# Patient Record
Sex: FEMALE | Race: WHITE | Employment: OTHER | URBAN - METROPOLITAN AREA
[De-identification: names, ages, dates, MRNs, and addresses within clinical notes are randomized per-mention and may not be internally consistent; named-entity substitution may affect disease eponyms.]

---

## 2018-02-20 ENCOUNTER — OP HISTORICAL/CONVERTED ENCOUNTER (OUTPATIENT)
Dept: OTHER | Age: 73
End: 2018-02-20

## 2018-09-19 ENCOUNTER — OP HISTORICAL/CONVERTED ENCOUNTER (OUTPATIENT)
Dept: OTHER | Age: 73
End: 2018-09-19

## 2020-09-22 LAB — AMB DEXA, EXTERNAL: NORMAL

## 2021-12-08 ENCOUNTER — HOSPITAL ENCOUNTER (OUTPATIENT)
Age: 76
Setting detail: OBSERVATION
Discharge: HOME | End: 2021-12-09
Attending: STUDENT IN AN ORGANIZED HEALTH CARE EDUCATION/TRAINING PROGRAM | Admitting: INTERNAL MEDICINE
Payer: MEDICARE

## 2021-12-08 ENCOUNTER — APPOINTMENT (OUTPATIENT)
Dept: CT IMAGING | Age: 76
End: 2021-12-08
Attending: STUDENT IN AN ORGANIZED HEALTH CARE EDUCATION/TRAINING PROGRAM
Payer: MEDICARE

## 2021-12-08 DIAGNOSIS — R47.81 SLURRED SPEECH: Primary | ICD-10-CM

## 2021-12-08 PROBLEM — G45.9 TIA (TRANSIENT ISCHEMIC ATTACK): Status: ACTIVE | Noted: 2021-12-08

## 2021-12-08 LAB
ABO + RH BLD: NORMAL
ALBUMIN SERPL-MCNC: 3.8 G/DL (ref 3.5–5)
ALBUMIN/GLOB SERPL: 1.2 {RATIO} (ref 1.1–2.2)
ALP SERPL-CCNC: 79 U/L (ref 45–117)
ALT SERPL-CCNC: 20 U/L (ref 12–78)
ANION GAP SERPL CALC-SCNC: 8 MMOL/L (ref 5–15)
APTT PPP: 23.6 SEC (ref 21.2–34.1)
AST SERPL W P-5'-P-CCNC: 12 U/L (ref 15–37)
BASOPHILS # BLD: 0 K/UL (ref 0–0.1)
BASOPHILS NFR BLD: 0 % (ref 0–1)
BILIRUB SERPL-MCNC: 0.2 MG/DL (ref 0.2–1)
BLOOD GROUP ANTIBODIES SERPL: NEGATIVE
BUN SERPL-MCNC: 28 MG/DL (ref 6–20)
BUN/CREAT SERPL: 26 (ref 12–20)
CA-I BLD-MCNC: 9.4 MG/DL (ref 8.5–10.1)
CHLORIDE SERPL-SCNC: 105 MMOL/L (ref 97–108)
CO2 SERPL-SCNC: 25 MMOL/L (ref 21–32)
CREAT SERPL-MCNC: 1.06 MG/DL (ref 0.55–1.02)
DIFFERENTIAL METHOD BLD: ABNORMAL
EOSINOPHIL # BLD: 0.2 K/UL (ref 0–0.4)
EOSINOPHIL NFR BLD: 2 % (ref 0–7)
ERYTHROCYTE [DISTWIDTH] IN BLOOD BY AUTOMATED COUNT: 13.1 % (ref 11.5–14.5)
GLOBULIN SER CALC-MCNC: 3.2 G/DL (ref 2–4)
GLUCOSE SERPL-MCNC: 317 MG/DL (ref 65–100)
HCT VFR BLD AUTO: 37.3 % (ref 35–47)
HGB BLD-MCNC: 12.4 G/DL (ref 11.5–16)
IMM GRANULOCYTES # BLD AUTO: 0 K/UL (ref 0–0.04)
IMM GRANULOCYTES NFR BLD AUTO: 1 % (ref 0–0.5)
INR PPP: 0.9 (ref 0.9–1.1)
LACTATE SERPL-SCNC: 1.6 MMOL/L (ref 0.4–2)
LYMPHOCYTES # BLD: 2.4 K/UL (ref 0.8–3.5)
LYMPHOCYTES NFR BLD: 30 % (ref 12–49)
MCH RBC QN AUTO: 33.2 PG (ref 26–34)
MCHC RBC AUTO-ENTMCNC: 33.2 G/DL (ref 30–36.5)
MCV RBC AUTO: 99.7 FL (ref 80–99)
MONOCYTES # BLD: 0.7 K/UL (ref 0–1)
MONOCYTES NFR BLD: 9 % (ref 5–13)
NEUTS SEG # BLD: 4.6 K/UL (ref 1.8–8)
NEUTS SEG NFR BLD: 58 % (ref 32–75)
NRBC # BLD: 0 K/UL (ref 0–0.01)
NRBC BLD-RTO: 0 PER 100 WBC
PLATELET # BLD AUTO: 370 K/UL (ref 150–400)
PMV BLD AUTO: 10.3 FL (ref 8.9–12.9)
POTASSIUM SERPL-SCNC: 4 MMOL/L (ref 3.5–5.1)
PROT SERPL-MCNC: 7 G/DL (ref 6.4–8.2)
PROTHROMBIN TIME: 12.3 SEC (ref 11.9–14.7)
RBC # BLD AUTO: 3.74 M/UL (ref 3.8–5.2)
SODIUM SERPL-SCNC: 138 MMOL/L (ref 136–145)
SPECIMEN EXP DATE BLD: NORMAL
THERAPEUTIC RANGE,PTTT: NORMAL SEC (ref 82–109)
TROPONIN-HIGH SENSITIVITY: 6 NG/L (ref 0–51)
WBC # BLD AUTO: 7.9 K/UL (ref 3.6–11)

## 2021-12-08 PROCEDURE — 96374 THER/PROPH/DIAG INJ IV PUSH: CPT

## 2021-12-08 PROCEDURE — 70450 CT HEAD/BRAIN W/O DYE: CPT

## 2021-12-08 PROCEDURE — 85610 PROTHROMBIN TIME: CPT

## 2021-12-08 PROCEDURE — 85025 COMPLETE CBC W/AUTO DIFF WBC: CPT

## 2021-12-08 PROCEDURE — 74011250636 HC RX REV CODE- 250/636

## 2021-12-08 PROCEDURE — 80053 COMPREHEN METABOLIC PANEL: CPT

## 2021-12-08 PROCEDURE — 74011000636 HC RX REV CODE- 636: Performed by: STUDENT IN AN ORGANIZED HEALTH CARE EDUCATION/TRAINING PROGRAM

## 2021-12-08 PROCEDURE — 99285 EMERGENCY DEPT VISIT HI MDM: CPT

## 2021-12-08 PROCEDURE — 84484 ASSAY OF TROPONIN QUANT: CPT

## 2021-12-08 PROCEDURE — 85730 THROMBOPLASTIN TIME PARTIAL: CPT

## 2021-12-08 PROCEDURE — 86901 BLOOD TYPING SEROLOGIC RH(D): CPT

## 2021-12-08 PROCEDURE — 70498 CT ANGIOGRAPHY NECK: CPT

## 2021-12-08 PROCEDURE — 36415 COLL VENOUS BLD VENIPUNCTURE: CPT

## 2021-12-08 PROCEDURE — G0378 HOSPITAL OBSERVATION PER HR: HCPCS

## 2021-12-08 PROCEDURE — 83605 ASSAY OF LACTIC ACID: CPT

## 2021-12-08 RX ORDER — ONDANSETRON 2 MG/ML
INJECTION INTRAMUSCULAR; INTRAVENOUS
Status: COMPLETED
Start: 2021-12-08 | End: 2021-12-08

## 2021-12-08 RX ORDER — ONDANSETRON 2 MG/ML
4 INJECTION INTRAMUSCULAR; INTRAVENOUS
Status: COMPLETED | OUTPATIENT
Start: 2021-12-08 | End: 2021-12-08

## 2021-12-08 RX ADMIN — IOPAMIDOL 100 ML: 755 INJECTION, SOLUTION INTRAVENOUS at 20:20

## 2021-12-08 RX ADMIN — ONDANSETRON 4 MG: 2 INJECTION INTRAMUSCULAR; INTRAVENOUS at 19:38

## 2021-12-09 ENCOUNTER — APPOINTMENT (OUTPATIENT)
Dept: NON INVASIVE DIAGNOSTICS | Age: 76
End: 2021-12-09
Attending: INTERNAL MEDICINE
Payer: MEDICARE

## 2021-12-09 ENCOUNTER — APPOINTMENT (OUTPATIENT)
Dept: MRI IMAGING | Age: 76
End: 2021-12-09
Attending: INTERNAL MEDICINE
Payer: MEDICARE

## 2021-12-09 VITALS
RESPIRATION RATE: 20 BRPM | SYSTOLIC BLOOD PRESSURE: 161 MMHG | TEMPERATURE: 97.8 F | OXYGEN SATURATION: 99 % | HEIGHT: 63 IN | HEART RATE: 91 BPM | BODY MASS INDEX: 30.12 KG/M2 | DIASTOLIC BLOOD PRESSURE: 67 MMHG | WEIGHT: 170 LBS

## 2021-12-09 LAB
ECHO AO ROOT DIAM: 3 CM
ECHO AV PEAK GRADIENT: 9 MMHG
ECHO AV PEAK VELOCITY: 152 CM/S
ECHO EST RA PRESSURE: 3 MMHG
ECHO LA AREA 4C: 11.01 CM2
ECHO LA MAJOR AXIS: 3.4 CM
ECHO LA MINOR AXIS: 1.89 CM
ECHO LV E' SEPTAL VELOCITY: 6.14 CM/S
ECHO LV EDV A2C: 96.1 CM3
ECHO LV EJECTION FRACTION BIPLANE: 78.8 % (ref 55–100)
ECHO LV ESV A2C: 14 CM3
ECHO LV INTERNAL DIMENSION DIASTOLIC: 4.58 CM (ref 3.9–5.3)
ECHO LV INTERNAL DIMENSION SYSTOLIC: 2.41 CM
ECHO LV IVSD: 0.98 CM (ref 0.6–0.9)
ECHO LV MASS 2D: 161 G (ref 67–162)
ECHO LV MASS INDEX 2D: 89.4 G/M2 (ref 43–95)
ECHO LV POSTERIOR WALL DIASTOLIC: 1.05 CM (ref 0.6–0.9)
ECHO LVOT PEAK GRADIENT: 4 MMHG
ECHO LVOT PEAK VELOCITY: 104 CM/S
ECHO MV A VELOCITY: 138 CM/S
ECHO MV AREA PHT: 4.23 CM2
ECHO MV E DECELERATION TIME (DT): 169 MS
ECHO MV E VELOCITY: 80.2 CM/S
ECHO MV E/A RATIO: 0.58
ECHO MV E/E' SEPTAL: 13.06
ECHO MV PRESSURE HALF TIME (PHT): 52 MS
ECHO PV MAX VELOCITY: 104 CM/S
ECHO PV PEAK INSTANTANEOUS GRADIENT SYSTOLIC: 4 MMHG
ECHO PVEIN A DURATION: 137 MS
ECHO PVEIN A VELOCITY: 33.6 CM/S
ECHO RA AREA 4C: 10.89 CM2
ECHO RIGHT VENTRICULAR SYSTOLIC PRESSURE (RVSP): 25 MMHG
ECHO RV INTERNAL DIMENSION: 2.8 CM
ECHO RV TAPSE: 1.9 CM (ref 1.5–2)
ECHO TV REGURGITANT PEAK GRADIENT: 22 MMHG
ECHO TV REGURGITANT VTI: 234 CM
GLUCOSE BLD STRIP.AUTO-MCNC: 247 MG/DL (ref 65–117)
GLUCOSE BLD STRIP.AUTO-MCNC: 333 MG/DL (ref 65–117)
GLUCOSE BLD STRIP.AUTO-MCNC: 344 MG/DL (ref 65–117)
GLUCOSE BLD STRIP.AUTO-MCNC: 353 MG/DL (ref 65–117)
MV DEC SLOPE: 5220 MM/S2
MV DEC SLOPE: 5220 MM/S2
PERFORMED BY, TECHID: ABNORMAL

## 2021-12-09 PROCEDURE — 74011250636 HC RX REV CODE- 250/636: Performed by: INTERNAL MEDICINE

## 2021-12-09 PROCEDURE — G0378 HOSPITAL OBSERVATION PER HR: HCPCS

## 2021-12-09 PROCEDURE — 97165 OT EVAL LOW COMPLEX 30 MIN: CPT

## 2021-12-09 PROCEDURE — 82962 GLUCOSE BLOOD TEST: CPT

## 2021-12-09 PROCEDURE — 74011250637 HC RX REV CODE- 250/637: Performed by: INTERNAL MEDICINE

## 2021-12-09 PROCEDURE — 92610 EVALUATE SWALLOWING FUNCTION: CPT

## 2021-12-09 PROCEDURE — 93306 TTE W/DOPPLER COMPLETE: CPT

## 2021-12-09 PROCEDURE — 96372 THER/PROPH/DIAG INJ SC/IM: CPT

## 2021-12-09 PROCEDURE — 74011636637 HC RX REV CODE- 636/637: Performed by: INTERNAL MEDICINE

## 2021-12-09 PROCEDURE — 70551 MRI BRAIN STEM W/O DYE: CPT

## 2021-12-09 RX ORDER — ENOXAPARIN SODIUM 100 MG/ML
40 INJECTION SUBCUTANEOUS EVERY 24 HOURS
Status: DISCONTINUED | OUTPATIENT
Start: 2021-12-09 | End: 2021-12-09 | Stop reason: HOSPADM

## 2021-12-09 RX ORDER — ATENOLOL AND CHLORTHALIDONE TABLET 50; 25 MG/1; MG/1
1 TABLET ORAL DAILY
COMMUNITY

## 2021-12-09 RX ORDER — CLOPIDOGREL BISULFATE 75 MG/1
75 TABLET ORAL DAILY
Status: DISCONTINUED | OUTPATIENT
Start: 2021-12-09 | End: 2021-12-09

## 2021-12-09 RX ORDER — INSULIN GLARGINE 100 [IU]/ML
20 INJECTION, SOLUTION SUBCUTANEOUS
COMMUNITY
End: 2022-04-28 | Stop reason: SDUPTHER

## 2021-12-09 RX ORDER — SODIUM CHLORIDE 9 MG/ML
75 INJECTION, SOLUTION INTRAVENOUS CONTINUOUS
Status: DISCONTINUED | OUTPATIENT
Start: 2021-12-09 | End: 2021-12-09 | Stop reason: HOSPADM

## 2021-12-09 RX ORDER — MAGNESIUM SULFATE 100 %
4 CRYSTALS MISCELLANEOUS AS NEEDED
Status: DISCONTINUED | OUTPATIENT
Start: 2021-12-09 | End: 2021-12-09 | Stop reason: HOSPADM

## 2021-12-09 RX ORDER — INSULIN LISPRO 100 [IU]/ML
7 INJECTION, SOLUTION INTRAVENOUS; SUBCUTANEOUS ONCE
Status: COMPLETED | OUTPATIENT
Start: 2021-12-09 | End: 2021-12-09

## 2021-12-09 RX ORDER — INSULIN GLARGINE 100 [IU]/ML
10 INJECTION, SOLUTION SUBCUTANEOUS
Status: DISCONTINUED | OUTPATIENT
Start: 2021-12-09 | End: 2021-12-09 | Stop reason: HOSPADM

## 2021-12-09 RX ORDER — SIMVASTATIN 20 MG/1
TABLET, FILM COATED ORAL
COMMUNITY
End: 2021-12-09

## 2021-12-09 RX ORDER — GUAIFENESIN 100 MG/5ML
81 LIQUID (ML) ORAL DAILY
Status: DISCONTINUED | OUTPATIENT
Start: 2021-12-10 | End: 2021-12-09 | Stop reason: HOSPADM

## 2021-12-09 RX ORDER — GUAIFENESIN 100 MG/5ML
81 LIQUID (ML) ORAL DAILY
Qty: 30 TABLET | Refills: 0 | Status: SHIPPED
Start: 2021-12-10

## 2021-12-09 RX ORDER — ACETAMINOPHEN 650 MG/1
650 SUPPOSITORY RECTAL
Status: DISCONTINUED | OUTPATIENT
Start: 2021-12-09 | End: 2021-12-09 | Stop reason: HOSPADM

## 2021-12-09 RX ORDER — DEXTROSE 50 % IN WATER (D50W) INTRAVENOUS SYRINGE
25-50 AS NEEDED
Status: DISCONTINUED | OUTPATIENT
Start: 2021-12-09 | End: 2021-12-09 | Stop reason: HOSPADM

## 2021-12-09 RX ORDER — SITAGLIPTIN AND METFORMIN HYDROCHLORIDE 50; 1000 MG/1; MG/1
1 TABLET, FILM COATED ORAL
COMMUNITY
End: 2022-04-28 | Stop reason: SDUPTHER

## 2021-12-09 RX ORDER — AMLODIPINE BESYLATE AND BENAZEPRIL HYDROCHLORIDE 10; 40 MG/1; MG/1
1 CAPSULE ORAL DAILY
COMMUNITY

## 2021-12-09 RX ORDER — ACETAMINOPHEN 325 MG/1
650 TABLET ORAL
Status: DISCONTINUED | OUTPATIENT
Start: 2021-12-09 | End: 2021-12-09 | Stop reason: HOSPADM

## 2021-12-09 RX ORDER — INSULIN LISPRO 100 [IU]/ML
INJECTION, SOLUTION INTRAVENOUS; SUBCUTANEOUS
Status: DISCONTINUED | OUTPATIENT
Start: 2021-12-09 | End: 2021-12-09 | Stop reason: HOSPADM

## 2021-12-09 RX ORDER — ATORVASTATIN CALCIUM 40 MG/1
40 TABLET, FILM COATED ORAL DAILY
Status: DISCONTINUED | OUTPATIENT
Start: 2021-12-10 | End: 2021-12-09 | Stop reason: HOSPADM

## 2021-12-09 RX ORDER — ATORVASTATIN CALCIUM 40 MG/1
40 TABLET, FILM COATED ORAL DAILY
Qty: 30 TABLET | Refills: 0 | Status: SHIPPED | OUTPATIENT
Start: 2021-12-10 | End: 2022-07-28 | Stop reason: SDUPTHER

## 2021-12-09 RX ORDER — ASPIRIN 325 MG
325 TABLET ORAL DAILY
Status: DISCONTINUED | OUTPATIENT
Start: 2021-12-09 | End: 2021-12-09

## 2021-12-09 RX ORDER — ERGOCALCIFEROL 1.25 MG/1
50000 CAPSULE ORAL
COMMUNITY

## 2021-12-09 RX ADMIN — SODIUM CHLORIDE 75 ML/HR: 9 INJECTION, SOLUTION INTRAVENOUS at 03:52

## 2021-12-09 RX ADMIN — ENOXAPARIN SODIUM 40 MG: 100 INJECTION SUBCUTANEOUS at 01:39

## 2021-12-09 RX ADMIN — INSULIN GLARGINE 10 UNITS: 100 INJECTION, SOLUTION SUBCUTANEOUS at 03:51

## 2021-12-09 RX ADMIN — CLOPIDOGREL BISULFATE 75 MG: 75 TABLET ORAL at 10:55

## 2021-12-09 RX ADMIN — INSULIN LISPRO 3 UNITS: 100 INJECTION, SOLUTION INTRAVENOUS; SUBCUTANEOUS at 10:55

## 2021-12-09 RX ADMIN — INSULIN LISPRO 7 UNITS: 100 INJECTION, SOLUTION INTRAVENOUS; SUBCUTANEOUS at 03:51

## 2021-12-09 RX ADMIN — ASPIRIN 325 MG ORAL TABLET 325 MG: 325 PILL ORAL at 10:55

## 2021-12-09 RX ADMIN — INSULIN LISPRO 7 UNITS: 100 INJECTION, SOLUTION INTRAVENOUS; SUBCUTANEOUS at 11:30

## 2021-12-09 NOTE — PROGRESS NOTES
Reason for Admission:  TIA                     RUR Score: N/A                    Plan for utilizing home health:  None        PCP: First and Last name:  Jaylan Herbert MD     Name of Practice:    Are you a current patient: Yes/No: Yes   Approximate date of last visit: two weeks   Can you participate in a virtual visit with your PCP: Yes                    Current Advanced Directive/Advance Care Plan: Full Code      Healthcare Decision Maker:   Click here to complete [address] including selection of the Healthcare Decision Maker Relationship (ie \"Primary\")           Self                  Transition of Care Plan:                    CM discussed discharge planning with patient. She will return home at discharge. Family will transport her. 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 Her sisters also assist as needed. Patient lives in a single story home with three steps to enter. Patient complete own ADL/IADL care. She is able to self medicate and drive to appt. Observation notice provided in writing to patient as well as verbal explanation of the policy. Copy placed on chart.

## 2021-12-09 NOTE — CONSULTS
NEUROLOGY CONSULT    Name Cassandra Calvillo Age 78 y.o. MRN [id number]  1945     Referring Physician: Valdemar Dinh MD      Chief Complaint: TIA versus stroke     This is a 78 y.o. right handed  female who was going to the Religious with her mother yesterday afternoon when suddenly her left hand went out of control and the only way she could control it was by holding with the other hand. She noticed slight poor control in the left foot also in change in the speech. She started throwing up. She denied any change in the vision or any focal weakness or numbness or difficulty walking. She waited for some time to see if it will go away but it continues to she decided to come back home. She called her sister and they decided to come to the ER. The symptoms lasted for about 2 to 3 hours and then resolved. The vomiting is stopped. She has been feeling fine since. A CTA of the head and the neck done in the ED was essentially unremarkable for any significant flow-limiting stenotic disease in the head and the neck. An MRI done this morning revealed a lacunar infarct in the left cerebellar hemisphere. Assessment and Plan:  1. Left cerebellar acute lacunar infarct: Patient symptoms have resolved and she feels back to her normal self except the speech is still slightly slow and scanning. The patient needs echocardiogram and if normal she can be discharged on aspirin 81 mg once a day and follow-up in the clinic if needed. Since the CTA of the neck was unremarkable, I am discontinuing the carotid Doppler. 2.  Hypertension. 3.  Diabetes mellitus. 4.  Exogenous obesity.     Thank you for the consult    No Known Allergies     Current Facility-Administered Medications   Medication Dose Route Frequency    acetaminophen (TYLENOL) tablet 650 mg  650 mg Oral Q4H PRN    Or    acetaminophen (TYLENOL) solution 650 mg  650 mg Per NG tube Q4H PRN    Or    acetaminophen (TYLENOL) suppository 650 mg 650 mg Rectal Q4H PRN    0.9% sodium chloride infusion  75 mL/hr IntraVENous CONTINUOUS    enoxaparin (LOVENOX) injection 40 mg  40 mg SubCUTAneous Q24H    insulin lispro (HUMALOG) injection   SubCUTAneous AC&HS    glucose chewable tablet 16 g  4 Tablet Oral PRN    dextrose (D50W) injection syrg 12.5-25 g  25-50 mL IntraVENous PRN    glucagon (GLUCAGEN) injection 1 mg  1 mg IntraMUSCular PRN    insulin glargine (LANTUS) injection 10 Units  10 Units SubCUTAneous QHS    [START ON 12/10/2021] atorvastatin (LIPITOR) tablet 40 mg  40 mg Oral DAILY    [START ON 12/10/2021] aspirin chewable tablet 81 mg  81 mg Oral DAILY     History reviewed. No pertinent past medical history. Social History     Tobacco Use    Smoking status: Never Smoker    Smokeless tobacco: Never Used   Substance Use Topics    Alcohol use: Not on file    Drug use: Never     Exam  Visit Vitals  /64 (BP 1 Location: Left upper arm, BP Patient Position: At rest)   Pulse 81   Temp 97.6 °F (36.4 °C)   Resp 18   Ht 5' 3\" (1.6 m)   Wt 77.1 kg (170 lb)   SpO2 96%   BMI 30.11 kg/m²     General: Well developed, well nourished. Patient in no distress   Head: Normocephalic, atraumatic, anicteric sclera   Neck Normal ROM, No thyromegally   Lungs:  Clear to auscultation    Cardiac: Regular rate and rhythm with no murmurs. Abd: Bowel sounds were audible   Ext: No pedal edema   Skin: Supple no rash     NeurologicExam:  Mental Status: Alert and oriented to person place and time   Speech: Mild scanning, no aphasia slight dysarthria. Cranial Nerves:   Pupils are equal round and reactive to light and accommodation, extraocular movements are intact and full, visual fields are intact by confrontation, no nystagmus noted, face is symmetric, sensation in face is intact and symmetric, hearing is intact and symmetric, tongue and uvula are in midline with normal movements, palate is elevating equally, shoulder shrug is intact and symmetric.    Motor: Full and symmetric strength of upper and lower ext . Normal bulk and tone. Reflexes:   Deep tendon reflexes 2+/4 in the upper extremities and 0/4 in the lower and symmetric. Sensory:   Symmetric and intact    Gait:  Gait is deferred   Tremor:   No tremor noted. Cerebellar:  Coordination intact for finger-nose-finger. Neurovascular: No carotid bruits.  No JVD      Lab Review  Lab Results   Component Value Date/Time    WBC 7.9 12/08/2021 07:45 PM    HCT 37.3 12/08/2021 07:45 PM    HGB 12.4 12/08/2021 07:45 PM    PLATELET 370 44/87/9015 07:45 PM     Lab Results   Component Value Date/Time    Sodium 138 12/08/2021 07:45 PM    Potassium 4.0 12/08/2021 07:45 PM    Chloride 105 12/08/2021 07:45 PM    CO2 25 12/08/2021 07:45 PM    Glucose 317 (H) 12/08/2021 07:45 PM    BUN 28 (H) 12/08/2021 07:45 PM    Creatinine 1.06 (H) 12/08/2021 07:45 PM    Calcium 9.4 12/08/2021 07:45 PM     No results found for: B12LT, FOL, RBCF  No results found for: LDL, LDLC, DLDLP  No results found for: HBA1C, FLP2HOCW, [id number]  No components found for: TROPQUANT  No results found for: DONNA

## 2021-12-09 NOTE — PROGRESS NOTES
Discussed discharge instructions with the patient including medications and follow up appointments. Patient verbalized understanding. IV removed. Primary nurse made aware of patient's blood pressure. Patient not taken down for discharge at this time.

## 2021-12-09 NOTE — PROGRESS NOTES
SPEECH LANGUAGE PATHOLOGY BEDSIDE SWALLOW EVALUATIONS  Patient: Teri Moses  (78 y.o. )  Date: 12/9/2021  Primary Diagnosis: CVA   Precautions:      ASSESSMENT :  Patient is alert, oriented x4, and follows basic commands. Slight R facial droop w/ minimal dysarthria. Speech is 100% intelligible w/ reduced rate and imprecise articulation. Patient reports tongue feels \"heavy\". No language deficits noted informally. Patient present w/ wfl oropharyngeal swallow fxn. Oral phase c/b adequate mastication and A-P transit. Pharyngeal phase c/b timely swallow initiation and HLE is wfl upon palpation. No overt s/s of pen/asp observed. PLAN :  Recommendations and Planned Interventions:  Rec regular/thin diet w/ general asp/GERD precautions. No ST needs for dysphagia. Follow-up for dysarthria eval.      SUBJECTIVE:   Patient denies dysphagia and GERD. Reports sx's of facial droop, dysarthria and involuntary L sided movement. She states involuntary movement resolved but speech remains slurred. Patient reports difficulty managing blood sugars. OBJECTIVE:     Patient admitted w/ facial droop, dysarthria, L sided weakness and involuntary movement. PMH for CVA approx 15 years ago, HTN and DMII. CT CODE NEURO HEAD WO CONTRAST   Final Result   1. No acute intracranial abnormality. 2.  Chronic bilateral basal ganglia infarcts. Report called to Dr. Medardo Diamond by Dr. Dennie Members at 8:33 PM on 12/8/2021.       CTA CODE NEURO HEAD AND NECK W CONT    (Results Pending)   MRI BRAIN WO CONT    (Results Pending)         Diet prior to admission: Regular  Current Diet:  DIET ADULT     Cognitive and Communication Status:  Neurologic State: Alert  Orientation Level: Oriented X4  Cognition: Appropriate decision making, Follows commands  Perception: Appears intact  Perseveration: No perseveration noted  Safety/Judgement: Awareness of environment  Swallowing Evaluation:   Oral Assessment:  Oral Assessment  Labial: Right droop  Dentition: Upper dentures; Other (comment) (lower implants)  Oral Hygiene: wfl  Lingual: No impairment  Velum: No impairment  Mandible: No impairment  P.O. Trials:  Patient Position: upright in bed  Vocal quality prior to P.O.: No impairment  Consistency Presented: Puree; Solid; Thin liquid  How Presented: Self-fed/presented; SLP-fed/presented; Cup/sip; Straw; Successive swallows     Bolus Acceptance: No impairment  Bolus Formation/Control: No impairment     Propulsion: No impairment  Oral Residue: None  Initiation of Swallow: No impairment  Laryngeal Elevation: Functional  Aspiration Signs/Symptoms: None  Pharyngeal Phase Characteristics: No impairment, issues, or problems              Oral Phase Severity: No impairment  Pharyngeal Phase Severity : No impairment  Voice:  Vocal Quality: No impairment    Pain:  Pain Scale 1: Numeric (0 - 10)  Pain Intensity 1: 0         After treatment:   Patient left in no apparent distress in bed, Call bell within reach, Nursing notified and Caregiver / family present    COMMUNICATION/EDUCATION:   Patient was educated regarding diet recs, swallow strategies, speech strategies, BEFAST, risks of unmanaged blood sugar for stroke, and aspiration precautions. She demonstrated Good understanding as evidenced by engagement and recall. The patient's plan of care including recommendations, planned interventions, and recommended diet changes were discussed with: Occupational therapist and Registered nurse.        Thank you for this referral.  Shara Bear M.S., M.Ed., CCC-SLP  Time Calculation: 21 mins

## 2021-12-09 NOTE — ED TRIAGE NOTES
Sister called ems stating pt having slurred speech while talking to her, and acting strange. Left arm, leg and head involentarily movement. Equal  strong. Accu 319mg/dl. Vomitted once. Just started taking insulin. Hx of veritgo.  Last known well around 6:15-6:30pm.

## 2021-12-09 NOTE — DISCHARGE SUMMARY
Physician Discharge Summary     Patient ID:    Norma Roa  [id number]  86 y.o.  1945    Admit date: 12/8/2021    Discharge date : 12/9/2021    Chronic Diagnoses:    Problem List as of 12/9/2021 Never Reviewed          Codes Class Noted - Resolved    TIA (transient ischemic attack) ICD-10-CM: G45.9  ICD-9-CM: 435.9  12/8/2021 - Present        Slurred speech ICD-10-CM: R47.81  ICD-9-CM: 784.59  12/8/2021 - Present          22    Final Diagnoses:   Acute left cerebellar infarct     History of bilateral basal ganglia infarcts     Hypertension     Diabetes mellitus type 2    Reason for Hospitalization:  She is a 70-year-old female with history of CVA without deficit, hypertension and diabetes who presented the ED last night with slurred speech and transient left upper extremity involuntary movement and weakness which started around 6:30 PM.  No symptoms resolved in the ED. Hospital Course:   Patient was admitted overnight as observation. She was started on aspirin and high intensity statin therapy. Her symptoms all largely resolved with exception of some slow speech    MRI of the brain was obtained which showed an acute ischemic stroke left cerebellar region    CTA of the head and neck was unremarkable for large vessel occlusion    Patient was evaluated by neurology    We are currently waiting for her final echo report              Discharge Medications:   Current Discharge Medication List      START taking these medications    Details   atorvastatin (LIPITOR) 40 mg tablet Take 1 Tablet by mouth daily. Qty: 30 Tablet, Refills: 0  Start date: 12/10/2021      aspirin 81 mg chewable tablet Take 1 Tablet by mouth daily. Qty: 30 Tablet, Refills: 0  Start date: 12/10/2021         CONTINUE these medications which have NOT CHANGED    Details   amLODIPine-benazepril (LOTREL) 10-40 mg per capsule Take 1 Capsule by mouth daily.       atenoloL-chlorthalidone (TENORETIC) 50-25 mg per tablet Take 1 Tablet by mouth daily. ergocalciferol (Vitamin D2) 1,250 mcg (50,000 unit) capsule Take 50,000 Units by mouth every seven (7) days. SITagliptin-metFORMIN (Janumet) 50-1,000 mg per tablet Take 1 Tablet by mouth daily (with breakfast). insulin glargine (Lantus Solostar U-100 Insulin) 100 unit/mL (3 mL) inpn 20 Units by SubCUTAneous route nightly. STOP taking these medications       simvastatin (ZOCOR) 20 mg tablet Comments:   Reason for Stopping: Follow up Care:    1. Butch Cobos MD in 1-2 weeks. Please call to set up an appointment shortly after discharge. Diet:  Diabetic Diet    Disposition:  Home. Advanced Directive:   FULL    DNR      Discharge Exam:  General:  Alert, cooperative, no distress, appears stated age. Lungs:   Clear to auscultation bilaterally. Chest wall:  No tenderness or deformity. Heart:  Regular rate and rhythm, S1, S2 normal, no murmur, click, rub or gallop. Abdomen:   Soft, non-tender. Bowel sounds normal. No masses,  No organomegaly. Extremities: Extremities normal, atraumatic, no cyanosis or edema. Pulses: 2+ and symmetric all extremities. Skin: Skin color, texture, turgor normal. No rashes or lesions   Neurologic: CNII-XII intact. No gross sensory or motor deficits        CONSULTATIONS: Neurology    Significant Diagnostic Studies:   12/8/2021: BUN 28 mg/dL (H; Ref range: 6 - 20 mg/dL); Calcium 9.4 mg/dL (Ref range: 8.5 - 10.1 mg/dL); CO2 25 mmol/L (Ref range: 21 - 32 mmol/L); Creatinine 1.06 mg/dL (H; Ref range: 0.55 - 1.02 mg/dL); Glucose 317 mg/dL (H; Ref range: 65 - 100 mg/dL); HCT 37.3 % (Ref range: 35.0 - 47.0 %); HGB 12.4 g/dL (Ref range: 11.5 - 16.0 g/dL); Potassium 4.0 mmol/L (Ref range: 3.5 - 5.1 mmol/L);  Sodium 138 mmol/L (Ref range: 136 - 145 mmol/L)  Recent Labs     12/08/21 1945   WBC 7.9   HGB 12.4   HCT 37.3        Recent Labs     12/08/21 1945      K 4.0      CO2 25   BUN 28* CREA 1.06*   *   CA 9.4     Recent Labs     12/08/21 1945   ALT 20   AP 79   TBILI 0.2   TP 7.0   ALB 3.8   GLOB 3.2     Recent Labs     12/08/21 1945   INR 0.9   PTP 12.3   APTT 23.6      No results for input(s): FE, TIBC, PSAT, FERR in the last 72 hours. No results for input(s): PH, PCO2, PO2 in the last 72 hours. No results for input(s): CPK, CKMB in the last 72 hours.     No lab exists for component: TROPONINI  Lab Results   Component Value Date/Time    Glucose (POC) 333 (H) 12/09/2021 11:27 AM    Glucose (POC) 247 (H) 12/09/2021 08:12 AM    Glucose (POC) 344 (H) 12/09/2021 03:46 AM    Glucose (POC) 353 (H) 12/09/2021 02:40 AM       Discharge time spent 35 minutes    Signed:  Watson Jamil MD  12/9/2021  3:24 PM

## 2021-12-09 NOTE — ROUTINE PROCESS
[facility] TRANSFER - OUT REPORT:    Verbal report given to ilya(name) on [place]  being transferred to (unit) for routine progression of care       Report consisted of patients Situation, Background, Assessment and   Recommendations(SBAR). Information from the following report(s) SBAR, ED Summary and MAR was reviewed with the receiving nurse. Lines:   Peripheral IV 12/08/21 Anterior; Left; Proximal Forearm (Active)        Opportunity for questions and clarification was provided.       Patient transported with:   [place]

## 2021-12-09 NOTE — ROUTINE PROCESS
Admission skin assessment done with Tg Patel the patient has redness sacral area ,redness left breast fold.

## 2021-12-09 NOTE — PROGRESS NOTES
PT eval order received and acknowledged. Pt screened and is currently presenting at baseline for functional mobility/transfers. Pt observed amb in room without AD, slightly decreased stance time on left which pt states is her baseline since suffering ankle fracture ~10 years ago. Denies change in sensation, balance, coordination, and strength. PT evaluation order will be discontinued at this time as pt has no acute PT needs. Please reorder PT if pt functional status changes. Thank you.

## 2021-12-09 NOTE — ED PROVIDER NOTES
EMERGENCY DEPARTMENT HISTORY AND PHYSICAL EXAM      Date: 12/8/2021  Patient Name: Jorgito Simmons      History of Presenting Illness     Chief Complaint   Patient presents with    Extremity Weakness    Dizziness       History Provided By: Patient    HPI: Jorgito Simmons, 78 y.o. female with MH of CVA presents to the ED with slurred speech that was noted to be approximately started at [phone]. This was noted by her sister. Also patient has some complaints that her left arm is moving involuntarily. Symptoms of moderate severity, no aggravating relieving factors no associate additional symptoms. EMS did a fingerstick which was 337. There are no other complaints, changes, or physical findings at this time. PCP: Jess Carbajal MD    Current Facility-Administered Medications   Medication Dose Route Frequency Provider Last Rate Last Admin    ondansetron (ZOFRAN) 4 mg/2 mL injection                Past History     Past Medical History:  Diabetes mellitus, hypertension, hyperlipidemia, CVA    Past Surgical History:  History reviewed. No pertinent surgical history. Family History:  History reviewed. No pertinent family history. Social History:  Social History     Tobacco Use    Smoking status: Never Smoker    Smokeless tobacco: Never Used   Substance Use Topics    Alcohol use: Not on file    Drug use: Never       Allergies:  No Known Allergies      Review of Systems     Review of Systems   Constitutional: Negative for activity change and fever. HENT: Negative for dental problem, ear pain, sinus pressure and sinus pain. Eyes: Negative for photophobia, pain and visual disturbance. Respiratory: Negative for cough and shortness of breath. Cardiovascular: Negative for chest pain and leg swelling. Gastrointestinal: Negative for nausea and vomiting. Endocrine: Negative for polydipsia and polyuria.    Musculoskeletal: Negative for arthralgias, gait problem, neck pain and neck stiffness. Neurological: Positive for speech difficulty. Negative for dizziness, seizures and weakness. Psychiatric/Behavioral: Negative for confusion and hallucinations. Physical Exam     Physical Exam  Vitals and nursing note reviewed. Constitutional:       General: She is not in acute distress. Appearance: Normal appearance. She is not ill-appearing, toxic-appearing or diaphoretic. HENT:      Head: Normocephalic and atraumatic. Mouth/Throat:      Mouth: Mucous membranes are moist.      Pharynx: Oropharynx is clear. Eyes:      Extraocular Movements: Extraocular movements intact. Conjunctiva/sclera: Conjunctivae normal.   Cardiovascular:      Rate and Rhythm: Normal rate and regular rhythm. Pulmonary:      Effort: Pulmonary effort is normal.      Breath sounds: Normal breath sounds. Abdominal:      Palpations: Abdomen is soft. Tenderness: There is no abdominal tenderness. Skin:     General: Skin is warm and dry. Neurological:      Mental Status: She is alert and oriented to person, place, and time. Cranial Nerves: No cranial nerve deficit. Sensory: No sensory deficit. Motor: No weakness. Lab and Diagnostic Study Results     Labs -     Recent Results (from the past 12 hour(s))   CBC WITH AUTOMATED DIFF    Collection Time: 12/08/21  7:45 PM   Result Value Ref Range    WBC 7.9 3.6 - 11.0 K/uL    RBC 3.74 (L) 3.80 - 5.20 M/uL    HGB 12.4 11.5 - 16.0 g/dL    HCT 37.3 35.0 - 47.0 %    MCV 99.7 (H) 80.0 - 99.0 FL    MCH 33.2 26.0 - 34.0 PG    MCHC 33.2 30.0 - 36.5 g/dL    RDW 13.1 11.5 - 14.5 %    PLATELET 675 683 - 308 K/uL    MPV 10.3 8.9 - 12.9 FL    NRBC 0.0 0.0  WBC    ABSOLUTE NRBC 0.00 0.00 - 0.01 K/uL    NEUTROPHILS 58 32 - 75 %    LYMPHOCYTES 30 12 - 49 %    MONOCYTES 9 5 - 13 %    EOSINOPHILS 2 0 - 7 %    BASOPHILS 0 0 - 1 %    IMMATURE GRANULOCYTES 1 (H) 0 - 0.5 %    ABS. NEUTROPHILS 4.6 1.8 - 8.0 K/UL    ABS.  LYMPHOCYTES 2.4 0.8 - 3.5 K/UL    ABS. MONOCYTES 0.7 0.0 - 1.0 K/UL    ABS. EOSINOPHILS 0.2 0.0 - 0.4 K/UL    ABS. BASOPHILS 0.0 0.0 - 0.1 K/UL    ABS. IMM. GRANS. 0.0 0.00 - 0.04 K/UL    DF AUTOMATED     METABOLIC PANEL, COMPREHENSIVE    Collection Time: 12/08/21  7:45 PM   Result Value Ref Range    Sodium 138 136 - 145 mmol/L    Potassium 4.0 3.5 - 5.1 mmol/L    Chloride 105 97 - 108 mmol/L    CO2 25 21 - 32 mmol/L    Anion gap 8 5 - 15 mmol/L    Glucose 317 (H) 65 - 100 mg/dL    BUN 28 (H) 6 - 20 mg/dL    Creatinine 1.06 (H) 0.55 - 1.02 mg/dL    BUN/Creatinine ratio 26 (H) 12 - 20      GFR est AA >60 >60 ml/min/1.73m2    GFR est non-AA 51 (L) >60 ml/min/1.73m2    Calcium 9.4 8.5 - 10.1 mg/dL    Bilirubin, total 0.2 0.2 - 1.0 mg/dL    AST (SGOT) 12 (L) 15 - 37 U/L    ALT (SGPT) 20 12 - 78 U/L    Alk. phosphatase 79 45 - 117 U/L    Protein, total 7.0 6.4 - 8.2 g/dL    Albumin 3.8 3.5 - 5.0 g/dL    Globulin 3.2 2.0 - 4.0 g/dL    A-G Ratio 1.2 1.1 - 2.2     PROTHROMBIN TIME + INR    Collection Time: 12/08/21  7:45 PM   Result Value Ref Range    Prothrombin time 12.3 11.9 - 14.7 sec    INR 0.9 0.9 - 1.1     PTT    Collection Time: 12/08/21  7:45 PM   Result Value Ref Range    aPTT 23.6 21.2 - 34.1 sec    aPTT, therapeutic range   82 - 109 sec   TROPONIN-HIGH SENSITIVITY    Collection Time: 12/08/21  7:45 PM   Result Value Ref Range    Troponin-High Sensitivity 6 0 - 51 ng/L   LACTIC ACID    Collection Time: 12/08/21  7:47 PM   Result Value Ref Range    Lactic acid 1.6 0.4 - 2.0 mmol/L       Radiologic Studies -   [unfilled]  CT Results  (Last 48 hours)               12/08/21 1950  CT CODE NEURO HEAD WO CONTRAST Final result    Impression:  1. No acute intracranial abnormality. 2.  Chronic bilateral basal ganglia infarcts. Report called to Dr. Stephanie Busch by Dr. Rema Hinojosa at 8:33 PM on 12/8/2021. Narrative:  Study: Head CT without contrast.       Clinical Indication: Left-sided weakness. Comparison: None available. Technique: Routine volume acquisition of the head was performed without contrast   using soft tissue and bone kernels. Coronal and sagittal reconstructions were   generated and reviewed. Dose reduction: All CT scans at this facility are   performed using dose reduction optimization techniques as appropriate to a   performed exam including the following-automated exposure control, adjustments   of mA and/or Kv according to patient size, or use of iterative reconstructive   technique. Findings:       Chronic infarcts of the bilateral basal ganglia with commensurate prominence of   the left lateral ventricle. No midline shift. The basal cisterns are patent. No acute hemorrhage, abnormal   extra-axial fluid, or evidence of acute large territorial ischemia. Intracranial   vascular calcifications. Unremarkable orbits. The included paranasal sinuses and mastoid air cells are   clear. Fluid in the right petrous apex. No evidence of an aggressive calvarial   or extracalvarial lesion. CXR Results  (Last 48 hours)    None          Medical Decision Making and ED Course   - I am the first and primary provider for this patient AND AM THE PRIMARY PROVIDER OF RECORD. - I reviewed the vital signs, available nursing notes, past medical history, past surgical history, family history and social history. - Initial assessment performed. The patients presenting problems have been discussed, and the staff are in agreement with the care plan formulated and outlined with them. I have encouraged them to ask questions as they arise throughout their visit. Vital Signs-Reviewed the patient's vital signs. Patient Vitals for the past 24 hrs:   Temp Pulse Resp BP SpO2   12/08/21 2021 98 °F (36.7 °C) -- -- -- --   12/08/21 2019 -- 93 16 (!) 149/58 98 %       Records Reviewed: Nursing Notes    Provider Notes (Medical Decision Making):     Patient presented to the ED with concerns for stroke.   Noted by family that the patient has slurred speech and concern for some involuntary movement of the left arm. Stroke was activated. Imaging were reviewed without acute finding. CTA without LVO. Neurology was consulted and they recommended admission to telemetry and getting MRI in the morning. Disposition     Disposition: Condition stable  Admitted to Observation Unit the case was discussed with the admitting physician Dr. Kashmir Gan    Admitted      Diagnosis     Clinical Impression:   1. Slurred speech        Attestations: Antionette aLi MD    Please note that this dictation was completed with [employer], the computer voice recognition software. Quite often unanticipated grammatical, syntax, homophones, and other interpretive errors are inadvertently transcribed by the computer software. Please disregard these errors. Please excuse any errors that have escaped final proofreading. Thank you.

## 2021-12-09 NOTE — H&P
GENERAL GENERIC H&P/CONSULT    Subjective:  79-year-old female with remote history of CVA without residual deficit, hypertension, DM type II. Patient presents to the emergency department complaining of slurred speech and right upper extremity involuntary movement, numbness and weakness since around 6:30 PM.  Sisters at the bedside, she reports left facial droop. Denies headache or visual changes. No loss of consciousness or seizure. Denies shortness of breath, cough or chest pain. Noncontrast CT head shows remote stroke without acute intracranial process. CTA is ordered and pending. Patient is referred for admission for further stroke work-up. History reviewed. No pertinent past medical history. History reviewed. No pertinent surgical history. Prior to Admission medications    Not on File   Home medication includes amlodipine, insulin and oral hypoglycemic agent for diabetes. Patient does not have an exact list with her. No Known Allergies   Social History     Tobacco Use    Smoking status: Never Smoker    Smokeless tobacco: Never Used   Substance Use Topics    Alcohol use: Not on file      History reviewed. No pertinent family history. Review of Systems   Constitutional: Negative for appetite change, chills, diaphoresis and fever. HENT: Negative. Eyes: Negative for pain and visual disturbance. Respiratory: Negative. Cardiovascular: Negative. Gastrointestinal: Negative. Endocrine: Negative. Genitourinary: Negative. Musculoskeletal: Negative. Skin: Negative. Neurological: Positive for facial asymmetry, speech difficulty and weakness. Negative for syncope. Psychiatric/Behavioral: Negative. Objective:    No intake/output data recorded. No intake/output data recorded.   Patient Vitals for the past 8 hrs:   BP Temp Pulse Resp SpO2 Height Weight   12/08/21 2311 (!) 142/72 -- 89 15 97 % -- --   12/08/21 2021 -- 98 °F (36.7 °C) -- -- -- -- --   12/08/21 2019 (!) 149/58 -- 93 16 98 % -- --   12/08/21 1940 -- -- -- -- -- 5' 3\" (1.6 m) 77.1 kg (170 lb)     Physical Exam  Constitutional:       General: She is not in acute distress. Appearance: Normal appearance. HENT:      Head: Normocephalic and atraumatic. Mouth/Throat:      Mouth: Mucous membranes are moist.      Pharynx: Oropharynx is clear. Eyes:      Extraocular Movements: Extraocular movements intact. Conjunctiva/sclera: Conjunctivae normal.      Pupils: Pupils are equal, round, and reactive to light. Cardiovascular:      Rate and Rhythm: Normal rate. Pulses: Normal pulses. Heart sounds: Normal heart sounds. No murmur heard. No friction rub. No gallop. Pulmonary:      Effort: Pulmonary effort is normal.      Breath sounds: Normal breath sounds. Abdominal:      General: Bowel sounds are normal.      Palpations: Abdomen is soft. Musculoskeletal:         General: Normal range of motion. Cervical back: Normal range of motion and neck supple. Skin:     General: Skin is warm and dry. Neurological:      Mental Status: She is alert and oriented to person, place, and time. Mental status is at baseline. Motor: No weakness. Comments: Mild facial deviation to the left  Slurred speech  Power is 5 out of 5 in all extremities   No sensory deficit   Psychiatric:         Mood and Affect: Mood normal.         Thought Content:  Thought content normal.          Labs:    Recent Results (from the past 24 hour(s))   CBC WITH AUTOMATED DIFF    Collection Time: 12/08/21  7:45 PM   Result Value Ref Range    WBC 7.9 3.6 - 11.0 K/uL    RBC 3.74 (L) 3.80 - 5.20 M/uL    HGB 12.4 11.5 - 16.0 g/dL    HCT 37.3 35.0 - 47.0 %    MCV 99.7 (H) 80.0 - 99.0 FL    MCH 33.2 26.0 - 34.0 PG    MCHC 33.2 30.0 - 36.5 g/dL    RDW 13.1 11.5 - 14.5 %    PLATELET 928 934 - 012 K/uL    MPV 10.3 8.9 - 12.9 FL    NRBC 0.0 0.0  WBC    ABSOLUTE NRBC 0.00 0.00 - 0.01 K/uL    NEUTROPHILS 58 32 - 75 % LYMPHOCYTES 30 12 - 49 %    MONOCYTES 9 5 - 13 %    EOSINOPHILS 2 0 - 7 %    BASOPHILS 0 0 - 1 %    IMMATURE GRANULOCYTES 1 (H) 0 - 0.5 %    ABS. NEUTROPHILS 4.6 1.8 - 8.0 K/UL    ABS. LYMPHOCYTES 2.4 0.8 - 3.5 K/UL    ABS. MONOCYTES 0.7 0.0 - 1.0 K/UL    ABS. EOSINOPHILS 0.2 0.0 - 0.4 K/UL    ABS. BASOPHILS 0.0 0.0 - 0.1 K/UL    ABS. IMM. GRANS. 0.0 0.00 - 0.04 K/UL    DF AUTOMATED     METABOLIC PANEL, COMPREHENSIVE    Collection Time: 12/08/21  7:45 PM   Result Value Ref Range    Sodium 138 136 - 145 mmol/L    Potassium 4.0 3.5 - 5.1 mmol/L    Chloride 105 97 - 108 mmol/L    CO2 25 21 - 32 mmol/L    Anion gap 8 5 - 15 mmol/L    Glucose 317 (H) 65 - 100 mg/dL    BUN 28 (H) 6 - 20 mg/dL    Creatinine 1.06 (H) 0.55 - 1.02 mg/dL    BUN/Creatinine ratio 26 (H) 12 - 20      GFR est AA >60 >60 ml/min/1.73m2    GFR est non-AA 51 (L) >60 ml/min/1.73m2    Calcium 9.4 8.5 - 10.1 mg/dL    Bilirubin, total 0.2 0.2 - 1.0 mg/dL    AST (SGOT) 12 (L) 15 - 37 U/L    ALT (SGPT) 20 12 - 78 U/L    Alk.  phosphatase 79 45 - 117 U/L    Protein, total 7.0 6.4 - 8.2 g/dL    Albumin 3.8 3.5 - 5.0 g/dL    Globulin 3.2 2.0 - 4.0 g/dL    A-G Ratio 1.2 1.1 - 2.2     PROTHROMBIN TIME + INR    Collection Time: 12/08/21  7:45 PM   Result Value Ref Range    Prothrombin time 12.3 11.9 - 14.7 sec    INR 0.9 0.9 - 1.1     PTT    Collection Time: 12/08/21  7:45 PM   Result Value Ref Range    aPTT 23.6 21.2 - 34.1 sec    aPTT, therapeutic range   82 - 109 sec   TROPONIN-HIGH SENSITIVITY    Collection Time: 12/08/21  7:45 PM   Result Value Ref Range    Troponin-High Sensitivity 6 0 - 51 ng/L   LACTIC ACID    Collection Time: 12/08/21  7:47 PM   Result Value Ref Range    Lactic acid 1.6 0.4 - 2.0 mmol/L   TYPE & SCREEN    Collection Time: 12/08/21  7:47 PM   Result Value Ref Range    Crossmatch Expiration 12/11/2021,2359     ABO/Rh(D) A Positive     Antibody screen Negative        Assessment:  Active Problems:    TIA (transient ischemic attack) (12/8/2021)      Slurred speech (12/8/2021)    Slurred speech/facial droop/left upper extremity weakness  -TIA versus CVA  -Noncontrast CT head without acute intracranial process, chronic bilateral basal ganglia infarct seen.   CTA head is pending  -Obtain MRI brain  -Neurochecks  -Initiate aspirin, Plavix and statin    DM type II  -Fingerstick monitoring, corrective insulin  -She is on oral hypoglycemic agent and insulin at home, details unknown    Hypertension  -Allow permissive hypertension  -Initiate home antihypertensives once patient is able to provide list    DVT prophylaxis: Lovenox    Full code      Signed:  Chalino Carmichael MD 12/8/2021

## 2021-12-09 NOTE — PROGRESS NOTES
OCCUPATIONAL THERAPY EVALUATION/DISCHARGE  Patient: Janet Hancock (73 y.o. female)  Date: 12/9/2021  Primary Diagnosis: Slurred speech [R47.81]  TIA (transient ischemic attack) [G45.9]        Precautions:        ASSESSMENT  Patient is 79 y/o female came to [facility] with with left facial droop, RUE involuntary movement with numbness and weakness since around 6:30pm and placed under observation 12/8/2021 for r/o CVA (head CT showing no acute intracranial abnormalities, chronic ton basal ganglia infarcts). Pt has hx of CVA without residual deficits, HTN, DM type II. Pt received semi supine in bed A&Ox4 and agreeable for OT eval/tx. Per pt report, pt lives with mother (is primary caretaker of mother who is 81 y/o, sisters assist as needed and visit often) in one story home with ramp to enter and pt is independent for self care and functional transfers/mobility. Pt currently presents with intact ton UE strength, intact ton UE coordiantion, intact ton UE sensation and close to/at baseline independent functional status for self care (independent LB dressing, independent grooming standing sink, independent toileting/cloth mgmt) and functional transfers/mobility (independent sit<->stand and toilet transfer with gait belt). Pt with no acute OT needs at this time thus will D/C pt from skilled OT services after eval. Recommend discharge to home independently when medically appropriate.        Current Level of Function (ADLs/self-care): independent for self care and transfers    Other factors to consider for discharge: at baseline functional status, good family support     PLAN :  Recommend with staff: allow toilet transfers, assist when having multiple lines    Recommendation for discharge: (in order for the patient to meet his/her long term goals)  Home independently     This discharge recommendation:  Has been made in collaboration with the attending provider and/or case management    IF patient discharges home will need the following DME: none       SUBJECTIVE:   Patient stated my sisters help as needed.     OBJECTIVE DATA SUMMARY:   HISTORY:   History reviewed. No pertinent past medical history. History reviewed. No pertinent surgical history. Prior Level of Function/Environment/Context: Pt independent for ADLS/IADLS, independent with mobility prior to admission. Expanded or extensive additional review of patient history:   Home Situation  Home Environment: Private residence  # Steps to Enter: 0  Wheelchair Ramp: Yes  One/Two Story Residence: One story  Living Alone: No  Support Systems: Parent(s) (pt is primary caretaker of mother)  Patient Expects to be Discharged to[de-identified] Unknown  Current DME Used/Available at Home: Glucometer    EXAMINATION OF PERFORMANCE DEFICITS:  Cognitive/Behavioral Status:  Neurologic State: Alert  Orientation Level: Oriented X4  Cognition: Follows commands     Hearing: Auditory  Auditory Impairment: Hard of hearing, right side    Range of Motion:  AROM: Within functional limits  PROM: Within functional limits     Strength:  Strength: Within functional limits     Coordination:  Coordination: Within functional limits  Fine Motor Skills-Upper: Left Intact; Right Intact    Gross Motor Skills-Upper: Left Intact; Right Intact    Tone & Sensation:  Tone: Normal  Sensation: Intact     Balance:  Sitting: Intact; Without support  Standing: Intact;  Without support    Functional Mobility and Transfers for ADLs:  Bed Mobility:  Scooting: Independent    Transfers:  Sit to Stand: Independent  Stand to Sit: Independent  Bed to Chair: Independent  Bathroom Mobility: Independent  Toilet Transfer : Independent  Assistive Device : Gait Belt    ADL Assessment:     Oral Facial Hygiene/Grooming: Independent    Lower Body Dressing: Independent    Toileting: Independent (simulated)     ADL Intervention and task modifications:     Grooming  Grooming Assistance: Independent  Position Performed: Standing  Washing Face: Independent  Washing Hands: Independent  Brushing Teeth: Independent  Brushing/Combing Hair: Independent    Lower Body Dressing Assistance  Socks: Independent  Leg Crossed Method Used: Yes  Position Performed: Seated edge of bed    Toileting  Toileting Assistance: Independent  Clothing Management: [address] AM-PACTM \"6 Clicks\"                                                       Daily Activity Inpatient Short Form  How much help from another person does the patient currently need. .. Total; A Lot A Little None   1. Putting on and taking off regular lower body clothing? []  1 []  2 []  3 [x]  4   2. Bathing (including washing, rinsing, drying)? []  1 []  2 []  3 [x]  4   3. Toileting, which includes using toilet, bedpan or urinal? [] 1 []  2 []  3 [x]  4   4. Putting on and taking off regular upper body clothing? []  1 []  2 []  3 [x]  4   5. Taking care of personal grooming such as brushing teeth? []  1 []  2 []  3 [x]  4   6. Eating meals? []  1 []  2 []  3 [x]  4   © 2007, [address] [address], under license to [employer]. All rights reserved     Score: 24/24     Interpretation of Tool:  Represents clinically-significant functional categories (i.e. Activities of daily living).   Percentage of Impairment CH    0%   CI    1-19% CJ    20-39% CK    40-59% CL    60-79% CM    80-99% CN     100%   [facility]  Score 6-24 24 23 20-22 15-19 10-14 7-9 6         Occupational Therapy Evaluation Charge Determination   History Examination Decision-Making   LOW Complexity : Brief history review  LOW Complexity : 1-3 performance deficits relating to physical, cognitive , or psychosocial skils that result in activity limitations and / or participation restrictions  LOW Complexity : No comorbidities that affect functional and no verbal or physical assistance needed to complete eval tasks       Based on the above components, the patient evaluation is determined to be of the following complexity level: LOW   Pain Rating:  No pain reported    Activity Tolerance:   Good  Please refer to the flowsheet for vital signs taken during this treatment. After treatment patient left in no apparent distress:    Sitting in chair, Call bell within reach, and Caregiver / family present    COMMUNICATION/EDUCATION:   The patients plan of care was discussed with: Physical therapist and Registered nurse.        Thank you for this referral.  Mykel Leiv  Time Calculation: 17 mins

## 2021-12-09 NOTE — PROGRESS NOTES
Hospitalist Progress Note               Daily Progress Note: 12/9/2021      Subjective: The patient is seen for follow up. She is a 82-year-old female with history of CVA without deficit, hypertension and diabetes who presented the ED last night with slurred speech and transient left upper extremity involuntary movement and weakness which started around 6:30 PM.  No symptoms resolved in the ED. This morning she is doing well although complains of slow speech    She was started on aspirin and Plavix by the admitting physician. MRI done this morning shows an acute left cerebellar infarct    Problem List:  Problem List as of 12/9/2021 Never Reviewed          Codes Class Noted - Resolved    TIA (transient ischemic attack) ICD-10-CM: G45.9  ICD-9-CM: 435.9  12/8/2021 - Present        Slurred speech ICD-10-CM: R47.81  ICD-9-CM: 784.59  12/8/2021 - Present              Medications reviewed  Current Facility-Administered Medications   Medication Dose Route Frequency    acetaminophen (TYLENOL) tablet 650 mg  650 mg Oral Q4H PRN    Or    acetaminophen (TYLENOL) solution 650 mg  650 mg Per NG tube Q4H PRN    Or    acetaminophen (TYLENOL) suppository 650 mg  650 mg Rectal Q4H PRN    0.9% sodium chloride infusion  75 mL/hr IntraVENous CONTINUOUS    clopidogreL (PLAVIX) tablet 75 mg  75 mg Oral DAILY    aspirin tablet 325 mg  325 mg Oral DAILY    enoxaparin (LOVENOX) injection 40 mg  40 mg SubCUTAneous Q24H    insulin lispro (HUMALOG) injection   SubCUTAneous AC&HS    glucose chewable tablet 16 g  4 Tablet Oral PRN    dextrose (D50W) injection syrg 12.5-25 g  25-50 mL IntraVENous PRN    glucagon (GLUCAGEN) injection 1 mg  1 mg IntraMUSCular PRN    insulin glargine (LANTUS) injection 10 Units  10 Units SubCUTAneous QHS       Review of Systems:   A comprehensive review of systems was negative except for that written in the HPI.     Objective:   Physical Exam:     Visit Vitals  /64 (BP 1 Location: Left upper arm, BP Patient Position: At rest)   Pulse 81   Temp 97.6 °F (36.4 °C)   Resp 18   Ht 5' 3\" (1.6 m)   Wt 77.1 kg (170 lb)   SpO2 96%   BMI 30.11 kg/m²      O2 Device: None (Room air)    Temp (24hrs), Av °F (36.7 °C), Min:97.6 °F (36.4 °C), Max:98.3 °F (36.8 °C)    No intake/output data recorded. No intake/output data recorded. General:   Awake and alert   Lungs:   Clear to auscultation bilaterally. Chest wall:  No tenderness or deformity. Heart:  Regular rate and rhythm, S1, S2 normal, no murmur, click, rub or gallop. Abdomen:   Soft, non-tender. Bowel sounds normal. No masses,  No organomegaly. Extremities: Extremities normal, atraumatic, no cyanosis or edema. Pulses: 2+ and symmetric all extremities. Skin: Skin color, texture, turgor normal. No rashes or lesions   Neurologic: CNII-XII intact. No gross focal deficits         Data Review:       Recent Days:  Recent Labs     21   WBC 7.9   HGB 12.4   HCT 37.3        Recent Labs     21      K 4.0      CO2 25   *   BUN 28*   CREA 1.06*   CA 9.4   ALB 3.8   TBILI 0.2   ALT 20   INR 0.9     No results for input(s): PH, PCO2, PO2, HCO3, FIO2 in the last 72 hours. 24 Hour Results:  Recent Results (from the past 24 hour(s))   CBC WITH AUTOMATED DIFF    Collection Time: 21  7:45 PM   Result Value Ref Range    WBC 7.9 3.6 - 11.0 K/uL    RBC 3.74 (L) 3.80 - 5.20 M/uL    HGB 12.4 11.5 - 16.0 g/dL    HCT 37.3 35.0 - 47.0 %    MCV 99.7 (H) 80.0 - 99.0 FL    MCH 33.2 26.0 - 34.0 PG    MCHC 33.2 30.0 - 36.5 g/dL    RDW 13.1 11.5 - 14.5 %    PLATELET 521 749 - 357 K/uL    MPV 10.3 8.9 - 12.9 FL    NRBC 0.0 0.0  WBC    ABSOLUTE NRBC 0.00 0.00 - 0.01 K/uL    NEUTROPHILS 58 32 - 75 %    LYMPHOCYTES 30 12 - 49 %    MONOCYTES 9 5 - 13 %    EOSINOPHILS 2 0 - 7 %    BASOPHILS 0 0 - 1 %    IMMATURE GRANULOCYTES 1 (H) 0 - 0.5 %    ABS. NEUTROPHILS 4.6 1.8 - 8.0 K/UL    ABS.  LYMPHOCYTES 2.4 0.8 - 3.5 K/UL    ABS. MONOCYTES 0.7 0.0 - 1.0 K/UL    ABS. EOSINOPHILS 0.2 0.0 - 0.4 K/UL    ABS. BASOPHILS 0.0 0.0 - 0.1 K/UL    ABS. IMM. GRANS. 0.0 0.00 - 0.04 K/UL    DF AUTOMATED     METABOLIC PANEL, COMPREHENSIVE    Collection Time: 12/08/21  7:45 PM   Result Value Ref Range    Sodium 138 136 - 145 mmol/L    Potassium 4.0 3.5 - 5.1 mmol/L    Chloride 105 97 - 108 mmol/L    CO2 25 21 - 32 mmol/L    Anion gap 8 5 - 15 mmol/L    Glucose 317 (H) 65 - 100 mg/dL    BUN 28 (H) 6 - 20 mg/dL    Creatinine 1.06 (H) 0.55 - 1.02 mg/dL    BUN/Creatinine ratio 26 (H) 12 - 20      GFR est AA >60 >60 ml/min/1.73m2    GFR est non-AA 51 (L) >60 ml/min/1.73m2    Calcium 9.4 8.5 - 10.1 mg/dL    Bilirubin, total 0.2 0.2 - 1.0 mg/dL    AST (SGOT) 12 (L) 15 - 37 U/L    ALT (SGPT) 20 12 - 78 U/L    Alk.  phosphatase 79 45 - 117 U/L    Protein, total 7.0 6.4 - 8.2 g/dL    Albumin 3.8 3.5 - 5.0 g/dL    Globulin 3.2 2.0 - 4.0 g/dL    A-G Ratio 1.2 1.1 - 2.2     PROTHROMBIN TIME + INR    Collection Time: 12/08/21  7:45 PM   Result Value Ref Range    Prothrombin time 12.3 11.9 - 14.7 sec    INR 0.9 0.9 - 1.1     PTT    Collection Time: 12/08/21  7:45 PM   Result Value Ref Range    aPTT 23.6 21.2 - 34.1 sec    aPTT, therapeutic range   82 - 109 sec   TROPONIN-HIGH SENSITIVITY    Collection Time: 12/08/21  7:45 PM   Result Value Ref Range    Troponin-High Sensitivity 6 0 - 51 ng/L   LACTIC ACID    Collection Time: 12/08/21  7:47 PM   Result Value Ref Range    Lactic acid 1.6 0.4 - 2.0 mmol/L   TYPE & SCREEN    Collection Time: 12/08/21  7:47 PM   Result Value Ref Range    Crossmatch Expiration 12/11/2021,2359     ABO/Rh(D) A Positive     Antibody screen Negative    GLUCOSE, POC    Collection Time: 12/09/21  2:40 AM   Result Value Ref Range    Glucose (POC) 353 (H) 65 - 117 mg/dL    Performed by Nicole Kirby, POC    Collection Time: 12/09/21  3:46 AM   Result Value Ref Range    Glucose (POC) 344 (H) 65 - 117 mg/dL    Performed by Ye Miranda    GLUCOSE, POC    Collection Time: 12/09/21  8:12 AM   Result Value Ref Range    Glucose (POC) 247 (H) 65 - 117 mg/dL    Performed by Marylouise Severs (SON)        CTA CODE NEURO HEAD AND NECK W CONT   Final Result      CTA of the Neck demonstrates bilateral carotid atherosclerotic disease but no   evidence of flow significant stenosis. All measurements are based on NASCET-like   criteria. Intracranial vessels appear widely patent with no evidence of intraluminal   thrombus or obvious vascular occlusion. The results were discussed with Dr. Ahmet Tenorio on 12/8/2021 at 97 319641 PM at the   completion of the performance of the examination. CT CODE NEURO HEAD WO CONTRAST   Final Result   1. No acute intracranial abnormality. 2.  Chronic bilateral basal ganglia infarcts. Report called to Dr. Ivana Cat by Dr. Delmi Trujillo at 8:33 PM on 12/8/2021. MRI BRAIN WO CONT    (Results Pending)   DUPLEX CAROTID BILATERAL    (Results Pending)        Assessment:  Acute left cerebellar infarct    History of bilateral basal ganglia infarcts    Hypertension    Diabetes mellitus type 2      Plan:  I have ordered a carotid duplex and echocardiogram to complete the work-up  Add high intensity statin  Antihypertensives on hold    Change antithrombotic therapy to aspirin only, discussed with neurologist    PT and OT not recommending rehab    I have requested neurology consult, discussed with Dr. Eduarda Bradshaw    Possible discharge later today if echo and carotid duplex are completed. If not, change to inpatient status    Care Plan discussed with: Patient/Family    Disposition: Pending above work-up    Total time spent with patient: 30 minutes.     Kimani Ladd MD

## 2021-12-10 NOTE — PROGRESS NOTES
Called attending on speaker phone for him to give pt her results of mri and echo. Discharge nurse did the rest of the dc process, I did wheel her down to the front entrance where she departed in private vehicle with her sister.

## 2022-03-11 ENCOUNTER — OFFICE VISIT (OUTPATIENT)
Dept: ENDOCRINOLOGY | Age: 77
End: 2022-03-11
Payer: MEDICARE

## 2022-03-11 VITALS
TEMPERATURE: 97.7 F | WEIGHT: 162.1 LBS | BODY MASS INDEX: 28.72 KG/M2 | HEIGHT: 63 IN | DIASTOLIC BLOOD PRESSURE: 70 MMHG | OXYGEN SATURATION: 99 % | RESPIRATION RATE: 18 BRPM | SYSTOLIC BLOOD PRESSURE: 143 MMHG | HEART RATE: 72 BPM

## 2022-03-11 DIAGNOSIS — E11.69 TYPE 2 DIABETES MELLITUS WITH OTHER SPECIFIED COMPLICATION, WITH LONG-TERM CURRENT USE OF INSULIN (HCC): Primary | ICD-10-CM

## 2022-03-11 DIAGNOSIS — Z79.4 TYPE 2 DIABETES MELLITUS WITH OTHER SPECIFIED COMPLICATION, WITH LONG-TERM CURRENT USE OF INSULIN (HCC): Primary | ICD-10-CM

## 2022-03-11 DIAGNOSIS — Z86.73 HISTORY OF CVA (CEREBROVASCULAR ACCIDENT): ICD-10-CM

## 2022-03-11 DIAGNOSIS — I10 BENIGN ESSENTIAL HTN: ICD-10-CM

## 2022-03-11 DIAGNOSIS — E78.2 MIXED HYPERLIPIDEMIA: ICD-10-CM

## 2022-03-11 PROBLEM — E11.9 DIABETES MELLITUS (HCC): Status: ACTIVE | Noted: 2022-03-11

## 2022-03-11 LAB
GLUCOSE POC: 135 MG/DL
HBA1C MFR BLD HPLC: 7.4 %

## 2022-03-11 PROCEDURE — 3017F COLORECTAL CA SCREEN DOC REV: CPT | Performed by: INTERNAL MEDICINE

## 2022-03-11 PROCEDURE — G8419 CALC BMI OUT NRM PARAM NOF/U: HCPCS | Performed by: INTERNAL MEDICINE

## 2022-03-11 PROCEDURE — 82962 GLUCOSE BLOOD TEST: CPT | Performed by: INTERNAL MEDICINE

## 2022-03-11 PROCEDURE — G8400 PT W/DXA NO RESULTS DOC: HCPCS | Performed by: INTERNAL MEDICINE

## 2022-03-11 PROCEDURE — 1090F PRES/ABSN URINE INCON ASSESS: CPT | Performed by: INTERNAL MEDICINE

## 2022-03-11 PROCEDURE — G8510 SCR DEP NEG, NO PLAN REQD: HCPCS | Performed by: INTERNAL MEDICINE

## 2022-03-11 PROCEDURE — 1101F PT FALLS ASSESS-DOCD LE1/YR: CPT | Performed by: INTERNAL MEDICINE

## 2022-03-11 PROCEDURE — 99204 OFFICE O/P NEW MOD 45 MIN: CPT | Performed by: INTERNAL MEDICINE

## 2022-03-11 PROCEDURE — G8536 NO DOC ELDER MAL SCRN: HCPCS | Performed by: INTERNAL MEDICINE

## 2022-03-11 PROCEDURE — G8427 DOCREV CUR MEDS BY ELIG CLIN: HCPCS | Performed by: INTERNAL MEDICINE

## 2022-03-11 PROCEDURE — G8753 SYS BP > OR = 140: HCPCS | Performed by: INTERNAL MEDICINE

## 2022-03-11 PROCEDURE — 83036 HEMOGLOBIN GLYCOSYLATED A1C: CPT | Performed by: INTERNAL MEDICINE

## 2022-03-11 PROCEDURE — G8754 DIAS BP LESS 90: HCPCS | Performed by: INTERNAL MEDICINE

## 2022-03-11 PROCEDURE — 2022F DILAT RTA XM EVC RTNOPTHY: CPT | Performed by: INTERNAL MEDICINE

## 2022-03-11 PROCEDURE — 3051F HG A1C>EQUAL 7.0%<8.0%: CPT | Performed by: INTERNAL MEDICINE

## 2022-03-11 NOTE — PATIENT INSTRUCTIONS
Stop Novolog. Increase Lantus to 10 units at bedtime    Continue Janumet  mg twice a day    Check glucose before breakfast and dinner and bring meter to next visit.

## 2022-03-11 NOTE — PROGRESS NOTES
1. \"Have you been to the ER, urgent care clinic since your last visit? Hospitalized since your last visit? \" No    2. \"Have you seen or consulted any other health care providers outside of the [address] [place] [address] since your last visit? \" No     3. For patients aged 39-70: Has the patient had a colonoscopy / FIT/ Cologuard? Yes - no Care Gap present      If the patient is female:    4. For patients aged 41-77: Has the patient had a mammogram within the past 2 years? Yes - no Care Gap present      5. For patients aged 21-65: Has the patient had a pap smear?  No

## 2022-03-11 NOTE — PROGRESS NOTES
History and Physical    Patient: May Reyes MRN: [id number]  SSN: [id number]    YOB: 1945  Age: 78 y.o. Sex: female      Subjective:      May Reyes is a 78 y.o. female with past medical history of hypertension, hyperlipidemia, CVA is sent to me by primary care physician Dr. Senthil Rice for type 2 diabetes mellitus. Patient was diagnosed with diabetes around 15 years back. Currently taking Janumet  mg twice a day. She was started on insulin around 6 months back. She tells me that she had stroke sometime last year after which she started to take better care of herself, her diabetes control was very bad at that time because she was noncompliant with diabetic diet. Since then she has made several changes in her eating habits and she was started on insulin. She is taking Lantus 4.5 units at bedtime, NovoLog sliding scale, anywhere from 2 to 6 units 3 times a day before meals. Did not bring glucometer today. She tells me that her fasting readings are generally in 200s, later in the day her glucose readings are better. She generally does not eat dinner but snacks on chips, yogurt, etc. at that time. Denies any hypoglycemia. Needs to make follow-up for diabetic eye exam.  She lives at home with her 80-year-old mother and takes care of her. Glucometer reading: Did not bring glucometer today    · Diagnosis: 15 years  · Current treatment: Janumet  mg bid, Lantus ?  4.5 units, Novolog 2-6 units 3 times a day  · Past treatment: not sure  · Glucose checks: 3 times a day fastins, lunch: 130s, dinner: same  · Hyperglycemia:   · Hypoglycemia:   · Meals per day: 3, breakfast: eggs, cheese or omlette, or toast with peanut butter, lunch: eggs or eats out salad, dinner: skips, snacks: bedtime yogurt or chips, diet drink,   · Exercise: no  · DM related hospitalizations: no    Smoking: no  Family history of coronary artery disease/stroke: NA    Complications of DM: · CAD: no  · CVA: yes 2021  · PVD: no  · Amputations: no   · Retinopathy: no; last exam was 1 year back   · Gastropathy: no  · Nephropathy: no  · Neuropathy: no   Sees podiatrist:    Medications:  · Statin: atorvastatin 40  · ACE-I: benazapril  · ASA: yes    · Diabetes education: ? no    History reviewed. No pertinent past medical history. Past Surgical History:   Procedure Laterality Date    HX BACK SURGERY        Family History   Problem Relation Age of Onset    Immune Disorder Sister      Social History     Tobacco Use    Smoking status: Never Smoker    Smokeless tobacco: Never Used   Substance Use Topics    Alcohol use: Not Currently      Prior to Admission medications    Medication Sig Start Date End Date Taking? Authorizing Provider   amLODIPine-benazepril (LOTREL) 10-40 mg per capsule Take 1 Capsule by mouth daily. Yes Provider, Historical   atenoloL-chlorthalidone (TENORETIC) 50-25 mg per tablet Take 1 Tablet by mouth daily. Yes Provider, Historical   ergocalciferol (Vitamin D2) 1,250 mcg (50,000 unit) capsule Take 50,000 Units by mouth every seven (7) days. Yes Provider, Historical   SITagliptin-metFORMIN (Janumet) 50-1,000 mg per tablet Take 1 Tablet by mouth daily (with breakfast). Yes Provider, Historical   insulin glargine (Lantus Solostar U-100 Insulin) 100 unit/mL (3 mL) inpn 20 Units by SubCUTAneous route nightly. Yes Provider, Historical   atorvastatin (LIPITOR) 40 mg tablet Take 1 Tablet by mouth daily. 12/10/21  Yes Leah Mai MD   aspirin 81 mg chewable tablet Take 1 Tablet by mouth daily.  12/10/21  Yes Leah Mai MD        No Known Allergies    Review of Systems:  ROS    A comprehensive review of systems was preformed and it is negative except mentioned in HPI    Objective:     Vitals:    03/11/22 1032   BP: (!) 143/70   Pulse: 72   Resp: 18   Temp: 97.7 °F (36.5 °C)   TempSrc: Oral   SpO2: 99%   Weight: 162 lb 1.6 oz (73.5 kg)   Height: 5' 3\" (1.6 m) Physical Exam:    Physical Exam  Vitals and nursing note reviewed. Constitutional:       Appearance: Normal appearance. HENT:      Head: Normocephalic and atraumatic. Eyes:      Extraocular Movements: Extraocular movements intact. Pupils: Pupils are equal, round, and reactive to light. Cardiovascular:      Rate and Rhythm: Normal rate and regular rhythm. Pulmonary:      Effort: Pulmonary effort is normal.      Breath sounds: Normal breath sounds. Musculoskeletal:         General: Normal range of motion. Cervical back: Neck supple. Skin:     General: Skin is warm. Neurological:      General: No focal deficit present. Mental Status: She is alert and oriented to person, place, and time. Psychiatric:         Mood and Affect: Mood normal.         Behavior: Behavior normal.       diabetic foot exam:  Bilateral diabetic foot exam was performed today. Dorsalis pedis pulses 1+ bilaterally. Monofilament sensation normal bilaterally. No ulcers or skin breakdown. Labs and Imaging:    Last 3 Recorded Weights in this Encounter    03/11/22 1032   Weight: 162 lb 1.6 oz (73.5 kg)        No results found for: HBA1C, RQB2QHUN, FZO8LHGI, [id number]     Assessment:     Patient Active Problem List   Diagnosis Code    TIA (transient ischemic attack) G45.9    Slurred speech R47.81    Diabetes mellitus ([facility] [address] E11.9    Benign essential HTN I10    Mixed hyperlipidemia E78.2    History of CVA (cerebrovascular accident) Z86.73           Plan:     Type 2 diabetes mellitus, uncontrolled:  I reviewed progress note and labs from the referring provider's office. Hemoglobin A1c was 13.3% on 9-, 9% on 1-, 7.4% today. Fingerstick blood glucose is 135 mg/dL in my office today. Up to date with diabetes related annual labs: Yes, September 2021  Up to date with diabetic eye exam: Due  Plan:  Discussed with patient importance of controlling diabetes to prevent endorgan damage.   Encourage patient to continue to work on diet. Advised patient to eat a small meal instead of snacking at bedtime while she is skipping dinner. She is taking negligible dose of Lantus at this time, likely by mistake. It appears that she does not need basal/bolus regimen at this time, looking at her response. Stop NovoLog. Increase Lantus to 10 units and take it every night. Continue Janumet  mg twice a day. Check blood glucose twice a day every day and I will see her back in 6 weeks with glucometer. Essential hypertension:  Blood pressure slightly elevated today. No microalbuminuria, last checked in September 2021. I will reevaluate at next visit. Mixed hyperlipidemia:  1-: Total cholesterol 145, triglycerides 198, LDL 66. Continue atorvastatin 40 mg daily at bedtime. History of CVA:  On aspirin and statin.     Orders Placed This Encounter    AMB POC GLUCOSE BLOOD, BY GLUCOSE MONITORING DEVICE    AMB POC HEMOGLOBIN A1C        Signed By: Russel Nelson MD     March 11, 2022      Return to clinic 6 weeks

## 2022-03-11 NOTE — LETTER
3/11/2022    Patient: Nilay Jackson   YOB: 1945   Date of Visit: 3/11/2022     Min Carvajal MD  [facility] [address]  Via Fax: [phone]     Morris Cheney MD  [facility] [address]  Via Fax: [phone]    Dear MD Morris Bowers MD,      Thank you for referring Ms. Geena Salomon to [address] [place] [address] for evaluation. My notes for this consultation are attached. If you have questions, please do not hesitate to call me. I look forward to following your patient along with you.       Sincerely,    Letha Loera MD

## 2022-03-18 PROBLEM — E78.2 MIXED HYPERLIPIDEMIA: Status: ACTIVE | Noted: 2022-03-11

## 2022-03-18 PROBLEM — Z86.73 HISTORY OF CVA (CEREBROVASCULAR ACCIDENT): Status: ACTIVE | Noted: 2022-03-11

## 2022-03-18 PROBLEM — E11.9 DIABETES MELLITUS (HCC): Status: ACTIVE | Noted: 2022-03-11

## 2022-03-19 PROBLEM — I10 BENIGN ESSENTIAL HTN: Status: ACTIVE | Noted: 2022-03-11

## 2022-03-19 PROBLEM — R47.81 SLURRED SPEECH: Status: ACTIVE | Noted: 2021-12-08

## 2022-03-19 PROBLEM — G45.9 TIA (TRANSIENT ISCHEMIC ATTACK): Status: ACTIVE | Noted: 2021-12-08

## 2022-04-28 ENCOUNTER — OFFICE VISIT (OUTPATIENT)
Dept: ENDOCRINOLOGY | Age: 77
End: 2022-04-28
Payer: MEDICARE

## 2022-04-28 VITALS
DIASTOLIC BLOOD PRESSURE: 71 MMHG | OXYGEN SATURATION: 98 % | HEART RATE: 74 BPM | SYSTOLIC BLOOD PRESSURE: 140 MMHG | BODY MASS INDEX: 27.46 KG/M2 | RESPIRATION RATE: 16 BRPM | HEIGHT: 63 IN | WEIGHT: 155 LBS

## 2022-04-28 DIAGNOSIS — I10 BENIGN ESSENTIAL HTN: ICD-10-CM

## 2022-04-28 DIAGNOSIS — Z79.4 TYPE 2 DIABETES MELLITUS WITH OTHER SPECIFIED COMPLICATION, WITH LONG-TERM CURRENT USE OF INSULIN (HCC): Primary | ICD-10-CM

## 2022-04-28 DIAGNOSIS — E11.69 TYPE 2 DIABETES MELLITUS WITH OTHER SPECIFIED COMPLICATION, WITH LONG-TERM CURRENT USE OF INSULIN (HCC): Primary | ICD-10-CM

## 2022-04-28 DIAGNOSIS — E78.2 MIXED HYPERLIPIDEMIA: ICD-10-CM

## 2022-04-28 DIAGNOSIS — Z86.73 HISTORY OF CVA (CEREBROVASCULAR ACCIDENT): ICD-10-CM

## 2022-04-28 LAB — GLUCOSE POC: 155 MG/DL

## 2022-04-28 PROCEDURE — G8427 DOCREV CUR MEDS BY ELIG CLIN: HCPCS | Performed by: INTERNAL MEDICINE

## 2022-04-28 PROCEDURE — G8510 SCR DEP NEG, NO PLAN REQD: HCPCS | Performed by: INTERNAL MEDICINE

## 2022-04-28 PROCEDURE — 1090F PRES/ABSN URINE INCON ASSESS: CPT | Performed by: INTERNAL MEDICINE

## 2022-04-28 PROCEDURE — G8400 PT W/DXA NO RESULTS DOC: HCPCS | Performed by: INTERNAL MEDICINE

## 2022-04-28 PROCEDURE — 82962 GLUCOSE BLOOD TEST: CPT | Performed by: INTERNAL MEDICINE

## 2022-04-28 PROCEDURE — G8536 NO DOC ELDER MAL SCRN: HCPCS | Performed by: INTERNAL MEDICINE

## 2022-04-28 PROCEDURE — 3051F HG A1C>EQUAL 7.0%<8.0%: CPT | Performed by: INTERNAL MEDICINE

## 2022-04-28 PROCEDURE — G8754 DIAS BP LESS 90: HCPCS | Performed by: INTERNAL MEDICINE

## 2022-04-28 PROCEDURE — 1101F PT FALLS ASSESS-DOCD LE1/YR: CPT | Performed by: INTERNAL MEDICINE

## 2022-04-28 PROCEDURE — 99214 OFFICE O/P EST MOD 30 MIN: CPT | Performed by: INTERNAL MEDICINE

## 2022-04-28 PROCEDURE — 3017F COLORECTAL CA SCREEN DOC REV: CPT | Performed by: INTERNAL MEDICINE

## 2022-04-28 PROCEDURE — G8753 SYS BP > OR = 140: HCPCS | Performed by: INTERNAL MEDICINE

## 2022-04-28 PROCEDURE — G8419 CALC BMI OUT NRM PARAM NOF/U: HCPCS | Performed by: INTERNAL MEDICINE

## 2022-04-28 PROCEDURE — 2022F DILAT RTA XM EVC RTNOPTHY: CPT | Performed by: INTERNAL MEDICINE

## 2022-04-28 RX ORDER — PEN NEEDLE, DIABETIC 30 GX3/16"
NEEDLE, DISPOSABLE MISCELLANEOUS
Qty: 100 EACH | Refills: 3 | Status: SHIPPED | OUTPATIENT
Start: 2022-04-28 | End: 2022-07-28 | Stop reason: SDUPTHER

## 2022-04-28 RX ORDER — INSULIN GLARGINE 100 [IU]/ML
INJECTION, SOLUTION SUBCUTANEOUS
Qty: 18 ML | Refills: 2 | Status: SHIPPED | OUTPATIENT
Start: 2022-04-28 | End: 2022-07-28 | Stop reason: SDUPTHER

## 2022-04-28 RX ORDER — INSULIN ASPART 100 [IU]/ML
INJECTION, SOLUTION INTRAVENOUS; SUBCUTANEOUS
COMMUNITY
Start: 2022-03-11 | End: 2022-07-28 | Stop reason: ALTCHOICE

## 2022-04-28 RX ORDER — SITAGLIPTIN AND METFORMIN HYDROCHLORIDE 50; 1000 MG/1; MG/1
1 TABLET, FILM COATED ORAL 2 TIMES DAILY WITH MEALS
Qty: 180 TABLET | Refills: 2 | Status: SHIPPED | OUTPATIENT
Start: 2022-04-28 | End: 2022-07-28 | Stop reason: SDUPTHER

## 2022-04-28 NOTE — LETTER
4/28/2022    Patient: Levorn Odor   YOB: 1945   Date of Visit: 4/28/2022     Dillon Evans MD  [facility] [address]  Via Fax: [phone]    Dear Dillon Evans MD,      Thank you for referring Ms. Geena Salomon to [address] [place] [address] for evaluation. My notes for this consultation are attached. If you have questions, please do not hesitate to call me. I look forward to following your patient along with you.       Sincerely,    Saeid Almodovar MD

## 2022-04-28 NOTE — PROGRESS NOTES
Chief Complaint   Patient presents with    Diabetes         Visit Vitals  BP (!) 140/71 (BP 1 Location: Left upper arm, BP Patient Position: Sitting)   Pulse 74   Resp 16   Ht 5' 3\" (1.6 m)   Wt 155 lb (70.3 kg)   SpO2 98%   BMI 27.46 kg/m²           1. Have you been to the ER, urgent care clinic since your last visit? Hospitalized since your last visit? No    2. Have you seen or consulted any other health care providers outside of the [address] [place] [address] since your last visit? Include any pap smears or colon screening.  No

## 2022-04-28 NOTE — PROGRESS NOTES
History and Physical    Patient: Jeanna Houston MRN: [id number]  SSN: [id number]    YOB: 1945  Age: 78 y.o. Sex: female      Subjective:      Jeanna Houston is a 78 y.o. female with past medical history of hypertension, hyperlipidemia, CVA is here for follow-up of type 2 diabetes mellitus. She was sent to me by primary care physician Dr. Marjorie Perez. At the last visit it was noted that patient was taking negligible amount of Lantus by mistake because she did not understand how to take it. She was taking NovoLog 2 to 6 units 3 times a day randomly. At that time we stopped all NovoLog, Lantus was increased to 10 units at bedtime and she continues to take Janumet  mg twice a day. She is doing her best in terms of diet. Checking blood glucose 2-3 times a day. Denies any hypoglycemia. She had labs done by PCP recently. Needs to make follow-up for diabetic eye exam.  She lives at home with her 22-year-old mother and takes care of her. Glucometer reading: Checking blood glucose 2-3 times a day.   Readings are ranging from 147 to 264 mg/dL    Updated diabetes history:  · Diagnosis: 15 years  · Current treatment: Janumet  mg bid, Lantus 10 units at bedtime  · Past treatment:  NovoLog (not needed)  · Glucose checks: 3 times a day fastins, lunch: 130s, dinner: same  · Hyperglycemia:   · Hypoglycemia:   · Meals per day: 3, breakfast: eggs, cheese or omlette, or toast with peanut butter, lunch: eggs or eats out salad, dinner: skips, snacks: bedtime yogurt or chips, diet drink,   · Exercise: no  · DM related hospitalizations: no    Smoking: no  Family history of coronary artery disease/stroke: NA    Complications of DM:  · CAD: no  · CVA: yes   · PVD: no  · Amputations: no   · Retinopathy: no; last exam was 1 year back   · Gastropathy: no  · Nephropathy: no  · Neuropathy: no   Sees podiatrist:    Medications:  · Statin: atorvastatin 40  · ACE-I: benazapril  · ASA: yes    · Diabetes education: ? no    No past medical history on file. Past Surgical History:   Procedure Laterality Date    HX BACK SURGERY        Family History   Problem Relation Age of Onset    Immune Disorder Sister      Social History     Tobacco Use    Smoking status: Never Smoker    Smokeless tobacco: Never Used   Substance Use Topics    Alcohol use: Not Currently      Prior to Admission medications    Medication Sig Start Date End Date Taking? Authorizing Provider   insulin glargine (Lantus Solostar U-100 Insulin) 100 unit/mL (3 mL) inpn Inject 14 units at bedtime, 90 days 4/28/22  Yes Neris Guillen MD   SITagliptin-metFORMIN (Janumet) 50-1,000 mg per tablet Take 1 Tablet by mouth two (2) times daily (with meals) for 90 days. 4/28/22 7/27/22 Yes Neris Guillen MD   Insulin Needles, Disposable, 31 gauge x 5/16\" ndle Once a day 4/28/22  Yes Neris Guillen MD   amLODIPine-benazepril (LOTREL) 10-40 mg per capsule Take 1 Capsule by mouth daily. Yes Provider, Historical   atenoloL-chlorthalidone (TENORETIC) 50-25 mg per tablet Take 1 Tablet by mouth daily. Yes Provider, Historical   ergocalciferol (Vitamin D2) 1,250 mcg (50,000 unit) capsule Take 50,000 Units by mouth every seven (7) days. Yes Provider, Historical   atorvastatin (LIPITOR) 40 mg tablet Take 1 Tablet by mouth daily. 12/10/21  Yes Farooq Hernandez MD   aspirin 81 mg chewable tablet Take 1 Tablet by mouth daily. 12/10/21  Yes Farooq Hernandez MD   NovoLOG Flexpen U-100 Insulin 100 unit/mL (3 mL) inpn  3/11/22   Provider, Historical        No Known Allergies    Review of Systems:  ROS    A comprehensive review of systems was preformed and it is negative except mentioned in HPI    Objective:     Vitals:    04/28/22 1141 04/28/22 1151   BP: (!) 140/71 (!) 140/71   Pulse: 74    Resp: 16    SpO2: 98%    Weight: 155 lb (70.3 kg)    Height: 5' 3\" (1.6 m)         Physical Exam:    Physical Exam  Vitals and nursing note reviewed. Constitutional:       Appearance: Normal appearance. HENT:      Head: Normocephalic and atraumatic. Cardiovascular:      Rate and Rhythm: Normal rate and regular rhythm. Pulmonary:      Effort: Pulmonary effort is normal.      Breath sounds: Normal breath sounds. Neurological:      Mental Status: She is alert. 3-:  diabetic foot exam:  Bilateral diabetic foot exam was performed today. Dorsalis pedis pulses 1+ bilaterally. Monofilament sensation normal bilaterally. No ulcers or skin breakdown. Labs and Imaging:    Last 3 Recorded Weights in this Encounter    04/28/22 1141   Weight: 155 lb (70.3 kg)        No results found for: HBA1C, [id number], WMN5PDXA, [id number]     Assessment:     Patient Active Problem List   Diagnosis Code    TIA (transient ischemic attack) G45.9    Slurred speech R47.81    Diabetes mellitus ([facility] [address] E11.9    Benign essential HTN I10    Mixed hyperlipidemia E78.2    History of CVA (cerebrovascular accident) Z86.73           Plan:     Type 2 diabetes mellitus, uncontrolled:  Hemoglobin A1c was 13.3% on 9-, 9% on 1-, 7.4% on 3-, 7.1% on 4- checked by PCP. Fingerstick blood glucose is 155 mg/dL in my office today. Up to date with diabetes related annual labs: Yes, 4- except urine microalbumin/creatinine ratio  Up to date with diabetic eye exam: Due  Plan:  Discussed with patient importance of controlling diabetes to prevent endorgan damage. Encourage patient to continue to work on diet. Increase Lantus to 14 units at bedtime, continue to stay off of NovoLog. Continue Janumet 50- 1000 mg twice a day. Check blood glucose twice a day every day and I will see her back in 3 months. Essential hypertension:  Blood pressure slightly elevated today, which may be acceptable given patient's age. No microalbuminuria, last checked in September 2021. Mixed hyperlipidemia:  1-:  Total cholesterol 145, triglycerides 198, LDL 66, 4-: Total cholesterol 149, triglycerides 150, LDL 73. Continue atorvastatin 40 mg daily at bedtime. History of CVA:  On aspirin and statin. Orders Placed This Encounter    AMB POC GLUCOSE BLOOD, BY GLUCOSE MONITORING DEVICE    insulin glargine (Lantus Solostar U-100 Insulin) 100 unit/mL (3 mL) inpn     Sig: Inject 14 units at bedtime, 90 days     Dispense:  18 mL     Refill:  2     DC Novolog    SITagliptin-metFORMIN (Janumet) 50-1,000 mg per tablet     Sig: Take 1 Tablet by mouth two (2) times daily (with meals) for 90 days.      Dispense:  180 Tablet     Refill:  2    Insulin Needles, Disposable, 31 gauge x 5/16\" ndle     Sig: Once a day     Dispense:  100 Each     Refill:  3        Signed By: Allen Ye MD     April 28, 2022      Return to clinic 3 months

## 2022-06-20 LAB — MAMMOGRAPHY, EXTERNAL: NORMAL

## 2022-07-28 ENCOUNTER — OFFICE VISIT (OUTPATIENT)
Dept: ENDOCRINOLOGY | Age: 77
End: 2022-07-28
Payer: MEDICARE

## 2022-07-28 VITALS
BODY MASS INDEX: 26.75 KG/M2 | SYSTOLIC BLOOD PRESSURE: 132 MMHG | WEIGHT: 151 LBS | TEMPERATURE: 98.6 F | OXYGEN SATURATION: 99 % | DIASTOLIC BLOOD PRESSURE: 60 MMHG | HEIGHT: 63 IN | HEART RATE: 75 BPM

## 2022-07-28 DIAGNOSIS — E78.2 MIXED HYPERLIPIDEMIA: ICD-10-CM

## 2022-07-28 DIAGNOSIS — E11.69 TYPE 2 DIABETES MELLITUS WITH OTHER SPECIFIED COMPLICATION, WITH LONG-TERM CURRENT USE OF INSULIN (HCC): Primary | ICD-10-CM

## 2022-07-28 DIAGNOSIS — Z79.4 TYPE 2 DIABETES MELLITUS WITH OTHER SPECIFIED COMPLICATION, WITH LONG-TERM CURRENT USE OF INSULIN (HCC): Primary | ICD-10-CM

## 2022-07-28 LAB
GLUCOSE POC: 147 MG/DL
HBA1C MFR BLD HPLC: 6.4 %

## 2022-07-28 PROCEDURE — G8400 PT W/DXA NO RESULTS DOC: HCPCS | Performed by: INTERNAL MEDICINE

## 2022-07-28 PROCEDURE — 82962 GLUCOSE BLOOD TEST: CPT | Performed by: INTERNAL MEDICINE

## 2022-07-28 PROCEDURE — 99214 OFFICE O/P EST MOD 30 MIN: CPT | Performed by: INTERNAL MEDICINE

## 2022-07-28 PROCEDURE — G8536 NO DOC ELDER MAL SCRN: HCPCS | Performed by: INTERNAL MEDICINE

## 2022-07-28 PROCEDURE — 83036 HEMOGLOBIN GLYCOSYLATED A1C: CPT | Performed by: INTERNAL MEDICINE

## 2022-07-28 PROCEDURE — G8427 DOCREV CUR MEDS BY ELIG CLIN: HCPCS | Performed by: INTERNAL MEDICINE

## 2022-07-28 PROCEDURE — G8754 DIAS BP LESS 90: HCPCS | Performed by: INTERNAL MEDICINE

## 2022-07-28 PROCEDURE — G8417 CALC BMI ABV UP PARAM F/U: HCPCS | Performed by: INTERNAL MEDICINE

## 2022-07-28 PROCEDURE — 3044F HG A1C LEVEL LT 7.0%: CPT | Performed by: INTERNAL MEDICINE

## 2022-07-28 PROCEDURE — 1101F PT FALLS ASSESS-DOCD LE1/YR: CPT | Performed by: INTERNAL MEDICINE

## 2022-07-28 PROCEDURE — G8510 SCR DEP NEG, NO PLAN REQD: HCPCS | Performed by: INTERNAL MEDICINE

## 2022-07-28 PROCEDURE — 1123F ACP DISCUSS/DSCN MKR DOCD: CPT | Performed by: INTERNAL MEDICINE

## 2022-07-28 PROCEDURE — 3017F COLORECTAL CA SCREEN DOC REV: CPT | Performed by: INTERNAL MEDICINE

## 2022-07-28 PROCEDURE — 2022F DILAT RTA XM EVC RTNOPTHY: CPT | Performed by: INTERNAL MEDICINE

## 2022-07-28 PROCEDURE — 1090F PRES/ABSN URINE INCON ASSESS: CPT | Performed by: INTERNAL MEDICINE

## 2022-07-28 PROCEDURE — G8752 SYS BP LESS 140: HCPCS | Performed by: INTERNAL MEDICINE

## 2022-07-28 RX ORDER — ATORVASTATIN CALCIUM 40 MG/1
40 TABLET, FILM COATED ORAL DAILY
Qty: 90 TABLET | Refills: 3 | Status: SHIPPED | OUTPATIENT
Start: 2022-07-28 | End: 2022-07-28 | Stop reason: SDUPTHER

## 2022-07-28 RX ORDER — ATORVASTATIN CALCIUM 40 MG/1
40 TABLET, FILM COATED ORAL DAILY
Qty: 90 TABLET | Refills: 3 | Status: SHIPPED | OUTPATIENT
Start: 2022-07-28 | End: 2022-10-26

## 2022-07-28 RX ORDER — SIMVASTATIN 20 MG/1
TABLET, FILM COATED ORAL
COMMUNITY
Start: 2022-06-09 | End: 2022-07-28 | Stop reason: ALTCHOICE

## 2022-07-28 RX ORDER — PEN NEEDLE, DIABETIC 31 GX3/16"
NEEDLE, DISPOSABLE MISCELLANEOUS
COMMUNITY
Start: 2022-06-09

## 2022-07-28 RX ORDER — SITAGLIPTIN AND METFORMIN HYDROCHLORIDE 50; 1000 MG/1; MG/1
1 TABLET, FILM COATED ORAL 2 TIMES DAILY WITH MEALS
Qty: 180 TABLET | Refills: 3 | Status: SHIPPED | OUTPATIENT
Start: 2022-07-28 | End: 2022-10-26

## 2022-07-28 RX ORDER — INSULIN GLARGINE 100 [IU]/ML
INJECTION, SOLUTION SUBCUTANEOUS
Qty: 18 ML | Refills: 3 | Status: SHIPPED | OUTPATIENT
Start: 2022-07-28

## 2022-07-28 RX ORDER — IBANDRONATE SODIUM 150 MG/1
TABLET, FILM COATED ORAL
COMMUNITY
Start: 2022-07-06

## 2022-07-28 RX ORDER — PEN NEEDLE, DIABETIC 30 GX3/16"
NEEDLE, DISPOSABLE MISCELLANEOUS
Qty: 100 EACH | Refills: 3 | Status: SHIPPED | OUTPATIENT
Start: 2022-07-28

## 2022-07-28 RX ORDER — MAGNESIUM 200 MG
TABLET ORAL
COMMUNITY
Start: 2022-04-28

## 2022-07-28 RX ORDER — CLOPIDOGREL BISULFATE 75 MG/1
TABLET ORAL
COMMUNITY
Start: 2022-06-10

## 2022-07-28 NOTE — LETTER
7/28/2022    Patient: Jorgito Simmons   YOB: 1945   Date of Visit: 7/28/2022     Pasha Capellan MD  [facility] [id number]  Via Fax: [phone]    Dear Pasha Capellan MD,      Thank you for referring Ms. Geena Salomon to [address] [place] [address] for evaluation. My notes for this consultation are attached. If you have questions, please do not hesitate to call me. I look forward to following your patient along with you.       Sincerely,    Suzie Turner MD

## 2022-07-28 NOTE — PROGRESS NOTES
History and Physical    Patient: Marv Bledsoe MRN: [id number]  SSN: [id number]    YOB: 1945  Age: 78 y.o. Sex: female      Subjective:      Marv Bledsoe is a 78 y.o. female with past medical history of hypertension, hyperlipidemia, CVA is here for follow-up of type 2 diabetes mellitus. She was sent to me by primary care physician Dr. Joesph Deleon. At the last visit it was noted that patient was taking negligible amount of Lantus by mistake because she did not understand how to take it. She was taking NovoLog 2 to 6 units 3 times a day randomly. At that time we stopped all NovoLog, Lantus was increased to 10 units at bedtime and then it was increased to 14 units at bedtime and she continues to take Janumet  mg twice a day. She is doing her best in terms of diet. Checking blood glucose 2-3 times a day. Denies any hypoglycemia. Did not bring glucometer today. She lives at home with her 79-year-old mother and takes care of her. Glucometer reading: Did not bring glucometer today    Updated diabetes history:  · Diagnosis: 15 years  · Current treatment: Janumet  mg bid, Lantus 14 units at bedtime  · Past treatment:  NovoLog (not needed)  · Glucose checks: 3 times a day fastins, lunch: 130s, dinner: same  · Hyperglycemia:   · Hypoglycemia:   · Meals per day: 3, breakfast: eggs, cheese or omlette, or toast with peanut butter, lunch: eggs or eats out salad, dinner: skips, snacks: bedtime yogurt or chips, diet drink,   · Exercise: no  · DM related hospitalizations: no    Smoking: no  Family history of coronary artery disease/stroke: NA    Complications of DM:  · CAD: no  · CVA: yes   · PVD: no  · Amputations: no   · Retinopathy: no; last exam was   · Gastropathy: no  · Nephropathy: no  · Neuropathy: no   Sees podiatrist:    Medications:  · Statin: atorvastatin 40  · ACE-I: benazapril  · ASA: yes    · Diabetes education: ? no    History reviewed.  No pertinent past medical history. Past Surgical History:   Procedure Laterality Date    HX BACK SURGERY      HX CATARACT REMOVAL Bilateral       Family History   Problem Relation Age of Onset    Immune Disorder Sister      Social History     Tobacco Use    Smoking status: Never    Smokeless tobacco: Never   Substance Use Topics    Alcohol use: Not Currently      Prior to Admission medications    Medication Sig Start Date End Date Taking? Authorizing Provider   Droplet Pen Needle 31 gauge x 3/16\" ndle  6/9/22  Yes Provider, Historical   clopidogreL (PLAVIX) 75 mg tab  6/10/22  Yes Provider, Historical   cyanocobalamin (VITAMIN B-12) 1,000 mcg sublingual tablet  4/28/22  Yes Provider, Historical   ibandronate (BONIVA) 150 mg tablet  7/6/22  Yes Provider, Historical   SITagliptin-metFORMIN (Janumet) 50-1,000 mg per tablet Take 1 Tablet by mouth two (2) times daily (with meals) for 90 days. 7/28/22 10/26/22 Yes Miguel Britt MD   Insulin Needles, Disposable, 31 gauge x 5/16\" ndle Once a day 7/28/22  Yes Miguel Britt MD   insulin glargine (Lantus Solostar U-100 Insulin) 100 unit/mL (3 mL) inpn Inject 14 units at bedtime, 90 days 7/28/22  Yes Miguel Britt MD   atorvastatin (LIPITOR) 40 mg tablet Take 1 Tablet by mouth in the morning for 90 days. 7/28/22 10/26/22 Yes Miguel Britt MD   amLODIPine-benazepril (LOTREL) 10-40 mg per capsule Take 1 Capsule by mouth daily. Yes Provider, Historical   atenoloL-chlorthalidone (TENORETIC) 50-25 mg per tablet Take 1 Tablet by mouth daily. Yes Provider, Historical   ergocalciferol (ERGOCALCIFEROL) 1,250 mcg (50,000 unit) capsule Take 50,000 Units by mouth every seven (7) days. Yes Provider, Historical   aspirin 81 mg chewable tablet Take 1 Tablet by mouth daily.  12/10/21  Yes Mary Flower MD        No Known Allergies    Review of Systems:  ROS    A comprehensive review of systems was preformed and it is negative except mentioned in HPI    Objective:     Vitals: 07/28/22 0956   BP: 132/60   Pulse: 75   Temp: 98.6 °F (37 °C)   TempSrc: Temporal   SpO2: 99%   Weight: 151 lb (68.5 kg)   Height: 5' 3\" (1.6 m)        Physical Exam:    Physical Exam  Vitals and nursing note reviewed. Constitutional:       Appearance: Normal appearance. HENT:      Head: Normocephalic and atraumatic. Cardiovascular:      Rate and Rhythm: Normal rate and regular rhythm. Pulmonary:      Effort: Pulmonary effort is normal.      Breath sounds: Normal breath sounds. Neurological:      Mental Status: She is alert. 3-:  diabetic foot exam:  Bilateral diabetic foot exam was performed today. Dorsalis pedis pulses 1+ bilaterally. Monofilament sensation normal bilaterally. No ulcers or skin breakdown. Labs and Imaging:    Last 3 Recorded Weights in this Encounter    07/28/22 0956   Weight: 151 lb (68.5 kg)        No results found for: HBA1C, YYL7ECVV, PML0JPKS, DNI9YPEI     Assessment:     Patient Active Problem List   Diagnosis Code    TIA (transient ischemic attack) G45.9    Slurred speech R47.81    Diabetes mellitus ([facility] [address] E11.9    Benign essential HTN I10    Mixed hyperlipidemia E78.2    History of CVA (cerebrovascular accident) Z86.73           Plan:     Type 2 diabetes mellitus, uncontrolled:  Hemoglobin A1c was 13.3% on 9-, 9% on 1-, 7.4% on 3-, 7.1% on 4- checked by PCP, 6.4% today. Fingerstick blood glucose is 147 mg/dL in my office today. Up to date with diabetes related annual labs: Yes, 4- except urine microalbumin/creatinine ratio  Up to date with diabetic eye exam: May 2022  Plan:  Discussed with patient importance of controlling diabetes to prevent endorgan damage. Encourage patient to continue to work on diet. Glucose control is looking adequate. Continue Lantus 14 units at bedtime. Continue Janumet 50- 1000 mg twice a day. Check blood glucose twice a day every day and follow-up with PCP in 3 months.     Essential hypertension:  Blood pressure slightly elevated today, which may be acceptable given patient's age. No microalbuminuria, last checked in September 2021. Mixed hyperlipidemia:  1-: Total cholesterol 145, triglycerides 198, LDL 66, 4-: Total cholesterol 149, triglycerides 150, LDL 73. Continue atorvastatin 40 mg daily at bedtime. Sending refill. History of CVA:  On aspirin and statin. Orders Placed This Encounter    AMB POC GLUCOSE BLOOD, BY GLUCOSE MONITORING DEVICE    AMB POC HEMOGLOBIN A1C    SITagliptin-metFORMIN (Janumet) 50-1,000 mg per tablet     Sig: Take 1 Tablet by mouth two (2) times daily (with meals) for 90 days. Dispense:  180 Tablet     Refill:  3    DISCONTD: atorvastatin (LIPITOR) 40 mg tablet     Sig: Take 1 Tablet by mouth in the morning for 90 days. Dispense:  90 Tablet     Refill:  3     DC pravastatin    Insulin Needles, Disposable, 31 gauge x 5/16\" ndle     Sig: Once a day     Dispense:  100 Each     Refill:  3    insulin glargine (Lantus Solostar U-100 Insulin) 100 unit/mL (3 mL) inpn     Sig: Inject 14 units at bedtime, 90 days     Dispense:  18 mL     Refill:  3     DC Novolog    atorvastatin (LIPITOR) 40 mg tablet     Sig: Take 1 Tablet by mouth in the morning for 90 days.      Dispense:  90 Tablet     Refill:  3     DC simvastatin        Signed By: Joseph Pardo MD     July 28, 2022      Return to clinic

## 2022-08-22 LAB
CREATININE, EXTERNAL: 0.89
HBA1C MFR BLD HPLC: 6.6 %
LDL-C, EXTERNAL: 68
TOTAL CHOLESTEROL, NCHOLT: 146

## 2022-11-28 RX ORDER — ATENOLOL AND CHLORTHALIDONE TABLET 50; 25 MG/1; MG/1
TABLET ORAL
Qty: 90 TABLET | Refills: 1 | Status: SHIPPED | OUTPATIENT
Start: 2022-11-28

## 2022-12-17 PROBLEM — M81.0 OSTEOPOROSIS: Status: ACTIVE | Noted: 2022-12-17

## 2022-12-17 PROBLEM — Z86.010 HISTORY OF COLON POLYPS: Status: ACTIVE | Noted: 2022-12-17

## 2022-12-17 PROBLEM — D75.89 MACROCYTOSIS WITHOUT ANEMIA: Status: ACTIVE | Noted: 2022-12-17

## 2022-12-17 PROBLEM — E55.9 VITAMIN D DEFICIENCY: Status: ACTIVE | Noted: 2022-12-17

## 2022-12-17 RX ORDER — SITAGLIPTIN AND METFORMIN HYDROCHLORIDE 50; 1000 MG/1; MG/1
1 TABLET, FILM COATED ORAL 2 TIMES DAILY
COMMUNITY
Start: 2022-11-29

## 2023-03-03 ENCOUNTER — APPOINTMENT (OUTPATIENT)
Dept: GENERAL RADIOLOGY | Age: 78
DRG: 065 | End: 2023-03-03
Attending: EMERGENCY MEDICINE
Payer: MEDICARE

## 2023-03-03 ENCOUNTER — APPOINTMENT (OUTPATIENT)
Dept: CT IMAGING | Age: 78
DRG: 065 | End: 2023-03-03
Attending: EMERGENCY MEDICINE
Payer: MEDICARE

## 2023-03-03 ENCOUNTER — HOSPITAL ENCOUNTER (INPATIENT)
Age: 78
LOS: 6 days | Discharge: NURSING, REHAB OR LONG-TERM CARE | DRG: 065 | End: 2023-03-09
Attending: EMERGENCY MEDICINE | Admitting: INTERNAL MEDICINE
Payer: MEDICARE

## 2023-03-03 ENCOUNTER — APPOINTMENT (OUTPATIENT)
Dept: MRI IMAGING | Age: 78
DRG: 065 | End: 2023-03-03
Attending: NURSE PRACTITIONER
Payer: MEDICARE

## 2023-03-03 DIAGNOSIS — I63.9 CEREBROVASCULAR ACCIDENT (CVA), UNSPECIFIED MECHANISM (HCC): Primary | ICD-10-CM

## 2023-03-03 PROBLEM — R53.1 ACUTE RIGHT-SIDED WEAKNESS: Status: ACTIVE | Noted: 2023-03-03

## 2023-03-03 LAB
ALBUMIN SERPL-MCNC: 3.7 G/DL (ref 3.5–5)
ALBUMIN/GLOB SERPL: 1.2 (ref 1.1–2.2)
ALP SERPL-CCNC: 77 U/L (ref 45–117)
ALT SERPL-CCNC: 13 U/L (ref 12–78)
ANION GAP SERPL CALC-SCNC: 5 MMOL/L (ref 5–15)
AST SERPL W P-5'-P-CCNC: 6 U/L (ref 15–37)
ATRIAL RATE: 89 BPM
BASOPHILS # BLD: 0 K/UL (ref 0–0.1)
BASOPHILS NFR BLD: 0 % (ref 0–1)
BILIRUB SERPL-MCNC: 0.4 MG/DL (ref 0.2–1)
BUN SERPL-MCNC: 26 MG/DL (ref 6–20)
BUN/CREAT SERPL: 25 (ref 12–20)
CA-I BLD-MCNC: 9.2 MG/DL (ref 8.5–10.1)
CALCULATED P AXIS, ECG09: 6 DEGREES
CALCULATED R AXIS, ECG10: -20 DEGREES
CALCULATED T AXIS, ECG11: 12 DEGREES
CHLORIDE SERPL-SCNC: 102 MMOL/L (ref 97–108)
CO2 SERPL-SCNC: 25 MMOL/L (ref 21–32)
CREAT SERPL-MCNC: 1.06 MG/DL (ref 0.55–1.02)
DIAGNOSIS, 93000: NORMAL
DIFFERENTIAL METHOD BLD: ABNORMAL
EOSINOPHIL # BLD: 0.3 K/UL (ref 0–0.4)
EOSINOPHIL NFR BLD: 4 % (ref 0–7)
ERYTHROCYTE [DISTWIDTH] IN BLOOD BY AUTOMATED COUNT: 13 % (ref 11.5–14.5)
GLOBULIN SER CALC-MCNC: 3.2 G/DL (ref 2–4)
GLUCOSE BLD STRIP.AUTO-MCNC: 198 MG/DL (ref 65–100)
GLUCOSE BLD STRIP.AUTO-MCNC: 263 MG/DL (ref 65–100)
GLUCOSE SERPL-MCNC: 276 MG/DL (ref 65–100)
HCT VFR BLD AUTO: 36.4 % (ref 35–47)
HGB BLD-MCNC: 12.1 G/DL (ref 11.5–16)
IMM GRANULOCYTES # BLD AUTO: 0 K/UL (ref 0–0.04)
IMM GRANULOCYTES NFR BLD AUTO: 0 % (ref 0–0.5)
INR PPP: 1 (ref 0.9–1.1)
LYMPHOCYTES # BLD: 1 K/UL (ref 0.8–3.5)
LYMPHOCYTES NFR BLD: 13 % (ref 12–49)
MCH RBC QN AUTO: 32.5 PG (ref 26–34)
MCHC RBC AUTO-ENTMCNC: 33.2 G/DL (ref 30–36.5)
MCV RBC AUTO: 97.8 FL (ref 80–99)
MONOCYTES # BLD: 0.5 K/UL (ref 0–1)
MONOCYTES NFR BLD: 7 % (ref 5–13)
NEUTS SEG # BLD: 5.7 K/UL (ref 1.8–8)
NEUTS SEG NFR BLD: 76 % (ref 32–75)
NRBC # BLD: 0 K/UL (ref 0–0.01)
NRBC BLD-RTO: 0 PER 100 WBC
P-R INTERVAL, ECG05: 132 MS
PERFORMED BY, TECHID: ABNORMAL
PERFORMED BY, TECHID: ABNORMAL
PLATELET # BLD AUTO: 356 K/UL (ref 150–400)
PMV BLD AUTO: 9.5 FL (ref 8.9–12.9)
POTASSIUM SERPL-SCNC: 4.6 MMOL/L (ref 3.5–5.1)
PROT SERPL-MCNC: 6.9 G/DL (ref 6.4–8.2)
PROTHROMBIN TIME: 13.1 SEC (ref 11.9–14.6)
Q-T INTERVAL, ECG07: 374 MS
QRS DURATION, ECG06: 86 MS
QTC CALCULATION (BEZET), ECG08: 455 MS
RBC # BLD AUTO: 3.72 M/UL (ref 3.8–5.2)
SODIUM SERPL-SCNC: 132 MMOL/L (ref 136–145)
TROPONIN I SERPL HS-MCNC: 4 NG/L (ref 0–51)
VENTRICULAR RATE, ECG03: 89 BPM
WBC # BLD AUTO: 7.5 K/UL (ref 3.6–11)

## 2023-03-03 PROCEDURE — 85610 PROTHROMBIN TIME: CPT

## 2023-03-03 PROCEDURE — 74011636637 HC RX REV CODE- 636/637: Performed by: NURSE PRACTITIONER

## 2023-03-03 PROCEDURE — 84484 ASSAY OF TROPONIN QUANT: CPT

## 2023-03-03 PROCEDURE — 74011000636 HC RX REV CODE- 636: Performed by: EMERGENCY MEDICINE

## 2023-03-03 PROCEDURE — 70496 CT ANGIOGRAPHY HEAD: CPT

## 2023-03-03 PROCEDURE — 82962 GLUCOSE BLOOD TEST: CPT

## 2023-03-03 PROCEDURE — 85025 COMPLETE CBC W/AUTO DIFF WBC: CPT

## 2023-03-03 PROCEDURE — 83036 HEMOGLOBIN GLYCOSYLATED A1C: CPT

## 2023-03-03 PROCEDURE — 36415 COLL VENOUS BLD VENIPUNCTURE: CPT

## 2023-03-03 PROCEDURE — 99285 EMERGENCY DEPT VISIT HI MDM: CPT

## 2023-03-03 PROCEDURE — 65270000029 HC RM PRIVATE

## 2023-03-03 PROCEDURE — 4A03X5D MEASUREMENT OF ARTERIAL FLOW, INTRACRANIAL, EXTERNAL APPROACH: ICD-10-PCS | Performed by: INTERNAL MEDICINE

## 2023-03-03 PROCEDURE — 80053 COMPREHEN METABOLIC PANEL: CPT

## 2023-03-03 PROCEDURE — 74011000250 HC RX REV CODE- 250: Performed by: NURSE PRACTITIONER

## 2023-03-03 PROCEDURE — 92610 EVALUATE SWALLOWING FUNCTION: CPT

## 2023-03-03 PROCEDURE — 93005 ELECTROCARDIOGRAM TRACING: CPT

## 2023-03-03 PROCEDURE — 74011250636 HC RX REV CODE- 250/636: Performed by: NURSE PRACTITIONER

## 2023-03-03 PROCEDURE — 74011250637 HC RX REV CODE- 250/637: Performed by: NURSE PRACTITIONER

## 2023-03-03 PROCEDURE — 70450 CT HEAD/BRAIN W/O DYE: CPT

## 2023-03-03 PROCEDURE — 70551 MRI BRAIN STEM W/O DYE: CPT

## 2023-03-03 PROCEDURE — 71045 X-RAY EXAM CHEST 1 VIEW: CPT

## 2023-03-03 PROCEDURE — 92526 ORAL FUNCTION THERAPY: CPT

## 2023-03-03 RX ORDER — ONDANSETRON 2 MG/ML
4 INJECTION INTRAMUSCULAR; INTRAVENOUS
Status: DISCONTINUED | OUTPATIENT
Start: 2023-03-03 | End: 2023-03-09 | Stop reason: HOSPADM

## 2023-03-03 RX ORDER — GUAIFENESIN 100 MG/5ML
81 LIQUID (ML) ORAL DAILY
Status: DISCONTINUED | OUTPATIENT
Start: 2023-03-04 | End: 2023-03-09 | Stop reason: HOSPADM

## 2023-03-03 RX ORDER — SODIUM CHLORIDE 9 MG/ML
75 INJECTION, SOLUTION INTRAVENOUS CONTINUOUS
Status: DISCONTINUED | OUTPATIENT
Start: 2023-03-03 | End: 2023-03-07

## 2023-03-03 RX ORDER — METFORMIN HYDROCHLORIDE 500 MG/1
1000 TABLET ORAL 2 TIMES DAILY WITH MEALS
Status: DISCONTINUED | OUTPATIENT
Start: 2023-03-03 | End: 2023-03-09 | Stop reason: HOSPADM

## 2023-03-03 RX ORDER — CLOPIDOGREL BISULFATE 75 MG/1
75 TABLET ORAL DAILY
Status: DISCONTINUED | OUTPATIENT
Start: 2023-03-04 | End: 2023-03-09 | Stop reason: HOSPADM

## 2023-03-03 RX ORDER — ATORVASTATIN CALCIUM 40 MG/1
80 TABLET, FILM COATED ORAL
Status: DISCONTINUED | OUTPATIENT
Start: 2023-03-03 | End: 2023-03-09 | Stop reason: HOSPADM

## 2023-03-03 RX ORDER — IBUPROFEN 200 MG
4 TABLET ORAL AS NEEDED
Status: DISCONTINUED | OUTPATIENT
Start: 2023-03-03 | End: 2023-03-09 | Stop reason: HOSPADM

## 2023-03-03 RX ORDER — DEXTROSE MONOHYDRATE 100 MG/ML
0-250 INJECTION, SOLUTION INTRAVENOUS AS NEEDED
Status: DISCONTINUED | OUTPATIENT
Start: 2023-03-03 | End: 2023-03-09 | Stop reason: HOSPADM

## 2023-03-03 RX ORDER — ACETAMINOPHEN 325 MG/1
650 TABLET ORAL
Status: DISCONTINUED | OUTPATIENT
Start: 2023-03-03 | End: 2023-03-09 | Stop reason: HOSPADM

## 2023-03-03 RX ORDER — AMLODIPINE BESYLATE 5 MG/1
10 TABLET ORAL DAILY
Status: DISCONTINUED | OUTPATIENT
Start: 2023-03-04 | End: 2023-03-09 | Stop reason: HOSPADM

## 2023-03-03 RX ORDER — POLYETHYLENE GLYCOL 3350 17 G/17G
17 POWDER, FOR SOLUTION ORAL DAILY PRN
Status: DISCONTINUED | OUTPATIENT
Start: 2023-03-03 | End: 2023-03-09 | Stop reason: HOSPADM

## 2023-03-03 RX ORDER — ENOXAPARIN SODIUM 100 MG/ML
40 INJECTION SUBCUTANEOUS EVERY 12 HOURS
Status: DISCONTINUED | OUTPATIENT
Start: 2023-03-04 | End: 2023-03-04

## 2023-03-03 RX ORDER — ENOXAPARIN SODIUM 100 MG/ML
40 INJECTION SUBCUTANEOUS DAILY
Status: DISCONTINUED | OUTPATIENT
Start: 2023-03-04 | End: 2023-03-03

## 2023-03-03 RX ORDER — INSULIN GLARGINE 100 [IU]/ML
14 INJECTION, SOLUTION SUBCUTANEOUS
Status: DISCONTINUED | OUTPATIENT
Start: 2023-03-03 | End: 2023-03-09 | Stop reason: HOSPADM

## 2023-03-03 RX ORDER — ACETAMINOPHEN 650 MG/1
650 SUPPOSITORY RECTAL
Status: DISCONTINUED | OUTPATIENT
Start: 2023-03-03 | End: 2023-03-09 | Stop reason: HOSPADM

## 2023-03-03 RX ORDER — ONDANSETRON 4 MG/1
4 TABLET, ORALLY DISINTEGRATING ORAL
Status: DISCONTINUED | OUTPATIENT
Start: 2023-03-03 | End: 2023-03-09 | Stop reason: HOSPADM

## 2023-03-03 RX ORDER — INSULIN LISPRO 100 [IU]/ML
INJECTION, SOLUTION INTRAVENOUS; SUBCUTANEOUS
Status: DISCONTINUED | OUTPATIENT
Start: 2023-03-03 | End: 2023-03-09 | Stop reason: HOSPADM

## 2023-03-03 RX ORDER — SODIUM CHLORIDE 0.9 % (FLUSH) 0.9 %
5-40 SYRINGE (ML) INJECTION EVERY 8 HOURS
Status: DISCONTINUED | OUTPATIENT
Start: 2023-03-03 | End: 2023-03-09 | Stop reason: HOSPADM

## 2023-03-03 RX ORDER — ALOGLIPTIN 12.5 MG/1
12.5 TABLET, FILM COATED ORAL DAILY
Status: DISCONTINUED | OUTPATIENT
Start: 2023-03-04 | End: 2023-03-09 | Stop reason: HOSPADM

## 2023-03-03 RX ORDER — LANOLIN ALCOHOL/MO/W.PET/CERES
1000 CREAM (GRAM) TOPICAL DAILY
Status: DISCONTINUED | OUTPATIENT
Start: 2023-03-04 | End: 2023-03-09 | Stop reason: HOSPADM

## 2023-03-03 RX ORDER — LISINOPRIL 40 MG/1
40 TABLET ORAL DAILY
Status: DISCONTINUED | OUTPATIENT
Start: 2023-03-04 | End: 2023-03-09 | Stop reason: HOSPADM

## 2023-03-03 RX ORDER — SODIUM CHLORIDE 0.9 % (FLUSH) 0.9 %
5-40 SYRINGE (ML) INJECTION AS NEEDED
Status: DISCONTINUED | OUTPATIENT
Start: 2023-03-03 | End: 2023-03-09 | Stop reason: HOSPADM

## 2023-03-03 RX ADMIN — ATORVASTATIN CALCIUM 80 MG: 40 TABLET, FILM COATED ORAL at 21:38

## 2023-03-03 RX ADMIN — INSULIN LISPRO 4 UNITS: 100 INJECTION, SOLUTION INTRAVENOUS; SUBCUTANEOUS at 16:54

## 2023-03-03 RX ADMIN — IOPAMIDOL 100 ML: 755 INJECTION, SOLUTION INTRAVENOUS at 11:07

## 2023-03-03 RX ADMIN — INSULIN LISPRO 3 UNITS: 100 INJECTION, SOLUTION INTRAVENOUS; SUBCUTANEOUS at 21:38

## 2023-03-03 RX ADMIN — SODIUM CHLORIDE, PRESERVATIVE FREE 10 ML: 5 INJECTION INTRAVENOUS at 21:41

## 2023-03-03 RX ADMIN — SODIUM CHLORIDE 75 ML/HR: 9 INJECTION, SOLUTION INTRAVENOUS at 17:46

## 2023-03-03 RX ADMIN — SODIUM CHLORIDE, PRESERVATIVE FREE 10 ML: 5 INJECTION INTRAVENOUS at 14:45

## 2023-03-03 NOTE — ED NOTES
Per pts family they are suspecting pt has not been complaint with her daily aspirin or plavix, they do not see an aspirn bottle on her nightstand or in the home and the boxes of plavix medication are unopened and they do not see any bottles currently in use

## 2023-03-03 NOTE — PROGRESS NOTES
Pt admitted at 12. Patient able to state her name and birth year. She was able to state she is in the hospital. VS stable. Pt has two working IVs. Pt has right sided facial droop and L face numbness. Pt has very limited use of right arm and has no  strength to right hand. Swallow evaluation completed at bedside. Meds to be crushed with honey thick liquids. Pt is very impulsive and attempting to get OOB. One to one supervision requested. Pt was able to stand pivot with heavy two person assist to commode and GAIT belt. Pt's sister Wiliam Smack at bedside helping with orienting patient.

## 2023-03-03 NOTE — Clinical Note
Status[de-identified] INPATIENT [101]   Type of Bed: Remote Telemetry [29]   Cardiac Monitoring Required?: Yes   Inpatient Hospitalization Certified Necessary for the Following Reasons: 3.  Patient receiving treatment that can only be provided in an inpatient setting (further clarification in H&P documentation)   Admitting Diagnosis: CVA (cerebral vascular accident) [facility]) [[id number]]   Admitting Physician: Lisa Soares [[id number]]   Attending Physician: MARY HARRIS [[id number]]   Estimated Length of Stay: 3-4 Midnights   Discharge Plan[de-identified] Home with Office Follow-up

## 2023-03-03 NOTE — PROGRESS NOTES
SPEECH LANGUAGE PATHOLOGY BEDSIDE SWALLOW EVALUATIONS  Patient: Bethel Trejo  (78 y.o. )  Date: 3/3/2023  Primary Diagnosis: CVA   Precautions:  Aspiration, fall    ASSESSMENT :  Patient is A&Ox2. Sister at bedside. R facial droop w/ mild dysarthria. Pooling of secretions noted R buccal pocket. R sided neglect difficulty getting patient to attend midline and R side. Informally, patient presents w/ mixed aphasia and cognitive deficits. Reduced attention, intermittent 1 step command following w/ mod cues. Patient able to answer basic personal yes/no questions w/ re-engagement to each question. Rec full cog ling eval.  Patient is impulsive with poor safety and deficit awareness she is a high fall risk. Patient presents w/ moderate oral dysphagia and mild pharyngeal dysphagia. Oral phase c/b reduced labial, buccal and lingual strength w/ decreased labial seal w/ anterior and lateral spillage most notable w/ thin and milldy thick. Decreased bolus manipulation and slow A-P transit. Pharyngeal phase c/b mild swallow delay and HLE is wfl upon palpation. Overt s/s of pen/asp observed w/ 100% thin trials and 50% mildly thick trials. Patient at high risk for aspiration. Educated on swallow and feeding strategies. Introduced  attention and speech strategies. Patient will benefit from skilled intervention to address the above impairments. Patients rehabilitation potential is considered to be Fair     PLAN :  Recommendations and Planned Interventions:  Rec full moderately thick diet w/ strict asp/GERD precautions and meds crushed in applesauce. Rec only feed when alert, small bites/sips, clear R buccal pocket, slow rate of intake and remain upright 30 mins after PO. MBS on 3/6/23. Rec cog ling eval.   Frequency/Duration: Patient will be followed by speech-language pathology 5 times a week to address goals.   Discharge Recommendations: Inpatient Rehab     SUBJECTIVE:   Sister reports new onset speech, language, and R sided weakness deficits. Denies hx of dysphagia. OBJECTIVE:   Patient admitted w/ R facial droop, R sided weakness, dysarthria, and language impairments. MRI positive for acute infacrt L frontoparietal.  Past Medical History:   Diagnosis Date    Diabetes ([facility] [address]     Hypercholesterolemia     Hypertension     Stroke ([facility] [address]        CXR Results  (Last 48 hours)                 03/03/23 1133  XR CHEST PORT Final result    Impression:  No acute process identified. Narrative:  Clinical history: CVA   INDICATION:   CVA   COMPARISON: None       FINDINGS:   AP portable upright view of the chest demonstrates a stable  cardiopericardial   silhouette. There is no pleural effusion. .There is no focal consolidation. .There   is no pneumothorax. .                    Diet prior to admission: Regular  Current Diet:  DIET NPO     Cognitive and Communication Status:  Neurologic State: Alert, Confused  Orientation Level: Oriented to place, Oriented to person  Cognition: Impulsive, Decreased attention/concentration, Decreased command following, Poor safety awareness  Perception: Cues to attend to right side of body, Cues to attend right visual field  Perseveration: No perseveration noted  Safety/Judgement: Decreased awareness of environment, Decreased awareness of need for assistance, Decreased awareness of need for safety, Decreased insight into deficits  Swallowing Evaluation:   Oral Assessment:  Oral Assessment  Labial: Right droop  Dentition: Upper & lower dentures  Oral Hygiene: wfl  Lingual: Decreased rate;Decreased strength  Velum: No impairment  Mandible: No impairment  P.O. Trials:  Patient Position: sitting EOB  Vocal quality prior to P.O.: Low volume  Consistency Presented: Thin liquid; Nectar thick liquid;Honey thick liquid; Ice chips;Puree  How Presented: SLP-fed/presented;Cup/sip;Spoon;Straw     Bolus Acceptance: No impairment  Bolus Formation/Control: Impaired  Type of Impairment: Delayed; Anterior; Lip closure;Spillage  Propulsion: Delayed (# of seconds)  Oral Residue: 10-50% of bolus  Initiation of Swallow: Delayed (# of seconds)  Laryngeal Elevation: Functional  Aspiration Signs/Symptoms: Weak cough  Pharyngeal Phase Characteristics: Audible swallow             Oral Phase Severity: Moderate  Pharyngeal Phase Severity : Mild  Voice:                 Vocal Quality: Low volume                               Pain:  Pain Scale 1: Numeric (0 - 10)  Pain Intensity 1: 0         After treatment:   Patient left in no apparent distress in bed, Call bell within reach, Nursing notified, Caregiver / family present, and Bed / chair alarm activated    COMMUNICATION/EDUCATION:   Patient and sister educated regarding diet recs, s/s aspiration, aspiration precautions, and swallow strategies. Sister demonstrated Good understanding as evidenced by engagement and appropriate questions. The patient's plan of care including recommendations, planned interventions, and recommended diet changes were discussed with: Physical therapist, Occupational therapist, Registered nurse, and Case management. Patient/family have participated as able in goal setting and plan of care. Patient/family agree to work toward stated goals and plan of care. Problem: Dysphagia (Adult)  Goal: *Acute Goals and Plan of Care (Insert Text)  Description: Speech Therapy Swallow Goals  Initiated 3/3/2023  -Patient will tolerate full diet with moderately thick liquids without clinical indicators of aspiration given moderate cues within 7 day(s). -Patient will tolerate PO trials without clinical indicators of aspiration given moderate cues within 7 day(s). -Patient will participate in modified barium swallow study within 7 day(s). -Patient will demonstrate understanding of swallow safety precautions and aspiration precautions, diet recs with moderate cues within 7 day(s). -Patient will participate in speech language evaluation. -Patient/caregiver goal: eat safely  Outcome: Initial     Thank you for this referral.  William Koehler M.S., M.Ed., CCC-SLP  Time Calculation: 32 mins

## 2023-03-03 NOTE — ED NOTES
TRANSFER - OUT REPORT:    Verbal report given to Clifford Angulo (name) on Venkat Mcfarland  being transferred to (unit) for routine progression of care       Report consisted of patients Situation, Background, Assessment and   Recommendations(SBAR). Information from the following report(s) SBAR, ED Summary, MAR, and Cardiac Rhythm NSR  was reviewed with the receiving nurse. Lines:   Peripheral IV 03/03/23 Left Antecubital (Active)   Site Assessment Clean, dry, & intact 03/03/23 1052   Phlebitis Assessment 0 03/03/23 1052   Infiltration Assessment 0 03/03/23 1052   Dressing Status Clean, dry, & intact 03/03/23 1052   Dressing Type Transparent;Tape 03/03/23 1052   Hub Color/Line Status Pink 03/03/23 1052       Peripheral IV 03/03/23 Anterior;Proximal;Right Antecubital (Active)        Opportunity for questions and clarification was provided.       Patient transported with:   Monitor  Tech

## 2023-03-03 NOTE — H&P
History and Physical        Patient: Ashly Gaspar MRN: [id number]  SSN: [id number]    YOB: 1945  Age: 78 y.o. Sex: female      Subjective:      Ashly Gaspar is a 78 y.o. female who has a past medical history of type 2 diabetes, hypertension and CVA 2021, with no residual per her daughter Daisy Melendez. Her daughter reports she went to see her this morning around 1015 and the patient's speech was slurred with right side weakness. Her daughter brought her to the ED. She is an ex-smoker and has not smoked in years. On exam she has right sided facial drooping, right upper extremity drift. Right leg weakness, expressive aphasia. CT of head on admission shows chronic ischemia with no changes, no acute findings. MRI shows 1. Multiple acute to subacute infarcts in the left cerebral hemisphere predominantly within the left frontoparietal deep white matter, and in a pattern suggestive of watershed infarction. 2. Minimal chronic microvascular ischemic disease. Chronic infarcts in the bilateral basal ganglia, left larger than right, and left cerebellum. EKG shows Normal Sinus Rhythm. Ms. Juanis Ortega admitted with CVA for further evaluation and treatment. Labs on admission:  WBC 7.5, HgB 12.1, Platelet 571, Sodium 132, Potassium 4.6, Glucose 276, Creatinine 1.06. normal LFT's. Troponin 4. Lipid panel, TSH,and HgBA1C pending. Urinalysis pending. Speech therapy, OT/PT consult. Modified Barium swallow pending. Neurology consult.        Past Medical History:   Diagnosis Date    Diabetes ([facility] [address]     Hypercholesterolemia     Hypertension     Stroke [facility])      Past Surgical History:   Procedure Laterality Date    HX BACK SURGERY      HX CARPAL TUNNEL RELEASE      HX CATARACT REMOVAL Bilateral     HX ORTHOPAEDIC      ankle      Family History   Problem Relation Age of Onset    Hypertension Mother     Heart Surgery Father     Immune Disorder Sister      Social History     Tobacco Use    Smoking status: Never    Smokeless tobacco: Never   Substance Use Topics    Alcohol use: Not Currently      Prior to Admission medications    Medication Sig Start Date End Date Taking? Authorizing Provider   Janumet 50-1,000 mg per tablet Take 1 Tablet by mouth two (2) times a day. 11/29/22  Yes Provider, Historical   insulin glargine (Lantus Solostar U-100 Insulin) 100 unit/mL (3 mL) inpn Inject 14 units at bedtime, 90 days 7/28/22  Yes Miguel Angel Mraie MD   amLODIPine-benazepril (LOTREL) 10-40 mg per capsule Take 1 Capsule by mouth daily. Yes Provider, Historical   atenoloL-chlorthalidone (TENORETIC) 50-25 mg per tablet TAKE 1 TABLET EVERY DAY 11/28/22   Madelaine Romeo MD   clopidogreL (PLAVIX) 75 mg tab  6/10/22   Provider, Historical   cyanocobalamin (VITAMIN B-12) 1,000 mcg sublingual tablet  4/28/22   Provider, Historical   ibandronate (BONIVA) 150 mg tablet  7/6/22   Provider, Historical   ergocalciferol (ERGOCALCIFEROL) 1,250 mcg (50,000 unit) capsule Take 50,000 Units by mouth every seven (7) days. Provider, Historical   aspirin 81 mg chewable tablet Take 1 Tablet by mouth daily. 12/10/21   Agnieszka Dee MD        No Known Allergies    Review of Systems   Constitutional:  Positive for malaise/fatigue. Negative for chills and fever. HENT:  Positive for hearing loss. Eyes:  Negative for blurred vision. Respiratory:  Negative for cough, shortness of breath and wheezing. Cardiovascular:  Negative for chest pain and leg swelling. Gastrointestinal:  Negative for heartburn and nausea. Genitourinary: Negative. Skin:  Negative for itching and rash. Neurological:  Positive for speech change, focal weakness, weakness and headaches.        Objective:     Vitals:    03/03/23 1258 03/03/23 1318 03/03/23 1337 03/03/23 1453   BP: (!) 143/69 (!) 160/78 (!) 156/71 (!) 158/69   Pulse: 85 84 82 97   Resp: 12 15 16 16   Temp:    98.3 °F (36.8 °C)   SpO2: 99% 99% 99% 97%   Weight:       Height: Physical Exam  Constitutional:       Appearance: She is obese. She is ill-appearing. HENT:      Head: Normocephalic and atraumatic. Cardiovascular:      Rate and Rhythm: Normal rate and regular rhythm. Heart sounds: No murmur heard. No gallop. Pulmonary:      Effort: Pulmonary effort is normal.      Breath sounds: Normal breath sounds. No wheezing. Abdominal:      General: Bowel sounds are normal. There is no distension. Palpations: Abdomen is soft. Tenderness: There is no abdominal tenderness. There is no guarding. Musculoskeletal:      Right lower leg: No edema. Left lower leg: No edema. Comments: Generalized weakness   Neurological:      Mental Status: She is alert. Cranial Nerves: Cranial nerve deficit present. Sensory: Sensory deficit present. Motor: Weakness present. Coordination: Coordination abnormal.      Comments: Right arm drift, right side weakness  Slurred speech   Psychiatric:         Behavior: Behavior normal.         Recent Results (from the past 24 hour(s))   CBC WITH AUTOMATED DIFF    Collection Time: 03/03/23 10:59 AM   Result Value Ref Range    WBC 7.5 3.6 - 11.0 K/uL    RBC 3.72 (L) 3.80 - 5.20 M/uL    HGB 12.1 11.5 - 16.0 g/dL    HCT 36.4 35.0 - 47.0 %    MCV 97.8 80.0 - 99.0 FL    MCH 32.5 26.0 - 34.0 PG    MCHC 33.2 30.0 - 36.5 g/dL    RDW 13.0 11.5 - 14.5 %    PLATELET 396 091 - 770 K/uL    MPV 9.5 8.9 - 12.9 FL    NRBC 0.0 0.0  WBC    ABSOLUTE NRBC 0.00 0.00 - 0.01 K/uL    NEUTROPHILS 76 (H) 32 - 75 %    LYMPHOCYTES 13 12 - 49 %    MONOCYTES 7 5 - 13 %    EOSINOPHILS 4 0 - 7 %    BASOPHILS 0 0 - 1 %    IMMATURE GRANULOCYTES 0 0 - 0.5 %    ABS. NEUTROPHILS 5.7 1.8 - 8.0 K/UL    ABS. LYMPHOCYTES 1.0 0.8 - 3.5 K/UL    ABS. MONOCYTES 0.5 0.0 - 1.0 K/UL    ABS. EOSINOPHILS 0.3 0.0 - 0.4 K/UL    ABS. BASOPHILS 0.0 0.0 - 0.1 K/UL    ABS. IMM.  GRANS. 0.0 0.00 - 0.04 K/UL    DF AUTOMATED     METABOLIC PANEL, COMPREHENSIVE Collection Time: 03/03/23 10:59 AM   Result Value Ref Range    Sodium 132 (L) 136 - 145 mmol/L    Potassium 4.6 3.5 - 5.1 mmol/L    Chloride 102 97 - 108 mmol/L    CO2 25 21 - 32 mmol/L    Anion gap 5 5 - 15 mmol/L    Glucose 276 (H) 65 - 100 mg/dL    BUN 26 (H) 6 - 20 mg/dL    Creatinine 1.06 (H) 0.55 - 1.02 mg/dL    BUN/Creatinine ratio 25 (H) 12 - 20      eGFR 55 (L) >60 ml/min/1.73m2    Calcium 9.2 8.5 - 10.1 mg/dL    Bilirubin, total 0.4 0.2 - 1.0 mg/dL    AST (SGOT) 6 (L) 15 - 37 U/L    ALT (SGPT) 13 12 - 78 U/L    Alk.  phosphatase 77 45 - 117 U/L    Protein, total 6.9 6.4 - 8.2 g/dL    Albumin 3.7 3.5 - 5.0 g/dL    Globulin 3.2 2.0 - 4.0 g/dL    A-G Ratio 1.2 1.1 - 2.2     PROTHROMBIN TIME + INR    Collection Time: 03/03/23 10:59 AM   Result Value Ref Range    Prothrombin time 13.1 11.9 - 14.6 sec    INR 1.0 0.9 - 1.1     TROPONIN-HIGH SENSITIVITY    Collection Time: 03/03/23 10:59 AM   Result Value Ref Range    Troponin-High Sensitivity 4 0 - 51 ng/L   EKG, 12 LEAD, INITIAL    Collection Time: 03/03/23 12:12 PM   Result Value Ref Range    Ventricular Rate 89 BPM    Atrial Rate 89 BPM    P-R Interval 132 ms    QRS Duration 86 ms    Q-T Interval 374 ms    QTC Calculation (Bezet) 455 ms    Calculated P Axis 6 degrees    Calculated R Axis -20 degrees    Calculated T Axis 12 degrees    Diagnosis       Normal sinus rhythm  Normal ECG  When compared with ECG of 06-FEB-2015 10:47,  T wave amplitude has decreased in Anterior leads  Confirmed by [facility] MD, Antonette Granados ([id number]) on 3/3/2023 12:40:50 PM     GLUCOSE, POC    Collection Time: 03/03/23  4:27 PM   Result Value Ref Range    Glucose (POC) 263 (H) 65 - 100 mg/dL    Performed by Andrena Lincoln        XR Results (most recent):  Results from Hospital Encounter encounter on 03/03/23    XR CHEST PORT    Narrative  Clinical history: CVA  INDICATION:   CVA  COMPARISON: None    FINDINGS:  AP portable upright view of the chest demonstrates a stable cardiopericardial  silhouette. There is no pleural effusion. .There is no focal consolidation. .There  is no pneumothorax. .    Impression  No acute process identified. CT Results (most recent):  Results from Hospital Encounter encounter on 03/03/23    CTA CODE NEURO HEAD AND NECK W CONT    Narrative  EXAM:  CTA CODE NEURO HEAD AND NECK W CONT    INDICATION:   Code Stroke    COMPARISON:  CT head 3/3/2023, CTA head neck 12/8/2021. CONTRAST:  100 mL of Isovue-370. TECHNIQUE:  Unenhanced  images were obtained to localize the volume for  acquisition. Multislice helical axial CT angiography was performed from the  aortic arch to the top of the head during uneventful rapid bolus intravenous  contrast administration. Coronal and sagittal reformations and 3D post  processing was performed. CT dose reduction was achieved through use of a  standardized protocol tailored for this examination and automatic exposure  control for dose modulation. This study was analyzed by the [address]  algorithm. FINDINGS:    CTA Head:  There is no evidence of large vessel occlusion or flow-limiting stenosis of the  intracranial internal carotid, anterior cerebral, and middle cerebral arteries. Unchanged mild to moderate stenosis of the left M1-M2 junction. Calcification of  the bilateral carotid siphons with no more than mild stenosis. The anterior  communicating artery is patent. There is no evidence of large vessel occlusion or flow-limiting stenosis of the  intracranial vertebral arteries, basilar artery, or posterior cerebral arteries. Unchanged severe stenosis in the right P2 segment (series 401 image 336). Mild  calcification of the the bilateral V4 segments without significant stenosis. The  posterior communicating arteries are not well seen. There is no evidence of aneurysm or vascular malformation. The dural venous  sinuses and deep cerebral venous system are patent.  No evidence of abnormal  enhancement on delayed phase images. Chronic infarcts in the bilateral basal  ganglia, left larger than right, and chronic infarct in the left superior  cerebellum. CTA NECK:  NASCET method was utilized for calculating stenosis. The aortic arch is unremarkable. The common carotid arteries demonstrate no  significant stenosis. Evaluation of the carotid bifurcations is limited by  motion. There is calcific atherosclerosis of the proximal right internal carotid  artery with suspected at least moderate stenosis. Calcific atherosclerosis of  the proximal left internal carotid artery with suspected moderate stenosis. There is a left dominant vertebrobasilar arterial system. The cervical vertebral  arteries are normal in course, size and contour without significant stenosis. Multinodular thyroid gland, largest nodule in the right thyroid lobe measuring  2.4 cm, unchanged. . Visualized lung apices are clear. No acute fracture or  aggressive osseous lesion. Moderate degenerative disc disease in the mid and  lower cervical spine. Impression  CTA Head:  1. No evidence of flow-limiting stenosis or aneurysm. Scattered atherosclerotic  disease as above, including severe stenosis in the right P2 segment, which is  unchanged. CTA Neck:  1. No evidence of flow-limiting stenosis. 2. Evaluation of the carotid bifurcations is limited by motion, though there are  suspected moderate stenoses of the bilateral proximal internal carotid arteries,  right worse than left. MRI Results (most recent):  Results from [place] encounter on 03/03/23    MRI BRAIN WO CONT    Narrative  EXAM: MRI BRAIN WO CONT    INDICATION: CVA, slurred speech    COMPARISON: MRI brain 12/9/2021, CTA head and neck 3/3/2023. CONTRAST: None. TECHNIQUE:  Multiplanar multisequence acquisition without contrast of the brain.     FINDINGS:  Multiple acute to subacute infarcts in the left frontoparietal deep white matter  arranged in a linear pattern, as well as a few small acute to subacute  cortical/subcortical infarcts in the left frontal greater than parietal lobes. The ventricles are normal in size and position. Few scattered periventricular  and deep white matter T2/FLAIR hyperintensities, consistent with minimal chronic  microvascular ischemic disease. Chronic infarct in the left basal ganglia with  associated hemosiderin staining. Small chronic infarcts in the right basal  ganglia and left cerebellum. There is no acute hemorrhage, extra-axial fluid  collection, or mass effect. There is no cerebellar tonsillar herniation. Expected arterial flow-voids are present. The paranasal sinuses, mastoid air cells, and middle ears are clear. The orbital  contents are within normal limits with bilateral lens implants. No significant  osseous or scalp lesions are identified. Facet arthropathy noted in the cervical  spine. Impression  1. Multiple acute to subacute infarcts in the left cerebral hemisphere  predominantly within the left frontoparietal deep white matter, and in a pattern  suggestive of watershed infarction. 2. Minimal chronic microvascular ischemic disease. Chronic infarcts in the  bilateral basal ganglia, left larger than right, and left cerebellum. Active Problems:    CVA (cerebral vascular accident) ([facility] [address] (3/3/2023)      Acute right-sided weakness (3/3/2023)        Assessment/Plan:     CVA/Right side weakness  -MRI 3/3: 1. Multiple acute to subacute infarcts in the left cerebral hemisphere predominantly within the left frontoparietal deep white matter, and in a pattern suggestive of watershed infarction. 2. Minimal chronic microvascular ischemic disease. Chronic infarcts in the bilateral basal ganglia, left larger than right, and left cerebellum.    -EKG shows Normal Sinus Rhythm.   -Neurology input appreciated  -consult speech therapy  -consult PT  -Consutl OT  -EEG pending  -Lipid panel pending  -HgBa1C pending  -TSH pending  -Atorvastatin 80 mg daily  -Plavix 75 mg daily  -Aspirin 80 mg daily  -CT of head on admission shows chronic ischemia with no changes, no acute findings.     Type 2 diabetes  -Lantus 14 units at bedtime on hold NPO  -sliding scale coverage  -hypoglycemic medications on hold NPO    Hypertension  -Norvasc 10 mg daily  -lisinopril 40 mg oral daily  -Monitor    DVT Prophylaxis: Lovenox  Code Status: full code  POA/NOK: Juan Antonio Boyer (daughter)    Total Time spent in direct and indirect care including assessment review of labs and coordination of services and consultations: Greater than 75 minutes      Signed By: Kristin Sawant NP     March 3, 2023

## 2023-03-03 NOTE — ED TRIAGE NOTES
Stroke alert, unknown LKW, approx 1015 spoke to family and was altered.  Family members reports being well last night 9pm.

## 2023-03-03 NOTE — CONSULTS
NEURO CONSULT      REASON FOR ADMISSION:  Acute onset of language difficulty and right-sided weakness      HISTORY:  Ms. Libra Jameson is 78years old with a history of hypertension, diabetes, and other medical problems who is consulted to neurology for an acute onset of difficulties talking as well as right-sided weakness. Patient was brought to the ER. In the ER, patient had an MRI of the brain without contrast that is showing multiple embolic infarcts to the left cerebral hemisphere. She was stabilized and will be sent to the floor. .    ROS: As per above plus more.     General:                     No fever, no chills, no sweats, no generalized weakness, no weight loss/gain,                                       No loss of appetite   Eyes:                           No blurred vision, no eye pain, no loss of vision, no double vision  ENT:                            rhinorrhea, no pharyngitis   Respiratory:               No cough, no sputum production, no SOB, no WADE, no wheezing, no pleuritic pain   Cardiology:                No chest pain, no palpitations, no orthopnea, no PND, no edema, no syncope   Gastrointestinal:       No abdominal pain , no N/V, no diarrhea, no dysphagia, no constipation, no bleeding   Genitourinary:           frequency, no urgency, no dysuria, no hematuria, no incontinence   Muskuloskeletal :      No arthralgia, no myalgia, no back pain  Hematology:              No easy bruising, no nose or gum bleeding, no lymphadenopathy   Dermatological:         No rash, no ulceration, no pruritis, no color change / jaundice  Endocrine:                 hot flashes or polydipsia   Neurological:             No headache, no dizziness, no confusion, no focal weakness, no paresthesia,                                      No Speech difficulties, no memory loss, no gait difficulty  Psychological:          No neelings of anxiety, no depression, no agitation      NEURO EXAM:    Mental status: Awake, nonfluent aphasia. Cranial nerves: Patient has right homonymous hemianopsia. 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 Motor exam: Patient has right dense hemiparesis and right Babinski    Sensory exam: There is mild tactile extinction on the right    Coordination: Patient is not in a position to perform that at this time    Gait and Station: Patient was not ambulated    ASSESSMENT:  Embolic infarcts to the left MCA territory. Other medical problems      PLAN:  Stroke work-up and treatment  MRI of the brain has been done already  Carotid duplex  2D complete cardiac echo with color Dopplers  EEG  Swallowing screen  Modified barium swallow  Lovenox 40 mg subcu every 12 hours  PT/OT consult  Fasting lipid profile  A1c  Depending on results of swallowing screen, patient may be placed back on her Eliquis and Plavix  Patient seen and evaluated and treated in this complex case.   Total amount of time taken 42 minutes        ALLERGIES:    No Known Allergies    MEDS:      Current Facility-Administered Medications:     [START ON 3/4/2023] aspirin chewable tablet 81 mg, 81 mg, Oral, DAILY, Evon Chris NP    [Held by provider] clopidogreL (PLAVIX) tablet 75 mg, 75 mg, Oral, DAILY, Donna Chris NP    .PHARMACY TO SUBSTITUTE PER PROTOCOL (Reordered from: cyanocobalamin (VITAMIN B-12) 1,000 mcg sublingual tablet), , , Per Protocol, Donna Chris NP    insulin glargine (LANTUS) injection 14 Units, 14 Units, SubCUTAneous, QHS, Donna Chris NP    insulin lispro (HUMALOG) injection, , SubCUTAneous, AC&HS, Donna Chris NP    glucose chewable tablet 16 g, 4 Tablet, Oral, PRN, Mesha VYAS NP    glucagon (GLUCAGEN) injection 1 mg, 1 mg, IntraMUSCular, PRN, Mesha VYAS NP    dextrose 10% infusion 0-250 mL, 0-250 mL, IntraVENous, PRN, Mesha VYAS NP    sodium chloride (NS) flush 5-40 mL, 5-40 mL, IntraVENous, Q8H, Donna Chris NP, 10 mL at 03/03/23 1445    sodium chloride (NS) flush 5-40 mL, 5-40 mL, IntraVENous, PRN, Nicole Smith NP    acetaminophen (TYLENOL) tablet 650 mg, 650 mg, Oral, Q6H PRN **OR** acetaminophen (TYLENOL) suppository 650 mg, 650 mg, Rectal, Q6H PRN, Dat Sanders NP    polyethylene glycol (MIRALAX) packet 17 g, 17 g, Oral, DAILY PRN, Donna Sanders NP    ondansetron (ZOFRAN ODT) tablet 4 mg, 4 mg, Oral, Q8H PRN **OR** ondansetron (ZOFRAN) injection 4 mg, 4 mg, IntraVENous, Q6H PRN, Nicole Smith NP    [START ON 3/4/2023] enoxaparin (LOVENOX) injection 40 mg, 40 mg, SubCUTAneous, DAILY, Donna Chris NP    atorvastatin (LIPITOR) tablet 80 mg, 80 mg, Oral, QHS, Donna Chris NP    [START ON 3/4/2023] alogliptin (NESINA) tablet 12.5 mg, 12.5 mg, Oral, DAILY **AND** metFORMIN (GLUCOPHAGE) tablet 1,000 mg, 1,000 mg, Oral, BID WITH MEALS, Dat Sanders NP    [START ON 3/4/2023] amLODIPine (NORVASC) tablet 10 mg, 10 mg, Oral, DAILY **AND** [START ON 3/4/2023] lisinopriL (PRINIVIL, ZESTRIL) tablet 40 mg, 40 mg, Oral, DAILY, Nicole Smith NP    LABS:  Recent Results (from the past 24 hour(s))   CBC WITH AUTOMATED DIFF    Collection Time: 03/03/23 10:59 AM   Result Value Ref Range    WBC 7.5 3.6 - 11.0 K/uL    RBC 3.72 (L) 3.80 - 5.20 M/uL    HGB 12.1 11.5 - 16.0 g/dL    HCT 36.4 35.0 - 47.0 %    MCV 97.8 80.0 - 99.0 FL    MCH 32.5 26.0 - 34.0 PG    MCHC 33.2 30.0 - 36.5 g/dL    RDW 13.0 11.5 - 14.5 %    PLATELET 575 422 - 656 K/uL    MPV 9.5 8.9 - 12.9 FL    NRBC 0.0 0.0  WBC    ABSOLUTE NRBC 0.00 0.00 - 0.01 K/uL    NEUTROPHILS 76 (H) 32 - 75 %    LYMPHOCYTES 13 12 - 49 %    MONOCYTES 7 5 - 13 %    EOSINOPHILS 4 0 - 7 %    BASOPHILS 0 0 - 1 %    IMMATURE GRANULOCYTES 0 0 - 0.5 %    ABS. NEUTROPHILS 5.7 1.8 - 8.0 K/UL    ABS. LYMPHOCYTES 1.0 0.8 - 3.5 K/UL    ABS. MONOCYTES 0.5 0.0 - 1.0 K/UL    ABS. EOSINOPHILS 0.3 0.0 - 0.4 K/UL    ABS. BASOPHILS 0.0 0.0 - 0.1 K/UL    ABS. IMM.  GRANS. 0.0 0.00 - 0.04 K/UL    DF AUTOMATED     METABOLIC PANEL, COMPREHENSIVE    Collection Time: 03/03/23 10:59 AM   Result Value Ref Range    Sodium 132 (L) 136 - 145 mmol/L    Potassium 4.6 3.5 - 5.1 mmol/L    Chloride 102 97 - 108 mmol/L    CO2 25 21 - 32 mmol/L    Anion gap 5 5 - 15 mmol/L    Glucose 276 (H) 65 - 100 mg/dL    BUN 26 (H) 6 - 20 mg/dL    Creatinine 1.06 (H) 0.55 - 1.02 mg/dL    BUN/Creatinine ratio 25 (H) 12 - 20      eGFR 55 (L) >60 ml/min/1.73m2    Calcium 9.2 8.5 - 10.1 mg/dL    Bilirubin, total 0.4 0.2 - 1.0 mg/dL    AST (SGOT) 6 (L) 15 - 37 U/L    ALT (SGPT) 13 12 - 78 U/L    Alk. phosphatase 77 45 - 117 U/L    Protein, total 6.9 6.4 - 8.2 g/dL    Albumin 3.7 3.5 - 5.0 g/dL    Globulin 3.2 2.0 - 4.0 g/dL    A-G Ratio 1.2 1.1 - 2.2     PROTHROMBIN TIME + INR    Collection Time: 03/03/23 10:59 AM   Result Value Ref Range    Prothrombin time 13.1 11.9 - 14.6 sec    INR 1.0 0.9 - 1.1     TROPONIN-HIGH SENSITIVITY    Collection Time: 03/03/23 10:59 AM   Result Value Ref Range    Troponin-High Sensitivity 4 0 - 51 ng/L   EKG, 12 LEAD, INITIAL    Collection Time: 03/03/23 12:12 PM   Result Value Ref Range    Ventricular Rate 89 BPM    Atrial Rate 89 BPM    P-R Interval 132 ms    QRS Duration 86 ms    Q-T Interval 374 ms    QTC Calculation (Bezet) 455 ms    Calculated P Axis 6 degrees    Calculated R Axis -20 degrees    Calculated T Axis 12 degrees    Diagnosis       Normal sinus rhythm  Normal ECG  When compared with ECG of 06-FEB-2015 10:47,  T wave amplitude has decreased in Anterior leads  Confirmed by Justin Zapata MD, Drea Hull ([id number]) on 3/3/2023 12:40:50 PM         Visit Vitals  BP (!) 158/69 (BP 1 Location: Left upper arm)   Pulse 97   Temp 98.3 °F (36.8 °C)   Resp 16   Ht 5' 3\" (1.6 m)   Wt 79.4 kg (175 lb)   SpO2 97%   BMI 31.00 kg/m²       Imaging:  MRI BRAIN WO CONT   Final Result      1.  Multiple acute to subacute infarcts in the left cerebral hemisphere   predominantly within the left frontoparietal deep white matter, and in a pattern   suggestive of watershed infarction. 2. Minimal chronic microvascular ischemic disease. Chronic infarcts in the   bilateral basal ganglia, left larger than right, and left cerebellum. XR CHEST PORT   Final Result   No acute process identified. CTA CODE NEURO HEAD AND NECK W CONT   Final Result   CTA Head:   1. No evidence of flow-limiting stenosis or aneurysm. Scattered atherosclerotic   disease as above, including severe stenosis in the right P2 segment, which is   unchanged. CTA Neck:   1. No evidence of flow-limiting stenosis. 2. Evaluation of the carotid bifurcations is limited by motion, though there are   suspected moderate stenoses of the bilateral proximal internal carotid arteries,   right worse than left. CT CODE NEURO HEAD WO CONTRAST   Final Result   Chronic ischemic changes. No acute findings.

## 2023-03-03 NOTE — PROGRESS NOTES
Reason for Admission:  CVA                     RUR Score:    N/A                 Plan for utilizing home health:  Not at this time        PCP: First and Last name:  Aquilino Reynolds MD     Name of Practice:    Are you a current patient: Yes/No:    Approximate date of last visit: Couple months ago   Can you participate in a virtual visit with your PCP:                     Current Advanced Directive/Advance Care Plan: Prior      Healthcare Decision Maker:   Click here to complete [id number] including selection of the Healthcare Decision Maker Relationship (ie \"Primary\")    Daisy Melendez, sister, [phone]                         Transition of Care Plan:      Met f/f with Pt and her sister. Pt sister confirmed that the information on the face sheet is correct. Pt sister stated that Pt lives with their mother who is 8 years old. No HH, no walker and independent with ADL. Pt sister stated that Pt was driving and doing all her errands, and cooking. Send RX to [facility] on [email or web address]. Transport: TBD    CM dispo: [facility]. Discussed with Pt and her sister about D/C planning. Pt sister stated that she would like for Pt to go to [facility].     Choice letter signed for [facility], will place on Pt chart, will send a referral.

## 2023-03-03 NOTE — ED PROVIDER NOTES
EMERGENCY DEPARTMENT HISTORY AND PHYSICAL EXAM      Date: 3/3/2023  Patient Name: Liberty Laughlin    History of Presenting Illness     Chief Complaint   Patient presents with    Stroke       History Provided By: Patient    HPI: Liberty Laughlin, 78 y.o. female with a past medical history significant No significant past medical history presents to the ED with chief complaint of Stroke  . 13year-old female reported to be on Plavix. Reported to be noncompliant for the family. Patient was found by family altered this morning last known well was yesterday at some point. No reported falls or trauma. Presents as a level 2 stroke alert to triage via private car. There are no other complaints, changes, or physical findings at this time. PCP: Geovanna Anne MD    Current Facility-Administered Medications   Medication Dose Route Frequency Provider Last Rate Last Admin    . PHARMACY TO SUBSTITUTE PER PROTOCOL (Reordered from: amLODIPine-benazepril (LOTREL) 10-40 mg per capsule)    Per Protocol Doe Butcher NP        [START ON 3/4/2023] aspirin chewable tablet 81 mg  81 mg Oral DAILY Doe Butcher NP        [Held by provider] clopidogreL (PLAVIX) tablet 75 mg  75 mg Oral DAILY January Chris NP        .PHARMACY TO SUBSTITUTE PER PROTOCOL (Reordered from: cyanocobalamin (VITAMIN B-12) 1,000 mcg sublingual tablet)    Per Protocol Doe Butcher NP        .PHARMACY TO SUBSTITUTE PER PROTOCOL (Reordered from: insulin glargine (Lantus Solostar U-100 Insulin) 100 unit/mL (3 mL) inpn)    Per Protocol Doe Butcher NP        .PHARMACY TO SUBSTITUTE PER PROTOCOL (Reordered from: Janumet 50-1,000 mg per tablet)    Per Protocol Doe Butcher NP        insulin lispro (HUMALOG) injection   SubCUTAneous AC&HS Doe Butcher NP        glucose chewable tablet 16 g  4 Tablet Oral PRN Doe Butcher NP        glucagon (GLUCAGEN) injection 1 mg  1 mg IntraMUSCular PRN [facility], NP        dextrose 10% infusion 0-250 mL  0-250 mL IntraVENous PRN [facility], NP        sodium chloride (NS) flush 5-40 mL  5-40 mL IntraVENous Q8H Dolly Chris, NP        sodium chloride (NS) flush 5-40 mL  5-40 mL IntraVENous PRN [facility], NP        acetaminophen (TYLENOL) tablet 650 mg  650 mg Oral Q6H PRN [facility], NP        Or    acetaminophen (TYLENOL) suppository 650 mg  650 mg Rectal Q6H PRN [facility], NP        polyethylene glycol (MIRALAX) packet 17 g  17 g Oral DAILY PRN [facility], NP        ondansetron (ZOFRAN ODT) tablet 4 mg  4 mg Oral Q8H PRN [facility], NP        Or    ondansetron [facility]) injection 4 mg  4 mg IntraVENous Q6H PRN [facility], NP        [START ON 3/4/2023] enoxaparin (LOVENOX) injection 40 mg  40 mg SubCUTAneous DAILY [facility], NP        atorvastatin (LIPITOR) tablet 80 mg  80 mg Oral QHS [facility], NP           Past History     Past Medical History:  Past Medical History:   Diagnosis Date    Diabetes ([facility] [address]     Hypercholesterolemia     Hypertension     Stroke [facility])        Past Surgical History:  Past Surgical History:   Procedure Laterality Date    HX BACK SURGERY      HX CARPAL TUNNEL RELEASE      HX CATARACT REMOVAL Bilateral     HX ORTHOPAEDIC      ankle       Family History:  Family History   Problem Relation Age of Onset    Hypertension Mother     Heart Surgery Father     Immune Disorder Sister        Social History:  Social History     Tobacco Use    Smoking status: Never    Smokeless tobacco: Never   Vaping Use    Vaping Use: Never used   Substance Use Topics    Alcohol use: Not Currently    Drug use: Never       Allergies:  No Known Allergies      Review of Systems   Review of Systems   Unable to perform ROS: Acuity of condition        Positives and Pertinent negatives as per HPI.   Physical Exam   Patient Vitals for the past 24 hrs:   Temp Pulse Resp BP SpO2   03/03/23 1337 -- 82 16 (!) 156/71 99 %   03/03/23 1318 -- 84 15 (!) 160/78 99 %   03/03/23 1258 -- 85 12 (!) 143/69 99 %   03/03/23 1248 -- 86 14 (!) 140/89 99 %   03/03/23 1228 -- 81 16 (!) 154/70 98 %   03/03/23 1218 -- 81 16 (!) 181/80 98 %   03/03/23 1049 98.2 °F (36.8 °C) 85 18 (!) 161/72 98 %         Physical Exam  Vitals and nursing note reviewed. Exam conducted with a chaperone present. Constitutional:       Appearance: Normal appearance. She is normal weight. HENT:      Head: Normocephalic and atraumatic. Nose: Nose normal.      Mouth/Throat:      Mouth: Mucous membranes are moist.      Pharynx: Oropharynx is clear. Eyes:      Extraocular Movements: Extraocular movements intact. Conjunctiva/sclera: Conjunctivae normal.      Pupils: Pupils are equal, round, and reactive to light. Cardiovascular:      Rate and Rhythm: Normal rate and regular rhythm. Pulses: Normal pulses. Heart sounds: Normal heart sounds. Pulmonary:      Effort: Pulmonary effort is normal. No respiratory distress. Breath sounds: Normal breath sounds. Abdominal:      General: Abdomen is flat. Bowel sounds are normal. There is no distension. Palpations: Abdomen is soft. Tenderness: There is no abdominal tenderness. There is no guarding. Musculoskeletal:         General: No swelling, tenderness, deformity or signs of injury. Normal range of motion. Cervical back: Normal range of motion and neck supple. Right lower leg: No edema. Left lower leg: No edema. Skin:     General: Skin is warm and dry. Capillary Refill: Capillary refill takes less than 2 seconds. Findings: No lesion or rash. Neurological:      General: No focal deficit present. Mental Status: She is alert and oriented to person, place, and time. Mental status is at baseline. Cranial Nerves: No cranial nerve deficit. Comments: Isde weak, not foll commands, neglect?    Psychiatric: Mood and Affect: Mood normal.         Behavior: Behavior normal.         Thought Content: Thought content normal.         Judgment: Judgment normal.       Diagnostic Study Results     LABS:   Recent Results (from the past 12 hour(s))   CBC WITH AUTOMATED DIFF    Collection Time: 03/03/23 10:59 AM   Result Value Ref Range    WBC 7.5 3.6 - 11.0 K/uL    RBC 3.72 (L) 3.80 - 5.20 M/uL    HGB 12.1 11.5 - 16.0 g/dL    HCT 36.4 35.0 - 47.0 %    MCV 97.8 80.0 - 99.0 FL    MCH 32.5 26.0 - 34.0 PG    MCHC 33.2 30.0 - 36.5 g/dL    RDW 13.0 11.5 - 14.5 %    PLATELET 454 268 - 959 K/uL    MPV 9.5 8.9 - 12.9 FL    NRBC 0.0 0.0  WBC    ABSOLUTE NRBC 0.00 0.00 - 0.01 K/uL    NEUTROPHILS 76 (H) 32 - 75 %    LYMPHOCYTES 13 12 - 49 %    MONOCYTES 7 5 - 13 %    EOSINOPHILS 4 0 - 7 %    BASOPHILS 0 0 - 1 %    IMMATURE GRANULOCYTES 0 0 - 0.5 %    ABS. NEUTROPHILS 5.7 1.8 - 8.0 K/UL    ABS. LYMPHOCYTES 1.0 0.8 - 3.5 K/UL    ABS. MONOCYTES 0.5 0.0 - 1.0 K/UL    ABS. EOSINOPHILS 0.3 0.0 - 0.4 K/UL    ABS. BASOPHILS 0.0 0.0 - 0.1 K/UL    ABS. IMM. GRANS. 0.0 0.00 - 0.04 K/UL    DF AUTOMATED     METABOLIC PANEL, COMPREHENSIVE    Collection Time: 03/03/23 10:59 AM   Result Value Ref Range    Sodium 132 (L) 136 - 145 mmol/L    Potassium 4.6 3.5 - 5.1 mmol/L    Chloride 102 97 - 108 mmol/L    CO2 25 21 - 32 mmol/L    Anion gap 5 5 - 15 mmol/L    Glucose 276 (H) 65 - 100 mg/dL    BUN 26 (H) 6 - 20 mg/dL    Creatinine 1.06 (H) 0.55 - 1.02 mg/dL    BUN/Creatinine ratio 25 (H) 12 - 20      eGFR 55 (L) >60 ml/min/1.73m2    Calcium 9.2 8.5 - 10.1 mg/dL    Bilirubin, total 0.4 0.2 - 1.0 mg/dL    AST (SGOT) 6 (L) 15 - 37 U/L    ALT (SGPT) 13 12 - 78 U/L    Alk.  phosphatase 77 45 - 117 U/L    Protein, total 6.9 6.4 - 8.2 g/dL    Albumin 3.7 3.5 - 5.0 g/dL    Globulin 3.2 2.0 - 4.0 g/dL    A-G Ratio 1.2 1.1 - 2.2     PROTHROMBIN TIME + INR    Collection Time: 03/03/23 10:59 AM   Result Value Ref Range    Prothrombin time 13.1 11.9 - 14.6 sec    INR 1.0 0.9 - 1.1     TROPONIN-HIGH SENSITIVITY    Collection Time: 03/03/23 10:59 AM   Result Value Ref Range    Troponin-High Sensitivity 4 0 - 51 ng/L   EKG, 12 LEAD, INITIAL    Collection Time: 03/03/23 12:12 PM   Result Value Ref Range    Ventricular Rate 89 BPM    Atrial Rate 89 BPM    P-R Interval 132 ms    QRS Duration 86 ms    Q-T Interval 374 ms    QTC Calculation (Bezet) 455 ms    Calculated P Axis 6 degrees    Calculated R Axis -20 degrees    Calculated T Axis 12 degrees    Diagnosis       Normal sinus rhythm  Normal ECG  When compared with ECG of 06-FEB-2015 10:47,  T wave amplitude has decreased in Anterior leads  Confirmed by Genia Vargas MD, Krishan Alberts ([id number]) on 3/3/2023 12:40:50 PM          EKG: EKG interpreted independently by ER physician. RADIOLOGY:  Non-plain film images such as CT, Ultrasound and MRI are read by the radiologist. Plain radiographic images are visualized and preliminarily interpreted by the ED Provider with the below findings:     Chest x-ray reviewed independently by ER physician. No evidence of pneumonia, pleural effusion, rib fracture or pneumothorax. No widened mediastinum. Negative acute. I do agree with radiology interpretation. Interpretation per the Radiologist below, if available at the time of this note:     XR CHEST PORT    Result Date: 3/3/2023  Clinical history: CVA INDICATION:   CVA COMPARISON: None FINDINGS: AP portable upright view of the chest demonstrates a stable  cardiopericardial silhouette. There is no pleural effusion. .There is no focal consolidation. .There is no pneumothorax. .     No acute process identified. CTA CODE NEURO HEAD AND NECK W CONT    Result Date: 3/3/2023  EXAM:  CTA CODE NEURO HEAD AND NECK W CONT INDICATION:   Code Stroke COMPARISON:  CT head 3/3/2023, CTA head neck 12/8/2021. CONTRAST:  100 mL of Isovue-370. TECHNIQUE:  Unenhanced  images were obtained to localize the volume for acquisition.   Multislice helical axial CT angiography was performed from the aortic arch to the top of the head during uneventful rapid bolus intravenous contrast administration. Coronal and sagittal reformations and 3D post processing was performed. CT dose reduction was achieved through use of a standardized protocol tailored for this examination and automatic exposure control for dose modulation. This study was analyzed by the [address]  algorithm. FINDINGS: CTA Head: There is no evidence of large vessel occlusion or flow-limiting stenosis of the intracranial internal carotid, anterior cerebral, and middle cerebral arteries. Unchanged mild to moderate stenosis of the left M1-M2 junction. Calcification of the bilateral carotid siphons with no more than mild stenosis. The anterior communicating artery is patent. There is no evidence of large vessel occlusion or flow-limiting stenosis of the intracranial vertebral arteries, basilar artery, or posterior cerebral arteries. Unchanged severe stenosis in the right P2 segment (series 401 image 336). Mild calcification of the the bilateral V4 segments without significant stenosis. The posterior communicating arteries are not well seen. There is no evidence of aneurysm or vascular malformation. The dural venous sinuses and deep cerebral venous system are patent. No evidence of abnormal enhancement on delayed phase images. Chronic infarcts in the bilateral basal ganglia, left larger than right, and chronic infarct in the left superior cerebellum. CTA NECK: NASCET method was utilized for calculating stenosis. The aortic arch is unremarkable. The common carotid arteries demonstrate no significant stenosis. Evaluation of the carotid bifurcations is limited by motion. There is calcific atherosclerosis of the proximal right internal carotid artery with suspected at least moderate stenosis. Calcific atherosclerosis of the proximal left internal carotid artery with suspected moderate stenosis.  There is a left dominant vertebrobasilar arterial system. The cervical vertebral arteries are normal in course, size and contour without significant stenosis. Multinodular thyroid gland, largest nodule in the right thyroid lobe measuring 2.4 cm, unchanged. . Visualized lung apices are clear. No acute fracture or aggressive osseous lesion. Moderate degenerative disc disease in the mid and lower cervical spine. CTA Head: 1. No evidence of flow-limiting stenosis or aneurysm. Scattered atherosclerotic disease as above, including severe stenosis in the right P2 segment, which is unchanged. CTA Neck: 1. No evidence of flow-limiting stenosis. 2. Evaluation of the carotid bifurcations is limited by motion, though there are suspected moderate stenoses of the bilateral proximal internal carotid arteries, right worse than left. CT CODE NEURO HEAD WO CONTRAST    Result Date: 3/3/2023  INDICATION: Code Stroke COMPARISON: MRI 12/9/2021, CT exams 12/8/2021 EXAM: Unenhanced axial CT imaging of the head is obtained with coronal and sagittal reformations. CT dose reduction was achieved through use of a standardized protocol tailored for this examination and automatic exposure control for dose modulation. FINDINGS: There is ex vacuo enlargement of the frontal horn of left lateral ventricle again demonstrated. Remote infarction of the left caudate nucleus is again shown. Remote lacunar infarcts are again noted in the head of the right caudate nucleus. Remote infarct of the superior left cerebellar hemisphere is shown. There is no unenhanced CT imaging evidence of acute infarction. No intracranial hemorrhage is shown. No mass effect is demonstrated. No extra-axial collection is shown. The partly visualized orbits and paranasal sinuses appear normal. No osseous abnormality is demonstrated. Chronic ischemic changes. No acute findings.      CT Results  (Last 48 hours)                 03/03/23 1107  CTA CODE NEURO HEAD AND NECK W CONT Final result Impression:  CTA Head:   1. No evidence of flow-limiting stenosis or aneurysm. Scattered atherosclerotic   disease as above, including severe stenosis in the right P2 segment, which is   unchanged. CTA Neck:   1. No evidence of flow-limiting stenosis. 2. Evaluation of the carotid bifurcations is limited by motion, though there are   suspected moderate stenoses of the bilateral proximal internal carotid arteries,   right worse than left. Narrative:  EXAM:  CTA CODE NEURO HEAD AND NECK W CONT       INDICATION:   Code Stroke       COMPARISON:  CT head 3/3/2023, CTA head neck 12/8/2021. CONTRAST:  100 mL of Isovue-370. TECHNIQUE:  Unenhanced  images were obtained to localize the volume for   acquisition. Multislice helical axial CT angiography was performed from the   aortic arch to the top of the head during uneventful rapid bolus intravenous   contrast administration. Coronal and sagittal reformations and 3D post   processing was performed. CT dose reduction was achieved through use of a   standardized protocol tailored for this examination and automatic exposure   control for dose modulation. This study was analyzed by the [address]  algorithm. FINDINGS:       CTA Head:   There is no evidence of large vessel occlusion or flow-limiting stenosis of the   intracranial internal carotid, anterior cerebral, and middle cerebral arteries. Unchanged mild to moderate stenosis of the left M1-M2 junction. Calcification of   the bilateral carotid siphons with no more than mild stenosis. The anterior   communicating artery is patent. There is no evidence of large vessel occlusion or flow-limiting stenosis of the   intracranial vertebral arteries, basilar artery, or posterior cerebral arteries. Unchanged severe stenosis in the right P2 segment (series 401 image 336). Mild   calcification of the the bilateral V4 segments without significant stenosis.  The   posterior communicating arteries are not well seen. There is no evidence of aneurysm or vascular malformation. The dural venous   sinuses and deep cerebral venous system are patent. No evidence of abnormal   enhancement on delayed phase images. Chronic infarcts in the bilateral basal   ganglia, left larger than right, and chronic infarct in the left superior   cerebellum. CTA NECK:   NASCET method was utilized for calculating stenosis. The aortic arch is unremarkable. The common carotid arteries demonstrate no   significant stenosis. Evaluation of the carotid bifurcations is limited by   motion. There is calcific atherosclerosis of the proximal right internal carotid   artery with suspected at least moderate stenosis. Calcific atherosclerosis of   the proximal left internal carotid artery with suspected moderate stenosis. There is a left dominant vertebrobasilar arterial system. The cervical vertebral   arteries are normal in course, size and contour without significant stenosis. Multinodular thyroid gland, largest nodule in the right thyroid lobe measuring   2.4 cm, unchanged. . Visualized lung apices are clear. No acute fracture or   aggressive osseous lesion. Moderate degenerative disc disease in the mid and   lower cervical spine. 03/03/23 1106  CT CODE NEURO HEAD WO CONTRAST Final result    Impression:  Chronic ischemic changes. No acute findings. Narrative:  INDICATION: Code Stroke       COMPARISON: MRI 12/9/2021, CT exams 12/8/2021       EXAM: Unenhanced axial CT imaging of the head is obtained with coronal and   sagittal reformations. CT dose reduction was achieved through use of a   standardized protocol tailored for this examination and automatic exposure   control for dose modulation. FINDINGS: There is ex vacuo enlargement of the frontal horn of left lateral   ventricle again demonstrated. Remote infarction of the left caudate nucleus is   again shown. Remote lacunar infarcts are again noted in the head of the right   caudate nucleus. Remote infarct of the superior left cerebellar hemisphere is   shown. There is no unenhanced CT imaging evidence of acute infarction. No   intracranial hemorrhage is shown. No mass effect is demonstrated. No extra-axial   collection is shown. The partly visualized orbits and paranasal sinuses appear   normal. No osseous abnormality is demonstrated. CXR Results  (Last 48 hours)                 03/03/23 1133  XR CHEST PORT Final result    Impression:  No acute process identified. Narrative:  Clinical history: CVA   INDICATION:   CVA   COMPARISON: None       FINDINGS:   AP portable upright view of the chest demonstrates a stable  cardiopericardial   silhouette. There is no pleural effusion. .There is no focal consolidation. .There   is no pneumothorax. .                    Medical Decision Making and ED Course       CC/HPI Summary, DDx, ED Course, and Reassessment: 74-year-old presents as a level 2 stroke alert in triage last known well is unknown patient woke up with the confusion not following commands. No reported falls or trauma. Differential includes level 1 versus level 2 stroke versus thrombectomy candidate versus ICH versus mass versus UTI sepsis. Plan for lab work including CBC CMP troponin CT head. Will have evaluated by neurology. Also CTA perfusion as she is a level 2. These orders were placed during the ER evaluation:  Orders Placed This Encounter    MECHANICAL PROPHYLAXIS IS CONTRAINDICATED Other, please document Already on Anticoagulation     Already on Anticoagulation     Standing Status:   Standing     Number of Occurrences:   1     Order Specific Question:   Please Indicate Reason     Answer:    Other, please document    CT CODE NEURO HEAD WO CONTRAST     Standing Status:   Standing     Number of Occurrences:   1     Order Specific Question:   Reason for Exam     Answer:   Code Stroke Order Specific Question:   Transport     Answer:   Stretcher [5]     Order Specific Question:   Decision Support Exception     Answer:   Emergency Medical Condition (MA) [1]    CTA CODE NEURO HEAD AND NECK W CONT     Standing Status:   Standing     Number of Occurrences:   1     Order Specific Question:   Does patient have history of Renal Disease? Answer:   No     Order Specific Question:   Reason for Exam     Answer:   Code Stroke     Order Specific Question:   Transport     Answer:   Stretcher [5]     Order Specific Question:   Decision Support Exception     Answer:   Emergency Medical Condition (MA) [1]    XR CHEST PORT     Standing Status:   Standing     Number of Occurrences:   1     Order Specific Question:   Reason for Exam     Answer:   CVA    MRI BRAIN WO CONT     Standing Status:   Standing     Number of Occurrences:   1     Order Specific Question:   Transport     Answer:   Stretcher [5]     Order Specific Question:   Reason for Exam     Answer:   CVA, slurred speech    CBC WITH AUTOMATED DIFF     Standing Status:   Standing     Number of Occurrences:   1    METABOLIC PANEL, COMPREHENSIVE     Standing Status:   Standing     Number of Occurrences:   1    PROTHROMBIN TIME + INR     Standing Status:   Standing     Number of Occurrences:   1    TROPONIN-HIGH SENSITIVITY     Standing Status:   Standing     Number of Occurrences:   1    URINALYSIS W/ RFLX MICROSCOPIC     Standing Status:   Standing     Number of Occurrences:   1    METABOLIC PANEL, COMPREHENSIVE     Standing Status:   Standing     Number of Occurrences:   1    CBC WITH AUTOMATED DIFF     Standing Status:   Standing     Number of Occurrences:   1    DIET NPO     Standing Status:   Standing     Number of Occurrences:   1    CARDIAC MONITOR - ED ONLY     Standing Status:   Standing     Number of Occurrences:   1     Order Specific Question:   Type:      Answer:   Bedside    PULSE OXIMETRY CONTINUOUS     Standing Status:   Standing Number of Occurrences:   1    VITAL SIGNS     Standing Status:   Standing     Number of Occurrences:   1    INITIATE NURSING DYSPHAGIA SCREENING     Standing Status:   Standing     Number of Occurrences:   1    MEASURE HEIGHT     Standing Status:   Standing     Number of Occurrences:   1    WEIGH PATIENT     Standing Status:   Standing     Number of Occurrences:   1    NEUROLOGIC STATUS ASSESSMENT     Standing Status:   Standing     Number of Occurrences:   1    NIH STROKE SCALE ASSESSMENT     Standing Status:   Standing     Number of Occurrences:   1    ELEVATE HEAD OF BED     Standing Status:   Standing     Number of Occurrences:   1     Order Specific Question:   Degrees of Elevation     Answer:   30    OXYGEN CANNULA     Titrate up to 4l/minute to maintain oxygen saturations at 90% or greater     Standing Status:   Standing     Number of Occurrences:   1     Order Specific Question:   Liters per minute: Answer:   2     Order Specific Question:   Indications for O2 therapy     Answer:   OTHER    POC GLUCOSE     Standing Status:   Standing     Number of Occurrences:   1    CARDIAC MONITORING     Standing Status:   Standing     Number of Occurrences:   1     Order Specific Question:   Type: Answer:   Remote Telemetry     Order Specific Question:   Patient may go off unit without monitor     Answer:   No    POC GLUCOSE     Standing Status:   Standing     Number of Occurrences:   62    VITAL SIGNS     Per unit routine     Standing Status:   Standing     Number of Occurrences:   1    CARDIAC MONITORING     Standing Status:   Standing     Number of Occurrences:   1     Order Specific Question:   Type:      Answer:   Remote Telemetry     Order Specific Question:   Patient may go off unit without monitor     Answer:   No    ACTIVITY AS TOLERATED W/ASSIST     Standing Status:   Standing     Number of Occurrences:   1    NEURO/VASCULAR CHECKS     Standing Status:   Standing     Number of Occurrences:   1    FULL CODE     Standing Status:   Standing     Number of Occurrences:   1    IP CONSULT TO NEUROLOGY     Standing Status:   Standing     Number of Occurrences:   1     Order Specific Question:   Reason for Consult: Answer:   CVA     Order Specific Question:   Did you call or speak to the consulting provider? Answer:   Yes    IP CONSULT TO SPEECH THERAPY     Standing Status:   Standing     Number of Occurrences:   1     Order Specific Question:   Reason for SLP? Answer:   bedside swallow evaluation    EKG, 12 LEAD, INITIAL     Standing Status:   Standing     Number of Occurrences:   1     Order Specific Question:   Reason for Exam:     Answer:   CVA    SAMPLE TO BLOOD BANK     Standing Status:   Standing     Number of Occurrences:   1    SALINE LOCK IV ONE TIME STAT     Standing Status:   Standing     Number of Occurrences:   1    iopamidoL (ISOVUE-370) 370 mg iodine /mL (76 %) injection 100 mL    . PHARMACY TO SUBSTITUTE PER PROTOCOL (Reordered from: amLODIPine-benazepril (LOTREL) 10-40 mg per capsule)    aspirin chewable tablet 81 mg     OP SIG:Take 1 Tablet by mouth daily. clopidogreL (PLAVIX) tablet 75 mg    . PHARMACY TO SUBSTITUTE PER PROTOCOL (Reordered from: cyanocobalamin (VITAMIN B-12) 1,000 mcg sublingual tablet)    . PHARMACY TO SUBSTITUTE PER PROTOCOL (Reordered from: insulin glargine (Lantus Solostar U-100 Insulin) 100 unit/mL (3 mL) inpn)    . PHARMACY TO SUBSTITUTE PER PROTOCOL (Reordered from: Janumet 50-1,000 mg per tablet)    insulin lispro (HUMALOG) injection    glucose chewable tablet 16 g    glucagon (GLUCAGEN) injection 1 mg    dextrose 10% infusion 0-250 mL    sodium chloride (NS) flush 5-40 mL    sodium chloride (NS) flush 5-40 mL    OR Linked Order Group     acetaminophen (TYLENOL) tablet 650 mg     acetaminophen (TYLENOL) suppository 650 mg    polyethylene glycol (MIRALAX) packet 17 g    OR Linked Order Group     ondansetron (ZOFRAN ODT) tablet 4 mg     ondansetron (ZOFRAN) injection 4 mg    enoxaparin (LOVENOX) injection 40 mg    atorvastatin (LIPITOR) tablet 80 mg    INITIAL PHYSICIAN ORDER: INPATIENT Remote Telemetry; Yes; 3. Patient receiving treatment that can only be provided in an inpatient setting (further clarification in H&P documentation)     Standing Status:   Standing     Number of Occurrences:   1     Order Specific Question:   Status: Answer:   INPATIENT [101]     Order Specific Question:   Type of Bed     Answer:   Remote Telemetry [29]     Order Specific Question:   Cardiac Monitoring Required? Answer:   Yes     Order Specific Question:   Inpatient Hospitalization Certified Necessary for the Following Reasons     Answer:   3. Patient receiving treatment that can only be provided in an inpatient setting (further clarification in H&P documentation)     Order Specific Question:   Admitting Diagnosis     Answer:   CVA (cerebral vascular accident) [facility]) [[id number]]     Order Specific Question:   Admitting Physician     Answer:   Harriet Teague [[id number]]     Order Specific Question:   Attending Physician     Answer:   Harriet eTague [[id number]]     Order Specific Question:   Estimated Length of Stay     Answer:   3-4 Midnights     Order Specific Question:   Discharge Plan:     Answer:   Home with Office Follow-up        Patient was given the following medications:  Medications   . PHARMACY TO SUBSTITUTE PER PROTOCOL (Reordered from: amLODIPine-benazepril (LOTREL) 10-40 mg per capsule) (has no administration in time range)   aspirin chewable tablet 81 mg (has no administration in time range)   clopidogreL (PLAVIX) tablet 75 mg ( Oral Automatically Held 3/18/23 0900)   . PHARMACY TO SUBSTITUTE PER PROTOCOL (Reordered from: cyanocobalamin (VITAMIN B-12) 1,000 mcg sublingual tablet) (has no administration in time range)   . PHARMACY TO SUBSTITUTE PER PROTOCOL (Reordered from: insulin glargine (Lantus Solostar U-100 Insulin) 100 unit/mL (3 mL) inpn) (has no administration in time range) [facility] PHARMACY TO SUBSTITUTE PER PROTOCOL (Reordered from: Janumet 50-1,000 mg per tablet) (has no administration in time range)   insulin lispro (HUMALOG) injection (has no administration in time range)   glucose chewable tablet 16 g (has no administration in time range)   glucagon (GLUCAGEN) injection 1 mg (has no administration in time range)   dextrose 10% infusion 0-250 mL (has no administration in time range)   sodium chloride (NS) flush 5-40 mL (has no administration in time range)   sodium chloride (NS) flush 5-40 mL (has no administration in time range)   acetaminophen (TYLENOL) tablet 650 mg (has no administration in time range)     Or   acetaminophen (TYLENOL) suppository 650 mg (has no administration in time range)   polyethylene glycol (MIRALAX) packet 17 g (has no administration in time range)   ondansetron (ZOFRAN ODT) tablet 4 mg (has no administration in time range)     Or   ondansetron (ZOFRAN) injection 4 mg (has no administration in time range)   enoxaparin (LOVENOX) injection 40 mg (has no administration in time range)   atorvastatin (LIPITOR) tablet 80 mg (has no administration in time range)   iopamidoL (ISOVUE-370) 370 mg iodine /mL (76 %) injection 100 mL (100 mL IntraVENous Given 3/3/23 1107)           ED Course:       ED Course as of 03/03/23 1443   Fri Mar 03, 2023   1207 Soc neuro. Lkw unknown. W8 cta,  [HP]   1210 Rads again notified of cta result need [HP]   1230 Reported on plavix, lkw unknown, cta neg, not tpa canidate [HP]   1305 EKG at 1212 normal sinus rhythm rate of 89. No ST changes. Rate dysrhythmia. Interpreted by ER physician. [HP]   3452 D/w dr Sanchez Ramos admit [HP]   1021 Deidra Farrell has discussed the case with patient and family although patient's not able to provide any history family notes that she has not been compliant on her Plavix. Boxes open at home but bottles are full. Unknown the last time she is taken it.  [HP]      ED Course User Index  [HP] David Adams MD Vital Signs-Reviewed the patient's vital signs. Patient Vitals for the past 24 hrs:   Temp Pulse Resp BP SpO2   03/03/23 1337 -- 82 16 (!) 156/71 99 %   03/03/23 1318 -- 84 15 (!) 160/78 99 %   03/03/23 1258 -- 85 12 (!) 143/69 99 %   03/03/23 1248 -- 86 14 (!) 140/89 99 %   03/03/23 1228 -- 81 16 (!) 154/70 98 %   03/03/23 1218 -- 81 16 (!) 181/80 98 %   03/03/23 1049 98.2 °F (36.8 °C) 85 18 (!) 161/72 98 %     Vitals interpreted independently by ER physician. stable    Medical decision making tools:                No data recorded          Disposition Considerations (Tests not done, Shared Decision Making, Pt Expectation of Test or Tx.): Patient was evaluated by neurology does not feel that patient is a tPA candidate or TNK candidate secondary to duration of time possibly being on Plavix. Also not a thrombectomy candidate as she does not have an LVO seen on CTA. Patient will be admitted to the hospital given aspirin here in the ER p.o.    CONSULTS: (Who and What was discussed)  IP CONSULT TO NEUROLOGY    Chronic Conditions: no    Social Determinants affecting Dx or Tx: Patient lacks support at home or lives alone. Records Reviewed (source and summary of external notes): Prior medical records  Records Reviewed: Previous Hospital chart. EMS run report. I reviewed the vital signs, available nursing notes, past medical history, past surgical history, family history and social history. Initial assessment performed. The patients presenting problems have been discussed, and they are in agreement with the care plan formulated and outlined with them. I have encouraged them to ask questions as they arise throughout their visit.         Admitted      Procedures             CRITICAL CARE NOTE :  2:45 PM  Amount of Critical Care Time: 35 minutes    IMPENDING DETERIORATION -Airway, Respiratory, Cardiovascular, and CNS  ASSOCIATED RISK FACTORS - CNS Decompensation  MANAGEMENT- Bedside Assessment and Supervision of Care  INTERPRETATION -  CT Scan  INTERVENTIONS - hemodynamic mngmt  CASE REVIEW - Medical Sub-Specialist  TREATMENT RESPONSE -Stable  PERFORMED BY - Self    NOTES   :  I have spent critical care time involved in lab review, consultations with specialist, family decision- making, bedside attention and documentation. This time excludes time spent in any separate billed procedures. During this entire length of time I was immediately available to the patient . Calvin Nunez MD      Disposition       Emergency Department Disposition:  Admitted      Diagnosis     Clinical Impression: CVA    Attestations:    Calvin Nunez MD    Please note that this dictation was completed with [employer], the computer voice recognition software. Quite often unanticipated grammatical, syntax, homophones, and other interpretive errors are inadvertently transcribed by the computer software. Please disregard these errors. Please excuse any errors that have escaped final proofreading. Thank you.

## 2023-03-04 ENCOUNTER — APPOINTMENT (OUTPATIENT)
Dept: NON INVASIVE DIAGNOSTICS | Age: 78
DRG: 065 | End: 2023-03-04
Attending: PSYCHIATRY & NEUROLOGY
Payer: MEDICARE

## 2023-03-04 ENCOUNTER — APPOINTMENT (OUTPATIENT)
Dept: GENERAL RADIOLOGY | Age: 78
DRG: 065 | End: 2023-03-04
Attending: INTERNAL MEDICINE
Payer: MEDICARE

## 2023-03-04 LAB
ALBUMIN SERPL-MCNC: 3.3 G/DL (ref 3.5–5)
ALBUMIN/GLOB SERPL: 1.1 (ref 1.1–2.2)
ALP SERPL-CCNC: 71 U/L (ref 45–117)
ALT SERPL-CCNC: 12 U/L (ref 12–78)
ANION GAP SERPL CALC-SCNC: 7 MMOL/L (ref 5–15)
APPEARANCE UR: ABNORMAL
AST SERPL W P-5'-P-CCNC: 5 U/L (ref 15–37)
BACTERIA URNS QL MICRO: NEGATIVE /HPF
BACTERIA URNS QL MICRO: NEGATIVE /HPF
BASOPHILS # BLD: 0 K/UL (ref 0–0.1)
BASOPHILS NFR BLD: 0 % (ref 0–1)
BILIRUB SERPL-MCNC: 0.4 MG/DL (ref 0.2–1)
BILIRUB UR QL: NEGATIVE
BUN SERPL-MCNC: 23 MG/DL (ref 6–20)
BUN/CREAT SERPL: 27 (ref 12–20)
CA-I BLD-MCNC: 8.8 MG/DL (ref 8.5–10.1)
CHLORIDE SERPL-SCNC: 105 MMOL/L (ref 97–108)
CO2 SERPL-SCNC: 22 MMOL/L (ref 21–32)
COLOR UR: ABNORMAL
CREAT SERPL-MCNC: 0.86 MG/DL (ref 0.55–1.02)
DIFFERENTIAL METHOD BLD: ABNORMAL
ECHO AO ROOT DIAM: 2.9 CM
ECHO AO ROOT INDEX: 1.58 CM/M2
ECHO AV PEAK GRADIENT: 16 MMHG
ECHO AV PEAK VELOCITY: 2 M/S
ECHO AV VELOCITY RATIO: 0.65
ECHO LA DIAMETER INDEX: 1.69 CM/M2
ECHO LA DIAMETER: 3.1 CM
ECHO LA TO AORTIC ROOT RATIO: 1.07
ECHO LV E' SEPTAL VELOCITY: 9 CM/S
ECHO LV FRACTIONAL SHORTENING: 35 % (ref 28–44)
ECHO LV INTERNAL DIMENSION DIASTOLE INDEX: 2.19 CM/M2
ECHO LV INTERNAL DIMENSION DIASTOLIC: 4 CM (ref 3.9–5.3)
ECHO LV INTERNAL DIMENSION SYSTOLIC INDEX: 1.42 CM/M2
ECHO LV INTERNAL DIMENSION SYSTOLIC: 2.6 CM
ECHO LV IVSD: 1.3 CM (ref 0.6–0.9)
ECHO LV MASS 2D: 175.8 G (ref 67–162)
ECHO LV MASS INDEX 2D: 96.1 G/M2 (ref 43–95)
ECHO LV POSTERIOR WALL DIASTOLIC: 1.2 CM (ref 0.6–0.9)
ECHO LV RELATIVE WALL THICKNESS RATIO: 0.6
ECHO LVOT PEAK GRADIENT: 7 MMHG
ECHO LVOT PEAK VELOCITY: 1.3 M/S
ECHO MV A VELOCITY: 1.5 M/S
ECHO MV E DECELERATION TIME (DT): 92 MS
ECHO MV E VELOCITY: 0.74 M/S
ECHO MV E/A RATIO: 0.49
ECHO MV E/E' SEPTAL: 8.22
ECHO PV MAX VELOCITY: 1.4 M/S
ECHO PV PEAK GRADIENT: 8 MMHG
ECHO PVEIN A DURATION: 103 MS
ECHO PVEIN A VELOCITY: 0.4 M/S
ECHO RA AREA 4C: 16.4 CM2
ECHO RA END SYSTOLIC VOLUME APICAL 4 CHAMBER INDEX BSA: 23 ML/M2
ECHO RA VOLUME: 43 ML
ECHO RV BASAL DIMENSION: 3.1 CM
EOSINOPHIL # BLD: 0.1 K/UL (ref 0–0.4)
EOSINOPHIL NFR BLD: 1 % (ref 0–7)
EPITH CASTS URNS QL MICRO: ABNORMAL /LPF
ERYTHROCYTE [DISTWIDTH] IN BLOOD BY AUTOMATED COUNT: 13.1 % (ref 11.5–14.5)
GLOBULIN SER CALC-MCNC: 3 G/DL (ref 2–4)
GLUCOSE BLD STRIP.AUTO-MCNC: 172 MG/DL (ref 65–100)
GLUCOSE BLD STRIP.AUTO-MCNC: 179 MG/DL (ref 65–100)
GLUCOSE BLD STRIP.AUTO-MCNC: 222 MG/DL (ref 65–100)
GLUCOSE BLD STRIP.AUTO-MCNC: 222 MG/DL (ref 65–100)
GLUCOSE BLD STRIP.AUTO-MCNC: 232 MG/DL (ref 65–100)
GLUCOSE BLD STRIP.AUTO-MCNC: 266 MG/DL (ref 65–100)
GLUCOSE BLD STRIP.AUTO-MCNC: 276 MG/DL (ref 65–100)
GLUCOSE SERPL-MCNC: 244 MG/DL (ref 65–100)
GLUCOSE UR STRIP.AUTO-MCNC: >300 MG/DL
HCT VFR BLD AUTO: 33.7 % (ref 35–47)
HGB BLD-MCNC: 11.1 G/DL (ref 11.5–16)
HGB UR QL STRIP: NEGATIVE
IMM GRANULOCYTES # BLD AUTO: 0 K/UL (ref 0–0.04)
IMM GRANULOCYTES NFR BLD AUTO: 0 % (ref 0–0.5)
KETONES UR QL STRIP.AUTO: 20 MG/DL
LEUKOCYTE ESTERASE UR QL STRIP.AUTO: ABNORMAL
LYMPHOCYTES # BLD: 0.9 K/UL (ref 0.8–3.5)
LYMPHOCYTES NFR BLD: 13 % (ref 12–49)
MCH RBC QN AUTO: 31.9 PG (ref 26–34)
MCHC RBC AUTO-ENTMCNC: 32.9 G/DL (ref 30–36.5)
MCV RBC AUTO: 96.8 FL (ref 80–99)
MONOCYTES # BLD: 0.6 K/UL (ref 0–1)
MONOCYTES NFR BLD: 9 % (ref 5–13)
MUCOUS THREADS URNS QL MICRO: ABNORMAL /LPF
MUCOUS THREADS URNS QL MICRO: ABNORMAL /LPF
NEUTS SEG # BLD: 5.4 K/UL (ref 1.8–8)
NEUTS SEG NFR BLD: 77 % (ref 32–75)
NITRITE UR QL STRIP.AUTO: NEGATIVE
NRBC # BLD: 0 K/UL (ref 0–0.01)
NRBC BLD-RTO: 0 PER 100 WBC
PERFORMED BY, TECHID: ABNORMAL
PH UR STRIP: 5 (ref 5–8)
PLATELET # BLD AUTO: 327 K/UL (ref 150–400)
PMV BLD AUTO: 9.4 FL (ref 8.9–12.9)
POTASSIUM SERPL-SCNC: 4.2 MMOL/L (ref 3.5–5.1)
PROT SERPL-MCNC: 6.3 G/DL (ref 6.4–8.2)
PROT UR STRIP-MCNC: NEGATIVE MG/DL
RBC # BLD AUTO: 3.48 M/UL (ref 3.8–5.2)
RBC #/AREA URNS HPF: ABNORMAL /HPF (ref 0–5)
RBC #/AREA URNS HPF: ABNORMAL /HPF (ref 0–5)
SODIUM SERPL-SCNC: 134 MMOL/L (ref 136–145)
SP GR UR REFRACTOMETRY: 1.02 (ref 1–1.03)
TSH SERPL DL<=0.05 MIU/L-ACNC: 1.08 UIU/ML (ref 0.36–3.74)
UROBILINOGEN UR QL STRIP.AUTO: 0.1 EU/DL (ref 0.1–1)
WBC # BLD AUTO: 7 K/UL (ref 3.6–11)
WBC URNS QL MICRO: ABNORMAL /HPF (ref 0–4)
WBC URNS QL MICRO: ABNORMAL /HPF (ref 0–4)

## 2023-03-04 PROCEDURE — 85025 COMPLETE CBC W/AUTO DIFF WBC: CPT

## 2023-03-04 PROCEDURE — 82962 GLUCOSE BLOOD TEST: CPT

## 2023-03-04 PROCEDURE — 74011000250 HC RX REV CODE- 250: Performed by: NURSE PRACTITIONER

## 2023-03-04 PROCEDURE — 92526 ORAL FUNCTION THERAPY: CPT

## 2023-03-04 PROCEDURE — 93880 EXTRACRANIAL BILAT STUDY: CPT

## 2023-03-04 PROCEDURE — 71045 X-RAY EXAM CHEST 1 VIEW: CPT

## 2023-03-04 PROCEDURE — 92523 SPEECH SOUND LANG COMPREHEN: CPT

## 2023-03-04 PROCEDURE — 84443 ASSAY THYROID STIM HORMONE: CPT

## 2023-03-04 PROCEDURE — 74011250636 HC RX REV CODE- 250/636: Performed by: NURSE PRACTITIONER

## 2023-03-04 PROCEDURE — 74011250636 HC RX REV CODE- 250/636: Performed by: INTERNAL MEDICINE

## 2023-03-04 PROCEDURE — 97530 THERAPEUTIC ACTIVITIES: CPT

## 2023-03-04 PROCEDURE — 97161 PT EVAL LOW COMPLEX 20 MIN: CPT

## 2023-03-04 PROCEDURE — 36415 COLL VENOUS BLD VENIPUNCTURE: CPT

## 2023-03-04 PROCEDURE — 74011250637 HC RX REV CODE- 250/637: Performed by: NURSE PRACTITIONER

## 2023-03-04 PROCEDURE — 80053 COMPREHEN METABOLIC PANEL: CPT

## 2023-03-04 PROCEDURE — 93306 TTE W/DOPPLER COMPLETE: CPT

## 2023-03-04 PROCEDURE — 81001 URINALYSIS AUTO W/SCOPE: CPT

## 2023-03-04 PROCEDURE — 93306 TTE W/DOPPLER COMPLETE: CPT | Performed by: SPECIALIST

## 2023-03-04 PROCEDURE — 97162 PT EVAL MOD COMPLEX 30 MIN: CPT

## 2023-03-04 PROCEDURE — 80061 LIPID PANEL: CPT

## 2023-03-04 PROCEDURE — 65270000029 HC RM PRIVATE

## 2023-03-04 PROCEDURE — 74011636637 HC RX REV CODE- 636/637: Performed by: NURSE PRACTITIONER

## 2023-03-04 RX ORDER — ENOXAPARIN SODIUM 100 MG/ML
40 INJECTION SUBCUTANEOUS EVERY 24 HOURS
Status: DISCONTINUED | OUTPATIENT
Start: 2023-03-04 | End: 2023-03-09 | Stop reason: HOSPADM

## 2023-03-04 RX ORDER — ENOXAPARIN SODIUM 100 MG/ML
40 INJECTION SUBCUTANEOUS EVERY 24 HOURS
Status: DISCONTINUED | OUTPATIENT
Start: 2023-03-05 | End: 2023-03-04

## 2023-03-04 RX ADMIN — CYANOCOBALAMIN TAB 1000 MCG 1000 MCG: 1000 TAB at 10:07

## 2023-03-04 RX ADMIN — AMLODIPINE BESYLATE 10 MG: 5 TABLET ORAL at 10:07

## 2023-03-04 RX ADMIN — ONDANSETRON 4 MG: 2 INJECTION INTRAMUSCULAR; INTRAVENOUS at 06:10

## 2023-03-04 RX ADMIN — SODIUM CHLORIDE, PRESERVATIVE FREE 10 ML: 5 INJECTION INTRAVENOUS at 15:05

## 2023-03-04 RX ADMIN — LISINOPRIL 40 MG: 40 TABLET ORAL at 10:07

## 2023-03-04 RX ADMIN — INSULIN LISPRO 6 UNITS: 100 INJECTION, SOLUTION INTRAVENOUS; SUBCUTANEOUS at 14:32

## 2023-03-04 RX ADMIN — INSULIN LISPRO 6 UNITS: 100 INJECTION, SOLUTION INTRAVENOUS; SUBCUTANEOUS at 23:41

## 2023-03-04 RX ADMIN — ASPIRIN 81 MG CHEWABLE TABLET 81 MG: 81 TABLET CHEWABLE at 10:07

## 2023-03-04 RX ADMIN — INSULIN LISPRO 6 UNITS: 100 INJECTION, SOLUTION INTRAVENOUS; SUBCUTANEOUS at 10:00

## 2023-03-04 RX ADMIN — INSULIN LISPRO 3 UNITS: 100 INJECTION, SOLUTION INTRAVENOUS; SUBCUTANEOUS at 18:27

## 2023-03-04 RX ADMIN — SODIUM CHLORIDE 75 ML/HR: 9 INJECTION, SOLUTION INTRAVENOUS at 22:41

## 2023-03-04 RX ADMIN — SODIUM CHLORIDE, PRESERVATIVE FREE 10 ML: 5 INJECTION INTRAVENOUS at 22:36

## 2023-03-04 RX ADMIN — ENOXAPARIN SODIUM 40 MG: 100 INJECTION SUBCUTANEOUS at 10:06

## 2023-03-04 RX ADMIN — CLOPIDOGREL BISULFATE 75 MG: 75 TABLET ORAL at 10:06

## 2023-03-04 RX ADMIN — ATORVASTATIN CALCIUM 80 MG: 40 TABLET, FILM COATED ORAL at 22:36

## 2023-03-04 RX ADMIN — SODIUM CHLORIDE 75 ML/HR: 9 INJECTION, SOLUTION INTRAVENOUS at 07:23

## 2023-03-04 RX ADMIN — SODIUM CHLORIDE, PRESERVATIVE FREE 10 ML: 5 INJECTION INTRAVENOUS at 05:13

## 2023-03-04 NOTE — PROGRESS NOTES
PHYSICAL THERAPY EVALUATION  Patient: Tommy Muñoz (71 y.o. female)  Date: 3/4/2023  Primary Diagnosis: CVA (cerebral vascular accident) [facility]) [I63.9]       Precautions: Fall    In place during session: Peripheral IV    ASSESSMENT  Pt is a 78 y.o. female admitted on 03/03/2023 for rt side facial droop and rt side weakness; pt currently being treated for stroke . Pt semis upine  upon PT arrival, agreeable to evaluation. Pt A&O x 2. IMPRESSION  CTA Head:  1. No evidence of flow-limiting stenosis or aneurysm. Scattered atherosclerotic  disease as above, including severe stenosis in the right P2 segment, which is  unchanged. CTA Neck:  1. No evidence of flow-limiting stenosis. 2. Evaluation of the carotid bifurcations is limited by motion, though there are  suspected moderate stenoses of the bilateral proximal internal carotid arteries,  right worse than left. MRI   1. Multiple acute to subacute infarcts in the left cerebral hemisphere  predominantly within the left frontoparietal deep white matter, and in a pattern  suggestive of watershed infarction. 2. Minimal chronic microvascular ischemic disease. Chronic infarcts in the  bilateral basal ganglia, left larger than right, and left cerebellum. Based on the objective data described, the patient presents with generalized weakness, impaired functional mobility, impaired amb, impaired balance, and decreased endurance to activities. (See below for objective details and assist levels). Patient with rt side weakness unable to move rt UE/LE. Patient with poor sitting balance and requiring max assist for bed mob and supine<->sit. Patient with cognitive impairment. Overall pt tolerated session fair today with PT. Pt will benefit from continued skilled PT to address above deficits and return to PLOF. Current PT DC recommendation Inpatient Rehabilitation Facility  once medically appropriate.     Current Level of Function Impacting Discharge (mobility/balance): Patient with deficits for strength, balance and mobility impairment patient lives with 100 yr mother who can amb with walker. Patient was indep prior to adm. Other factors to consider for discharge: IRF      PLAN :  Recommendations and Planned Interventions: bed mobility training, transfer training, gait training, therapeutic exercises, patient and family training/education, and therapeutic activities      Recommend for staff: Out of bed to chair for meals, Encourage HEP in prep for ADLs/mobility, Use of bed/chair alarm for safety, and LE elevation for management of edema    Frequency/Duration: Patient will be followed by physical therapy:  3-5x/week to address goals. Recommendation for discharge: (in order for the patient to meet his/her long term goals)  [facility]     This discharge recommendation:  Has been made in collaboration with the attending provider and/or case management    IF patient discharges home will need the following DME: to be determined (TBD)         SUBJECTIVE:   Patient stated ok.     OBJECTIVE DATA SUMMARY:   HISTORY:    Past Medical History:   Diagnosis Date    Diabetes ([facility] [address]     Hypercholesterolemia     Hypertension     Stroke [facility])      Past Surgical History:   Procedure Laterality Date    HX BACK SURGERY      HX CARPAL TUNNEL RELEASE      HX CATARACT REMOVAL Bilateral     HX ORTHOPAEDIC      ankle       Home Situation  Home Environment: Private residence  # Steps to Enter: 4  One/Two Story Residence: One story  Living Alone: No  Support Systems: Parent(s)  Patient Expects to be Discharged to[de-identified] Rehab hospital/unit acute  Current DME Used/Available at Home: Walker, rolling    EXAMINATION/PRESENTATION/DECISION MAKING:   Critical Behavior:  Neurologic State: Alert  Orientation Level: Oriented to place, Oriented to situation  Cognition: Impaired decision making  Safety/Judgement: Decreased awareness of environment, Decreased awareness of need for assistance, Decreased awareness of need for safety, Decreased insight into deficits  Hearing:     Skin:  intact  Edema:   Range Of Motion:  AROM: Generally decreased, functional                       Strength:    Strength: Generally decreased, functional (Rt side weakness)                    Tone & Sensation:   Tone: Abnormal (rt side weakness)                             Functional Mobility:  Bed Mobility:  Rolling: Moderate assistance;Assist x2  Supine to Sit: Moderate assistance;Maximum assistance;Assist x2  Sit to Supine: Moderate assistance;Maximum assistance;Assist x2                   Balance:   Sitting: Impaired;High guard; With support  Sitting - Static: Poor (constant support)                    Functional Measure:  [facility] AM-PAC 6 Clicks         Basic Mobility Inpatient Short Form  How much difficulty does the patient currently have. .. Unable A Lot A Little None   1. Turning over in bed (including adjusting bedclothes, sheets and blankets)? [] 1   [x] 2   [] 3   [] 4   2. Sitting down on and standing up from a chair with arms ( e.g., wheelchair, bedside commode, etc.)   [x] 1   [] 2   [] 3   [] 4   3. Moving from lying on back to sitting on the side of the bed? [] 1   [x] 2   [] 3   [] 4          How much help from another person does the patient currently need. .. Total A Lot A Little None   4. Moving to and from a bed to a chair (including a wheelchair)? [x] 1   [] 2   [] 3   [] 4   5. Need to walk in hospital room? [x] 1   [] 2   [] 3   [] 4   6. Climbing 3-5 steps with a railing? [x] 1   [] 2   [] 3   [] 4   © 2007, Trustees of [facility], under license to [employer]. All rights reserved     Score:  Initial: 8/24 Most Recent: X (Date: 03/04/2023 )   Interpretation of Tool:  Represents activities that are increasingly more difficult (i.e. Bed mobility, Transfers, Gait).   Score 24 23 22-20 19-15 14-10 9-7 6   Modifier CH CI CJ CK CL CM CN         Physical Therapy Evaluation Charge Determination   History Examination Presentation Decision-Making   LOW Complexity : Zero comorbidities / personal factors that will impact the outcome / POC MEDIUM Complexity : 3 Standardized tests and measures addressing body structure, function, activity limitation and / or participation in recreation  MEDIUM Complexity : Evolving with changing characteristics  Other outcome measures [facility] 6        Based on the above components, the patient evaluation is determined to be of the following complexity level: MEDIUM    Pain Ratin/10     Activity Tolerance:   Fair    After treatment patient left in no apparent distress:   Bed locked and in lowest position Supine in bed, Heels elevated for pressure relief, Call bell within reach, Bed / chair alarm activated, Side rails x 3, and 1-1 care giver  . GOALS:    Problem: Mobility Impaired (Adult and Pediatric)  Goal: *Acute Goals and Plan of Care (Insert Text)  Description: FUNCTIONAL STATUS PRIOR TO ADMISSION: Patient was modified independent using a single point cane for functional mobility. HOME SUPPORT PRIOR TO ADMISSION: The patient lived with 8 years old mother but did not require assist.    Patient stated goal: none verbalized  Patient will move from supine to sit and sit to supine , scoot up and down, and roll side to side in bed with moderate assistance  within 7 day(s). Patient will transfer from bed to chair and chair to bed with moderate assistance  using the least restrictive device within 7 day(s). Patient will improve static sitting balance to minimal assistance within 1 week(s). Patient will ambulate 10 feet with moderate assistance with least restrictive device within 1 weeks. Outcome: Not Progressing Towards Goal       COMMUNICATION/EDUCATION:   The patients plan of care was discussed with: Occupational therapist, Registered nurse, and Case management.      Fall prevention education was provided and the patient/caregiver indicated understanding., Patient/family have participated as able in goal setting and plan of care. , and Patient/family agree to work toward stated goals and plan of care.          Thank you for this referral.  Cris Nunes, PT   Time Calculation: 28 mins

## 2023-03-04 NOTE — PROGRESS NOTES
SPEECH LANGUAGE PATHOLOGY EVALUATION and DYSPHAGIA TREATMENT NARRATIVE  Patient: Liberty Laughlin  (78 y.o. )  Date: 3/4/2023  Primary Diagnosis: CVA     Precautions:  Aspiration and Communication    COGNITIVE LINGUISTIC ASSESSMENT :  Patient seen sitting upright in bed, alert and cooperative. Sister present. Rn and 1:1 present throughout evaluation and treatment session. R-side facial weakness present. Patient participates in subtest of MOCA and informal assessment and exhibits the following deficits   Confrontational namin% accuracy, perseverating on \"knife\"  Object ID, F:2: 0% accuracy   Following commands, 1 step: 20% accuracy, perseverating   Automatic speech: KEISHA: 92% accuracy; counting 1-10: 0% accuracy, % accuracy  Orientation: 0x1, self only  Immediate recall: 0% accuracy  Short-term recall: 0% accuracy  Divergent Logic/Word fluency: 2 items from common category  Attention: digit span F:1-5, 0% accuracy  Single digit math: 0% accuracy     Speech: Patient presenting with mild to moderate dysarthria with imprecise articulation, slurred speech and intelligibility fluctuating ~50-70% to unfamiliar conversational partner. Patient reports she is unable to imitate targets for ARMs and SMRs baseline outcomes. Dysphagia Treatment Narrative:  Chart review of diet: NPO, sips of water. ST evaluation narrative from 3/3/23 indicates Full Mod Thick liquid diet. Patient is agreeable to po trials of Puree/Mod thick liquids. Patient with significant r-side labial spillage with liquids and puree trials, r-side anterior and lateral sulci pocketing with puree with limited to no awareness of bolus and patient unable to manipulate bolus with max verbal cueing and gestural cueing for re-alignment of bolus. Patient inconsistently clears pocketed bolus with liquid wash. As po trials progress, oral phase disorganization and mismanagement worsens, with increased spillage and subsequent coughing.  Patient is also unable to manage secretions and exhibits coughing as patient's swallow function appears to fatigue. Upon digital palpation: swallow initiation delayed and HLE appears intermittently weak/reduced. RN administering meds crushed in puree and patient is not effectively able to manage with subsequent spillage and reduced ingestion of medications s/t oral phase dysfunction. Clinician recommends suction device to assist patient with management of secretions and RN obtains. Patient will benefit from skilled intervention to address the above impairments. Patients rehabilitation potential is considered to be Good     PLAN :  Recommendations and Planned Interventions:  NPO no exceptions  Alternate source of nutrient intake  MBS on 3/6/23  ST and SW tx  Further assessment of speech  Frequency/Duration: Patient will be followed by speech-language pathology 5 times a week to address goals. Discharge Recommendations: To Be Determined     SUBJECTIVE:   Patient states \"I have a long ways to go. \"  Pain Level (0-10 scale): 0  0/10       OBJECTIVE:   See outcomes above    Pain Level (0-10 scale) post treatment: 0/10  After treatment:   Patient left in no apparent distress in bed and Nursing notified    COMMUNICATION/EDUCATION:   Patient was educated regarding her deficit(s) of dysphagia and cognitive linguistic skills, though requires additional training and education. The patient's plan of care including recommendations, planned interventions, and recommended diet changes were discussed with: Registered nurse and Physician. Patient/family have participated as able in goal setting and plan of care.     Thank you for this referral.  Sage Lawson M.S., CCC-SLP  Time Calculation: 26 mins   Dysphagia Treatment: 10 minutes  Cognitive Linguistic Assessment: 16 minutes    Problem: Dysphagia (Adult)  Goal: *Acute Goals and Plan of Care (Insert Text)  Description: Speech Therapy Swallow Goals  Initiated 3/3/2023  -Patient will tolerate full diet with moderately thick liquids without clinical indicators of aspiration given moderate cues within 7 day(s). Not Met        -Patient will tolerate PO trials without clinical indicators of aspiration given moderate cues within 7 day(s). -Patient will participate in modified barium swallow study within 7 day(s). -Patient will demonstrate understanding of swallow safety precautions and aspiration precautions, diet recs with moderate cues within 7 day(s). -Patient will participate in speech language evaluation.     -Patient/caregiver goal: eat safely  Outcome: Progressing Towards Goal     Problem: Patient Education: Go to Patient Education Activity  Goal: Patient/Family Education  Outcome: Progressing Towards Goal     Problem: Patient Education: Go to Patient Education Activity  Goal: Patient/Family Education  Description: Patient to participate in automatic speech tasks: count 1-10 with 60% accuracy  Patient to name/label environmental items with 60% accuracy. Patient to ID from a F:2 with 60% accuracy. Patient to follow 1 step commands with 60% accuracy. Patient orientation x4 with external aids.    Outcome: Not Met

## 2023-03-04 NOTE — PROGRESS NOTES
Hospitalist Progress Note               Daily Progress Note: 3/4/2023      Subjective:   Hospital course to date:    Patient is a 79 yo female with a PMH of type 2 DM, HTN, previous smoker, and CVA that occurred in 2021 without residual symptoms who was admitted from the ED on 3/3 for slurred speech and and right-sided weakness. Exam shows right upper extremity drift and expressive aphasia. MRI of the head without contrast showed acute to subacute infarcts in the left cerebral hemisphere within the left frontoparietal deep white matter, and in a pattern suggestive of watershed infarction. Labs on admission include Sodium 132, Glucose 276, Creatinine 1.06.     --------  Patient is seen today for follow-up. BP is elevated at 126/58 and with low-grade fever at 99.2 degrees F. Patient has slurred speech and has difficulty answering questions. History was received also from 1-to-1. Awaiting carotid duplex US and Echo result. Neurology consulted and concluded the patient should continue stroke woke-up and order modified barium swallow and swallowing screen. Speech pathology consulted and stated the patient cannot tolerate po medication    Labs today show sodium 134    Confirms slight numbness in right side of body and difficulty speaking. Confirms numbness in lower extremity and distal right upper extremity.       Problem List:  Problem List as of 3/4/2023 Date Reviewed: 3/3/2023            Codes Class Noted - Resolved    CVA (cerebral vascular accident) [facility]) ICD-10-CM: I63.9  ICD-9-CM: 434.91  3/3/2023 - Present        Acute right-sided weakness ICD-10-CM: R53.1  ICD-9-CM: 728.87  3/3/2023 - Present        Osteoporosis ICD-10-CM: M81.0  ICD-9-CM: 733.00  12/17/2022 - Present        Vitamin D deficiency ICD-10-CM: E55.9  ICD-9-CM: 268.9  12/17/2022 - Present        History of colon polyps ICD-10-CM: Z86.010  ICD-9-CM: V12.72  12/17/2022 - Present        Macrocytosis without anemia ICD-10-CM: D75.89  ICD-9-CM: 289.89  12/17/2022 - Present        Diabetes mellitus ([facility] [address] ICD-10-CM: E11.9  ICD-9-CM: 250.00  3/11/2022 - Present        Benign essential HTN ICD-10-CM: I10  ICD-9-CM: 401.1  3/11/2022 - Present        Mixed hyperlipidemia ICD-10-CM: E78.2  ICD-9-CM: 272.2  3/11/2022 - Present        History of CVA (cerebrovascular accident) ICD-10-CM: Z86.73  ICD-9-CM: V12.54  3/11/2022 - Present        TIA (transient ischemic attack) ICD-10-CM: G45.9  ICD-9-CM: 435.9  12/8/2021 - Present        Slurred speech ICD-10-CM: R47.81  ICD-9-CM: 784.59  12/8/2021 - Present         Medications reviewed  Current Facility-Administered Medications   Medication Dose Route Frequency    enoxaparin (LOVENOX) injection 40 mg  40 mg SubCUTAneous Q24H    aspirin chewable tablet 81 mg  81 mg Oral DAILY    clopidogreL (PLAVIX) tablet 75 mg  75 mg Oral DAILY    cyanocobalamin tablet 1,000 mcg  1,000 mcg Oral DAILY    [Held by provider] insulin glargine (LANTUS) injection 14 Units  14 Units SubCUTAneous QHS    insulin lispro (HUMALOG) injection   SubCUTAneous AC&HS    glucose chewable tablet 16 g  4 Tablet Oral PRN    glucagon (GLUCAGEN) injection 1 mg  1 mg IntraMUSCular PRN    dextrose 10% infusion 0-250 mL  0-250 mL IntraVENous PRN    sodium chloride (NS) flush 5-40 mL  5-40 mL IntraVENous Q8H    sodium chloride (NS) flush 5-40 mL  5-40 mL IntraVENous PRN    acetaminophen (TYLENOL) tablet 650 mg  650 mg Oral Q6H PRN    Or    acetaminophen (TYLENOL) suppository 650 mg  650 mg Rectal Q6H PRN    polyethylene glycol (MIRALAX) packet 17 g  17 g Oral DAILY PRN    ondansetron (ZOFRAN ODT) tablet 4 mg  4 mg Oral Q8H PRN    Or    ondansetron (ZOFRAN) injection 4 mg  4 mg IntraVENous Q6H PRN    atorvastatin (LIPITOR) tablet 80 mg  80 mg Oral QHS    [Held by provider] alogliptin (NESINA) tablet 12.5 mg  12.5 mg Oral DAILY    And    [Held by provider] metFORMIN (GLUCOPHAGE) tablet 1,000 mg  1,000 mg Oral BID WITH MEALS    amLODIPine (NORVASC) tablet 10 mg  10 mg Oral DAILY    And    lisinopriL (PRINIVIL, ZESTRIL) tablet 40 mg  40 mg Oral DAILY    0.9% sodium chloride infusion  75 mL/hr IntraVENous CONTINUOUS       Review of Systems:   A comprehensive review of systems was negative except for that written in the HPI. Objective:   Physical Exam:     Visit Vitals  /66 (BP 1 Location: Left upper arm, BP Patient Position: Semi fowlers)   Pulse (!) 2   Temp 98.5 °F (36.9 °C)   Resp 18   Ht 5' 3\" (1.6 m)   Wt 79.4 kg (175 lb)   SpO2 94%   BMI 31.00 kg/m²      O2 Device: None (Room air)    Temp (24hrs), Av.5 °F (36.9 °C), Min:98 °F (36.7 °C), Max:99.2 °F (37.3 °C)    No intake/output data recorded. No intake/output data recorded. General:   Awake and alert   Lungs:   Clear to auscultation bilaterally. Chest wall:  No tenderness or deformity. Heart:  Hypertensive. Regular rate and rhythm, S1, S2 normal, no murmur, click, rub or gallop. Abdomen:   Soft, non-tender. Bowel sounds normal. No masses,  No organomegaly. Extremities: Extremities normal, atraumatic, no cyanosis or edema. Pulses: 2+ and symmetric all extremities. Skin: Skin color, texture, turgor normal. No rashes or lesions   Neurologic: Facial droop on right side with drooling. Vibration and fine touch impaired in right lower extremity. Flaccid paralysis right arm and leg. Data Review:       Recent Days:  Recent Labs     23  0434 23  1059   WBC 7.0 7.5   HGB 11.1* 12.1   HCT 33.7* 36.4    356       Recent Labs     23  0434 23  1059   * 132*   K 4.2 4.6    102   CO2 22 25   * 276*   BUN 23* 26*   CREA 0.86 1.06*   CA 8.8 9.2   ALB 3.3* 3.7   TBILI 0.4 0.4   ALT 12 13   INR  --  1.0       No results for input(s): PH, PCO2, PO2, HCO3, FIO2 in the last 72 hours.     24 Hour Results:  Recent Results (from the past 24 hour(s))   EKG, 12 LEAD, INITIAL    Collection Time: 23 12:12 PM   Result Value Ref Range Ventricular Rate 89 BPM    Atrial Rate 89 BPM    P-R Interval 132 ms    QRS Duration 86 ms    Q-T Interval 374 ms    QTC Calculation (Bezet) 455 ms    Calculated P Axis 6 degrees    Calculated R Axis -20 degrees    Calculated T Axis 12 degrees    Diagnosis       Normal sinus rhythm  Normal ECG  When compared with ECG of 06-FEB-2015 10:47,  T wave amplitude has decreased in Anterior leads  Confirmed by Kianna Killian MD, Raúl Wagoner ([id number]) on 3/3/2023 12:40:50 PM     GLUCOSE, POC    Collection Time: 03/03/23  4:27 PM   Result Value Ref Range    Glucose (POC) 263 (H) 65 - 100 mg/dL    Performed by Aldo Chambers    GLUCOSE, POC    Collection Time: 03/03/23  9:28 PM   Result Value Ref Range    Glucose (POC) 198 (H) 65 - 100 mg/dL    Performed by [address] [place] [address], COMPREHENSIVE    Collection Time: 03/04/23  4:34 AM   Result Value Ref Range    Sodium 134 (L) 136 - 145 mmol/L    Potassium 4.2 3.5 - 5.1 mmol/L    Chloride 105 97 - 108 mmol/L    CO2 22 21 - 32 mmol/L    Anion gap 7 5 - 15 mmol/L    Glucose 244 (H) 65 - 100 mg/dL    BUN 23 (H) 6 - 20 mg/dL    Creatinine 0.86 0.55 - 1.02 mg/dL    BUN/Creatinine ratio 27 (H) 12 - 20      eGFR >60 >60 ml/min/1.73m2    Calcium 8.8 8.5 - 10.1 mg/dL    Bilirubin, total 0.4 0.2 - 1.0 mg/dL    AST (SGOT) 5 (L) 15 - 37 U/L    ALT (SGPT) 12 12 - 78 U/L    Alk.  phosphatase 71 45 - 117 U/L    Protein, total 6.3 (L) 6.4 - 8.2 g/dL    Albumin 3.3 (L) 3.5 - 5.0 g/dL    Globulin 3.0 2.0 - 4.0 g/dL    A-G Ratio 1.1 1.1 - 2.2     CBC WITH AUTOMATED DIFF    Collection Time: 03/04/23  4:34 AM   Result Value Ref Range    WBC 7.0 3.6 - 11.0 K/uL    RBC 3.48 (L) 3.80 - 5.20 M/uL    HGB 11.1 (L) 11.5 - 16.0 g/dL    HCT 33.7 (L) 35.0 - 47.0 %    MCV 96.8 80.0 - 99.0 FL    MCH 31.9 26.0 - 34.0 PG    MCHC 32.9 30.0 - 36.5 g/dL    RDW 13.1 11.5 - 14.5 %    PLATELET 074 132 - 855 K/uL    MPV 9.4 8.9 - 12.9 FL    NRBC 0.0 0.0  WBC    ABSOLUTE NRBC 0.00 0.00 - 0.01 K/uL    NEUTROPHILS 77 (H) 32 - 75 %    LYMPHOCYTES 13 12 - 49 %    MONOCYTES 9 5 - 13 %    EOSINOPHILS 1 0 - 7 %    BASOPHILS 0 0 - 1 %    IMMATURE GRANULOCYTES 0 0 - 0.5 %    ABS. NEUTROPHILS 5.4 1.8 - 8.0 K/UL    ABS. LYMPHOCYTES 0.9 0.8 - 3.5 K/UL    ABS. MONOCYTES 0.6 0.0 - 1.0 K/UL    ABS. EOSINOPHILS 0.1 0.0 - 0.4 K/UL    ABS. BASOPHILS 0.0 0.0 - 0.1 K/UL    ABS. IMM.  GRANS. 0.0 0.00 - 0.04 K/UL    DF AUTOMATED     TSH 3RD GENERATION    Collection Time: 03/04/23  4:34 AM   Result Value Ref Range    TSH 1.08 0.36 - 3.74 uIU/mL   GLUCOSE, POC    Collection Time: 03/04/23  7:26 AM   Result Value Ref Range    Glucose (POC) 266 (H) 65 - 100 mg/dL    Performed by Glory Garcia    ECHO ADULT COMPLETE    Collection Time: 03/04/23  8:56 AM   Result Value Ref Range    RA Area 4C 16.4 cm2    RA Volume 43 ml    AV Peak Velocity 2.0 m/s    AV Peak Gradient 16 mmHg    Aortic Root 2.9 cm    IVSd 1.3 (A) 0.6 - 0.9 cm    LVIDd 4.0 3.9 - 5.3 cm    LVIDs 2.6 cm    LVOT Peak Velocity 1.3 m/s    LVOT Peak Gradient 7 mmHg    LVPWd 1.2 (A) 0.6 - 0.9 cm    LV E' Septal Velocity 9 cm/s    LA Diameter 3.1 cm    MV E Wave Deceleration Time 92.0 ms    MV A Velocity 1.50 m/s    MV E Velocity 0.74 m/s    PV Max Velocity 1.4 m/s    PV Peak Gradient 8 mmHg    Pulm Vein A Duration 103.0 ms    Pulm Vein A Velocity 0.4 m/s    RV Basal Dimension 3.1 cm    Fractional Shortening 2D 35 28 - 44 %    LVIDd Index 2.19 cm/m2    LVIDs Index 1.42 cm/m2    LV RWT Ratio 0.60     LV Mass 2D 175.8 (A) 67 - 162 g    LV Mass 2D Index 96.1 (A) 43 - 95 g/m2    MV E/A 0.49     E/E' Septal 8.22     LA Size Index 1.69 cm/m2    LA/AO Root Ratio 1.07     RA Volume Index A4C 23 mL/m2    Ao Root Index 1.58 cm/m2    AV Velocity Ratio 0.65    DUPLEX CAROTID BILATERAL    Collection Time: 03/04/23  9:18 AM   Result Value Ref Range    Left CCA dist sys 109.0 cm/s    Left CCA dist martinez 10.7 cm/s    Left CCA prox sys 102.0 cm/s    Left CCA prox martinez 0.8 cm/s    Left ICA dist sys 88.4 cm/s    Left ICA dist martinez 10.8 cm/s    Left ICA prox sys 75.3 cm/s    Left ICA prox martinez 14.6 cm/s    Left ECA sys 199.0 cm/s    LEFT EXTERNAL CAROTID ARTERY D 11.10 cm/s    Left subclavian prox .0 cm/s    Left subclavian prox EDV 0.0 cm/s    Left vertebral sys 76.4 cm/s    LEFT VERTEBRAL ARTERY D 9.93 cm/s    Right cca dist sys 113.0 cm/s    Right CCA dist martinez 15.3 cm/s    Right CCA prox sys 135.0 cm/s    Right CCA prox martinez 14.6 cm/s    Right ICA dist sys 125.0 cm/s    Right ICA dist martinez 18.0 cm/s    Right ICA prox sys 142.0 cm/s    Right ICA prox martinez 23.2 cm/s    Right eca sys 253.0 cm/s    RIGHT EXTERNAL CAROTID ARTERY D 4.60 cm/s    Right subclavian prox .0 cm/s    Right subclavian prox EDV 0.0 cm/s    Right vertebral sys 37.4 cm/s    RIGHT VERTEBRAL ARTERY D 0.00 cm/s    Left arm  mmHg    Right ICA/CCA sys 1.26     Left ICA/CCA sys 0.69    GLUCOSE, POC    Collection Time: 03/04/23  9:50 AM   Result Value Ref Range    Glucose (POC) 276 (H) 65 - 100 mg/dL    Performed by Ammy Sorto        DUPLEX CAROTID BILATERAL         MRI BRAIN WO CONT   Final Result      1. Multiple acute to subacute infarcts in the left cerebral hemisphere   predominantly within the left frontoparietal deep white matter, and in a pattern   suggestive of watershed infarction. 2. Minimal chronic microvascular ischemic disease. Chronic infarcts in the   bilateral basal ganglia, left larger than right, and left cerebellum. XR CHEST PORT   Final Result   No acute process identified. CTA CODE NEURO HEAD AND NECK W CONT   Final Result   CTA Head:   1. No evidence of flow-limiting stenosis or aneurysm. Scattered atherosclerotic   disease as above, including severe stenosis in the right P2 segment, which is   unchanged. CTA Neck:   1. No evidence of flow-limiting stenosis.    2. Evaluation of the carotid bifurcations is limited by motion, though there are   suspected moderate stenoses of the bilateral proximal internal carotid arteries,   right worse than left. CT CODE NEURO HEAD WO CONTRAST   Final Result   Chronic ischemic changes. No acute findings. Assessment:    Acute ischemic stroke left frontal parietal hemisphere and a watershed distribution  -MRI suggests acute to subacute infarcts in the left cerebral hemisphere predominantly within the left frontoparietal deep white matter    HTN    Type 2 DM    Previous history of CVA without residual symptoms    Poor compliance    Hyponatremia      Discussion/MDM: Patient with multiple medical comorbidities, each with high likelihood for morbidity and mortality if left untreated. I have reviewed patient's presenting subjective and objective findings, as well as all laboratory studies, imaging studies, and vital signs to date as well as treatment rendered and patient's response to those treatments. In addition, prior medical, surgical and relevant social and family histories were reviewed. Plan:  Consider placement of NG tube due to inability to swallow po medication  Awaiting carotid duplex results  Keep n.p.o.    I will request telemetry neuro follow-up    Place NG tube for medication administration and tube feeds    I spoke with patient's sister and updated her on condition. She indicates patient has a son, Summer Padron who will be primary decision maker. His cell phone number is [phone]. He lives in [facility]. However, several attempts to contact him were unsuccessful although I did leave a message    Prognosis appears guarded      Care Plan discussed with: Patient/Family    Code status: full code    Social determinants of health: none known    Disposition/barriers to discharge: continue inpatient care    Total time spent with patient: 30 minutes.     Shirley Munguia MD

## 2023-03-04 NOTE — PROGRESS NOTES
Pt unable to state birth year correctly and not able to state current location during neuro check. These are new findings. Finding reported to Attending. No new orders at this time. Vitals are stable at this time. Will continue to monitor patient closely. Reviewed June echo results with patient. EF is improved to 55-60% and his TR is improved as well. Made him an appointment to return to clinic next month to meet the new MDs

## 2023-03-04 NOTE — PROGRESS NOTES
Hospitalist Progress Note               Daily Progress Note: 3/4/2023      Subjective:   Hospital course to date:    Patient is a 77 yo female with a PMH of type 2 DM, HTN, previous smoker, and CVA that occurred in 2021 without residual symptoms who was admitted from the ED on 3/3 for slurred speech and and right-sided weakness. Exam shows right upper extremity drift and expressive aphasia. MRI of the head without contrast showed acute to subacute infarcts in the left cerebral hemisphere within the left frontoparietal deep white matter, and in a pattern suggestive of watershed infarction. Labs on admission include Sodium 132, Glucose 276, Creatinine 1.06.     --------  Patient is seen today for follow-up. BP is elevated at 126/58 and with low-grade fever at 99.2 degrees F. Patient has slurred speech and has difficulty answering questions. History was received also from 1-to-1. Awaiting carotid duplex US and Echo result. Neurology consulted and concluded the patient should continue stroke woke-up and order modified barium swallow and swallowing screen. Speech pathology consulted and stated the patient cannot tolerate po medication    Labs today show sodium 134    Confirms slight numbness in right side of body and difficulty speaking. Confirms numbness in lower extremity and distal right upper extremity.       Problem List:  Problem List as of 3/4/2023 Date Reviewed: 3/3/2023            Codes Class Noted - Resolved    CVA (cerebral vascular accident) [facility]) ICD-10-CM: I63.9  ICD-9-CM: 434.91  3/3/2023 - Present        Acute right-sided weakness ICD-10-CM: R53.1  ICD-9-CM: 728.87  3/3/2023 - Present        Osteoporosis ICD-10-CM: M81.0  ICD-9-CM: 733.00  12/17/2022 - Present        Vitamin D deficiency ICD-10-CM: E55.9  ICD-9-CM: 268.9  12/17/2022 - Present        History of colon polyps ICD-10-CM: Z86.010  ICD-9-CM: V12.72  12/17/2022 - Present        Macrocytosis without anemia ICD-10-CM: D75.89  ICD-9-CM: 289.89  12/17/2022 - Present        Diabetes mellitus ([facility] [address] ICD-10-CM: E11.9  ICD-9-CM: 250.00  3/11/2022 - Present        Benign essential HTN ICD-10-CM: I10  ICD-9-CM: 401.1  3/11/2022 - Present        Mixed hyperlipidemia ICD-10-CM: E78.2  ICD-9-CM: 272.2  3/11/2022 - Present        History of CVA (cerebrovascular accident) ICD-10-CM: Z86.73  ICD-9-CM: V12.54  3/11/2022 - Present        TIA (transient ischemic attack) ICD-10-CM: G45.9  ICD-9-CM: 435.9  12/8/2021 - Present        Slurred speech ICD-10-CM: R47.81  ICD-9-CM: 784.59  12/8/2021 - Present           Medications reviewed  Current Facility-Administered Medications   Medication Dose Route Frequency    enoxaparin (LOVENOX) injection 40 mg  40 mg SubCUTAneous Q24H    aspirin chewable tablet 81 mg  81 mg Oral DAILY    clopidogreL (PLAVIX) tablet 75 mg  75 mg Oral DAILY    cyanocobalamin tablet 1,000 mcg  1,000 mcg Oral DAILY    [Held by provider] insulin glargine (LANTUS) injection 14 Units  14 Units SubCUTAneous QHS    insulin lispro (HUMALOG) injection   SubCUTAneous AC&HS    glucose chewable tablet 16 g  4 Tablet Oral PRN    glucagon (GLUCAGEN) injection 1 mg  1 mg IntraMUSCular PRN    dextrose 10% infusion 0-250 mL  0-250 mL IntraVENous PRN    sodium chloride (NS) flush 5-40 mL  5-40 mL IntraVENous Q8H    sodium chloride (NS) flush 5-40 mL  5-40 mL IntraVENous PRN    acetaminophen (TYLENOL) tablet 650 mg  650 mg Oral Q6H PRN    Or    acetaminophen (TYLENOL) suppository 650 mg  650 mg Rectal Q6H PRN    polyethylene glycol (MIRALAX) packet 17 g  17 g Oral DAILY PRN    ondansetron (ZOFRAN ODT) tablet 4 mg  4 mg Oral Q8H PRN    Or    ondansetron (ZOFRAN) injection 4 mg  4 mg IntraVENous Q6H PRN    atorvastatin (LIPITOR) tablet 80 mg  80 mg Oral QHS    [Held by provider] alogliptin (NESINA) tablet 12.5 mg  12.5 mg Oral DAILY    And    [Held by provider] metFORMIN (GLUCOPHAGE) tablet 1,000 mg  1,000 mg Oral BID WITH MEALS    amLODIPine (NORVASC) tablet 10 mg  10 mg Oral DAILY    And    lisinopriL (PRINIVIL, ZESTRIL) tablet 40 mg  40 mg Oral DAILY    0.9% sodium chloride infusion  75 mL/hr IntraVENous CONTINUOUS       Review of Systems:   A comprehensive review of systems was negative except for that written in the HPI. Objective:   Physical Exam:     Visit Vitals  BP (!) 126/58 (BP 1 Location: Left lower arm, BP Patient Position: Semi fowlers)   Pulse 94   Temp 99.2 °F (37.3 °C)   Resp 16   Ht 5' 3\" (1.6 m)   Wt 79.4 kg (175 lb)   SpO2 96%   BMI 31.00 kg/m²      O2 Device: None (Room air)    Temp (24hrs), Av.5 °F (36.9 °C), Min:98.1 °F (36.7 °C), Max:99.2 °F (37.3 °C)    No intake/output data recorded. No intake/output data recorded. General:   Awake and alert   Lungs:   Clear to auscultation bilaterally. Chest wall:  No tenderness or deformity. Heart:  Hypertensive. Regular rate and rhythm, S1, S2 normal, no murmur, click, rub or gallop. Abdomen:   Soft, non-tender. Bowel sounds normal. No masses,  No organomegaly. Extremities: Extremities normal, atraumatic, no cyanosis or edema. Pulses: 2+ and symmetric all extremities. Skin: Skin color, texture, turgor normal. No rashes or lesions   Neurologic: Facial droop on right side with drooling. Vibration and fine touch impaired in right lower extremity. Strength 1/5 on right lower extremity and 2/5 in left lower extremity in plantarflexion. Data Review:       Recent Days:  Recent Labs     23  0434 23  1059   WBC 7.0 7.5   HGB 11.1* 12.1   HCT 33.7* 36.4    356     Recent Labs     23  0434 23  1059   * 132*   K 4.2 4.6    102   CO2 22 25   * 276*   BUN 23* 26*   CREA 0.86 1.06*   CA 8.8 9.2   ALB 3.3* 3.7   TBILI 0.4 0.4   ALT 12 13   INR  --  1.0     No results for input(s): PH, PCO2, PO2, HCO3, FIO2 in the last 72 hours.     24 Hour Results:  Recent Results (from the past 24 hour(s))   CBC WITH AUTOMATED DIFF Collection Time: 03/03/23 10:59 AM   Result Value Ref Range    WBC 7.5 3.6 - 11.0 K/uL    RBC 3.72 (L) 3.80 - 5.20 M/uL    HGB 12.1 11.5 - 16.0 g/dL    HCT 36.4 35.0 - 47.0 %    MCV 97.8 80.0 - 99.0 FL    MCH 32.5 26.0 - 34.0 PG    MCHC 33.2 30.0 - 36.5 g/dL    RDW 13.0 11.5 - 14.5 %    PLATELET 702 586 - 304 K/uL    MPV 9.5 8.9 - 12.9 FL    NRBC 0.0 0.0  WBC    ABSOLUTE NRBC 0.00 0.00 - 0.01 K/uL    NEUTROPHILS 76 (H) 32 - 75 %    LYMPHOCYTES 13 12 - 49 %    MONOCYTES 7 5 - 13 %    EOSINOPHILS 4 0 - 7 %    BASOPHILS 0 0 - 1 %    IMMATURE GRANULOCYTES 0 0 - 0.5 %    ABS. NEUTROPHILS 5.7 1.8 - 8.0 K/UL    ABS. LYMPHOCYTES 1.0 0.8 - 3.5 K/UL    ABS. MONOCYTES 0.5 0.0 - 1.0 K/UL    ABS. EOSINOPHILS 0.3 0.0 - 0.4 K/UL    ABS. BASOPHILS 0.0 0.0 - 0.1 K/UL    ABS. IMM. GRANS. 0.0 0.00 - 0.04 K/UL    DF AUTOMATED     METABOLIC PANEL, COMPREHENSIVE    Collection Time: 03/03/23 10:59 AM   Result Value Ref Range    Sodium 132 (L) 136 - 145 mmol/L    Potassium 4.6 3.5 - 5.1 mmol/L    Chloride 102 97 - 108 mmol/L    CO2 25 21 - 32 mmol/L    Anion gap 5 5 - 15 mmol/L    Glucose 276 (H) 65 - 100 mg/dL    BUN 26 (H) 6 - 20 mg/dL    Creatinine 1.06 (H) 0.55 - 1.02 mg/dL    BUN/Creatinine ratio 25 (H) 12 - 20      eGFR 55 (L) >60 ml/min/1.73m2    Calcium 9.2 8.5 - 10.1 mg/dL    Bilirubin, total 0.4 0.2 - 1.0 mg/dL    AST (SGOT) 6 (L) 15 - 37 U/L    ALT (SGPT) 13 12 - 78 U/L    Alk.  phosphatase 77 45 - 117 U/L    Protein, total 6.9 6.4 - 8.2 g/dL    Albumin 3.7 3.5 - 5.0 g/dL    Globulin 3.2 2.0 - 4.0 g/dL    A-G Ratio 1.2 1.1 - 2.2     PROTHROMBIN TIME + INR    Collection Time: 03/03/23 10:59 AM   Result Value Ref Range    Prothrombin time 13.1 11.9 - 14.6 sec    INR 1.0 0.9 - 1.1     TROPONIN-HIGH SENSITIVITY    Collection Time: 03/03/23 10:59 AM   Result Value Ref Range    Troponin-High Sensitivity 4 0 - 51 ng/L   EKG, 12 LEAD, INITIAL    Collection Time: 03/03/23 12:12 PM   Result Value Ref Range    Ventricular Rate 89 BPM Atrial Rate 89 BPM    P-R Interval 132 ms    QRS Duration 86 ms    Q-T Interval 374 ms    QTC Calculation (Bezet) 455 ms    Calculated P Axis 6 degrees    Calculated R Axis -20 degrees    Calculated T Axis 12 degrees    Diagnosis       Normal sinus rhythm  Normal ECG  When compared with ECG of 06-FEB-2015 10:47,  T wave amplitude has decreased in Anterior leads  Confirmed by Morgan Hatchet MD, Antonette Granados ([id number]) on 3/3/2023 12:40:50 PM     GLUCOSE, POC    Collection Time: 03/03/23  4:27 PM   Result Value Ref Range    Glucose (POC) 263 (H) 65 - 100 mg/dL    Performed by [address] [employer], POC    Collection Time: 03/03/23  9:28 PM   Result Value Ref Range    Glucose (POC) 198 (H) 65 - 100 mg/dL    Performed by [address] [place] [address], COMPREHENSIVE    Collection Time: 03/04/23  4:34 AM   Result Value Ref Range    Sodium 134 (L) 136 - 145 mmol/L    Potassium 4.2 3.5 - 5.1 mmol/L    Chloride 105 97 - 108 mmol/L    CO2 22 21 - 32 mmol/L    Anion gap 7 5 - 15 mmol/L    Glucose 244 (H) 65 - 100 mg/dL    BUN 23 (H) 6 - 20 mg/dL    Creatinine 0.86 0.55 - 1.02 mg/dL    BUN/Creatinine ratio 27 (H) 12 - 20      eGFR >60 >60 ml/min/1.73m2    Calcium 8.8 8.5 - 10.1 mg/dL    Bilirubin, total 0.4 0.2 - 1.0 mg/dL    AST (SGOT) 5 (L) 15 - 37 U/L    ALT (SGPT) 12 12 - 78 U/L    Alk.  phosphatase 71 45 - 117 U/L    Protein, total 6.3 (L) 6.4 - 8.2 g/dL    Albumin 3.3 (L) 3.5 - 5.0 g/dL    Globulin 3.0 2.0 - 4.0 g/dL    A-G Ratio 1.1 1.1 - 2.2     CBC WITH AUTOMATED DIFF    Collection Time: 03/04/23  4:34 AM   Result Value Ref Range    WBC 7.0 3.6 - 11.0 K/uL    RBC 3.48 (L) 3.80 - 5.20 M/uL    HGB 11.1 (L) 11.5 - 16.0 g/dL    HCT 33.7 (L) 35.0 - 47.0 %    MCV 96.8 80.0 - 99.0 FL    MCH 31.9 26.0 - 34.0 PG    MCHC 32.9 30.0 - 36.5 g/dL    RDW 13.1 11.5 - 14.5 %    PLATELET 285 556 - 557 K/uL    MPV 9.4 8.9 - 12.9 FL    NRBC 0.0 0.0  WBC    ABSOLUTE NRBC 0.00 0.00 - 0.01 K/uL    NEUTROPHILS 77 (H) 32 - 75 % LYMPHOCYTES 13 12 - 49 %    MONOCYTES 9 5 - 13 %    EOSINOPHILS 1 0 - 7 %    BASOPHILS 0 0 - 1 %    IMMATURE GRANULOCYTES 0 0 - 0.5 %    ABS. NEUTROPHILS 5.4 1.8 - 8.0 K/UL    ABS. LYMPHOCYTES 0.9 0.8 - 3.5 K/UL    ABS. MONOCYTES 0.6 0.0 - 1.0 K/UL    ABS. EOSINOPHILS 0.1 0.0 - 0.4 K/UL    ABS. BASOPHILS 0.0 0.0 - 0.1 K/UL    ABS. IMM. GRANS. 0.0 0.00 - 0.04 K/UL    DF AUTOMATED     TSH 3RD GENERATION    Collection Time: 03/04/23  4:34 AM   Result Value Ref Range    TSH 1.08 0.36 - 3.74 uIU/mL   GLUCOSE, POC    Collection Time: 03/04/23  7:26 AM   Result Value Ref Range    Glucose (POC) 266 (H) 65 - 100 mg/dL    Performed by Aleja Ardon        MRI BRAIN WO CONT   Final Result      1. Multiple acute to subacute infarcts in the left cerebral hemisphere   predominantly within the left frontoparietal deep white matter, and in a pattern   suggestive of watershed infarction. 2. Minimal chronic microvascular ischemic disease. Chronic infarcts in the   bilateral basal ganglia, left larger than right, and left cerebellum. XR CHEST PORT   Final Result   No acute process identified. CTA CODE NEURO HEAD AND NECK W CONT   Final Result   CTA Head:   1. No evidence of flow-limiting stenosis or aneurysm. Scattered atherosclerotic   disease as above, including severe stenosis in the right P2 segment, which is   unchanged. CTA Neck:   1. No evidence of flow-limiting stenosis. 2. Evaluation of the carotid bifurcations is limited by motion, though there are   suspected moderate stenoses of the bilateral proximal internal carotid arteries,   right worse than left. CT CODE NEURO HEAD WO CONTRAST   Final Result   Chronic ischemic changes. No acute findings.             DUPLEX CAROTID BILATERAL    (Results Pending)        Assessment:    CVA and right-sided weakness  -MRI suggests acute to subacute infarcts in the left cerebral hemisphere predominantly within the left frontoparietal deep white matter    HTN    Type 2 DM    Previous history of CVA without residual symptoms    Poor compliance    Hyponatremia      Discussion/MDM: Patient with multiple medical comorbidities, each with high likelihood for morbidity and mortality if left untreated. I have reviewed patient's presenting subjective and objective findings, as well as all laboratory studies, imaging studies, and vital signs to date as well as treatment rendered and patient's response to those treatments. In addition, prior medical, surgical and relevant social and family histories were reviewed. Plan:  Consider placement of NG tube due to inability to swallow po medication  Awaiting carotid duplex results  Awaiting echo results  Awaiting barium swallow results  Consult neurology      Care Plan discussed with: Patient/Family    Code status: full code    Social determinants of health: none known    Disposition/barriers to discharge: continue inpatient care    Total time spent with patient: 30 minutes. Brooke Solano   *ATTENTION:  This note has been created by a medical student for educational purposes only. Please do not refer to the content of this note for clinical decision-making, billing, or other purposes. Please see attending physicians note to obtain clinical information on this patient. *

## 2023-03-04 NOTE — PROGRESS NOTES
Problem: Falls - Risk of  Goal: *Absence of Falls  Description: Document Scott Khan Fall Risk and appropriate interventions in the flowsheet.   Outcome: Progressing Towards Goal  Note: Fall Risk Interventions:                                Problem: TIA/CVA Stroke: 0-24 hours  Goal: Off Pathway (Use only if patient is Off Pathway)  Outcome: Progressing Towards Goal  Goal: Activity/Safety  Outcome: Progressing Towards Goal  Goal: Consults, if ordered  Outcome: Progressing Towards Goal  Goal: Diagnostic Test/Procedures  Outcome: Progressing Towards Goal  Goal: Nutrition/Diet  Outcome: Progressing Towards Goal  Goal: Medications  Outcome: Progressing Towards Goal  Goal: Respiratory  Outcome: Progressing Towards Goal  Goal: Treatments/Interventions/Procedures  Outcome: Progressing Towards Goal  Goal: Minimize risk of bleeding post-thrombolytic infusion  Outcome: Progressing Towards Goal

## 2023-03-05 LAB
CHOLEST SERPL-MCNC: 190 MG/DL
EST. AVERAGE GLUCOSE BLD GHB EST-MCNC: 249 MG/DL
GLUCOSE BLD STRIP.AUTO-MCNC: 245 MG/DL (ref 65–100)
GLUCOSE BLD STRIP.AUTO-MCNC: 255 MG/DL (ref 65–100)
GLUCOSE BLD STRIP.AUTO-MCNC: 278 MG/DL (ref 65–100)
GLUCOSE BLD STRIP.AUTO-MCNC: 293 MG/DL (ref 65–100)
HBA1C MFR BLD: 10.3 % (ref 4–5.6)
HDLC SERPL-MCNC: 74 MG/DL
HDLC SERPL: 2.6 (ref 0–5)
LDLC SERPL CALC-MCNC: 70.6 MG/DL (ref 0–100)
LEFT ARM BP: 148 MMHG
LEFT CCA DIST DIAS: 10.7 CM/S
LEFT CCA DIST SYS: 109 CM/S
LEFT CCA PROX DIAS: 0.8 CM/S
LEFT CCA PROX SYS: 102 CM/S
LEFT ECA DIAS: 11.1 CM/S
LEFT ECA SYS: 199 CM/S
LEFT ICA DIST DIAS: 10.8 CM/S
LEFT ICA DIST SYS: 88.4 CM/S
LEFT ICA PROX DIAS: 14.6 CM/S
LEFT ICA PROX SYS: 75.3 CM/S
LEFT ICA/CCA SYS: 0.69
LEFT VERTEBRAL DIAS: 9.93 CM/S
LEFT VERTEBRAL SYS: 76.4 CM/S
LIPID PROFILE,FLP: ABNORMAL
PERFORMED BY, TECHID: ABNORMAL
RIGHT CCA DIST DIAS: 15.3 CM/S
RIGHT CCA DIST SYS: 113 CM/S
RIGHT CCA PROX DIAS: 14.6 CM/S
RIGHT CCA PROX SYS: 135 CM/S
RIGHT ECA DIAS: 4.6 CM/S
RIGHT ECA SYS: 253 CM/S
RIGHT ICA DIST DIAS: 18 CM/S
RIGHT ICA DIST SYS: 125 CM/S
RIGHT ICA PROX DIAS: 23.2 CM/S
RIGHT ICA PROX SYS: 142 CM/S
RIGHT ICA/CCA SYS: 1.26
RIGHT VERTEBRAL DIAS: 0 CM/S
RIGHT VERTEBRAL SYS: 37.4 CM/S
TRIGL SERPL-MCNC: 227 MG/DL (ref ?–150)
VAS LEFT SUBCLAVIAN PROX EDV: 0 CM/S
VAS LEFT SUBCLAVIAN PROX PSV: 173 CM/S
VAS RIGHT SUBCLAVIAN PROX EDV: 0 CM/S
VAS RIGHT SUBCLAVIAN PROX PSV: 148 CM/S
VLDLC SERPL CALC-MCNC: 45.4 MG/DL

## 2023-03-05 PROCEDURE — 97530 THERAPEUTIC ACTIVITIES: CPT

## 2023-03-05 PROCEDURE — 74011250636 HC RX REV CODE- 250/636: Performed by: INTERNAL MEDICINE

## 2023-03-05 PROCEDURE — 82962 GLUCOSE BLOOD TEST: CPT

## 2023-03-05 PROCEDURE — 74011636637 HC RX REV CODE- 636/637: Performed by: NURSE PRACTITIONER

## 2023-03-05 PROCEDURE — 74011250636 HC RX REV CODE- 250/636: Performed by: NURSE PRACTITIONER

## 2023-03-05 PROCEDURE — 65270000029 HC RM PRIVATE

## 2023-03-05 PROCEDURE — 97166 OT EVAL MOD COMPLEX 45 MIN: CPT

## 2023-03-05 PROCEDURE — 92526 ORAL FUNCTION THERAPY: CPT

## 2023-03-05 PROCEDURE — 97165 OT EVAL LOW COMPLEX 30 MIN: CPT

## 2023-03-05 PROCEDURE — 74011000250 HC RX REV CODE- 250: Performed by: NURSE PRACTITIONER

## 2023-03-05 PROCEDURE — 74011250637 HC RX REV CODE- 250/637: Performed by: NURSE PRACTITIONER

## 2023-03-05 RX ORDER — OLANZAPINE 10 MG/1
2.5 INJECTION, POWDER, LYOPHILIZED, FOR SOLUTION INTRAMUSCULAR ONCE
Status: DISCONTINUED | OUTPATIENT
Start: 2023-03-05 | End: 2023-03-05

## 2023-03-05 RX ADMIN — INSULIN LISPRO 9 UNITS: 100 INJECTION, SOLUTION INTRAVENOUS; SUBCUTANEOUS at 21:53

## 2023-03-05 RX ADMIN — INSULIN LISPRO 9 UNITS: 100 INJECTION, SOLUTION INTRAVENOUS; SUBCUTANEOUS at 09:28

## 2023-03-05 RX ADMIN — SODIUM CHLORIDE, PRESERVATIVE FREE 10 ML: 5 INJECTION INTRAVENOUS at 06:25

## 2023-03-05 RX ADMIN — CYANOCOBALAMIN TAB 1000 MCG 1000 MCG: 1000 TAB at 09:21

## 2023-03-05 RX ADMIN — ATORVASTATIN CALCIUM 80 MG: 40 TABLET, FILM COATED ORAL at 21:53

## 2023-03-05 RX ADMIN — INSULIN LISPRO 6 UNITS: 100 INJECTION, SOLUTION INTRAVENOUS; SUBCUTANEOUS at 16:31

## 2023-03-05 RX ADMIN — ACETAMINOPHEN 650 MG: 325 TABLET ORAL at 22:00

## 2023-03-05 RX ADMIN — ASPIRIN 81 MG CHEWABLE TABLET 81 MG: 81 TABLET CHEWABLE at 09:20

## 2023-03-05 RX ADMIN — AMLODIPINE BESYLATE 10 MG: 5 TABLET ORAL at 09:21

## 2023-03-05 RX ADMIN — INSULIN LISPRO 9 UNITS: 100 INJECTION, SOLUTION INTRAVENOUS; SUBCUTANEOUS at 11:35

## 2023-03-05 RX ADMIN — ENOXAPARIN SODIUM 40 MG: 100 INJECTION SUBCUTANEOUS at 09:21

## 2023-03-05 RX ADMIN — SODIUM CHLORIDE 75 ML/HR: 9 INJECTION, SOLUTION INTRAVENOUS at 11:35

## 2023-03-05 RX ADMIN — CLOPIDOGREL BISULFATE 75 MG: 75 TABLET ORAL at 09:21

## 2023-03-05 RX ADMIN — SODIUM CHLORIDE, PRESERVATIVE FREE 10 ML: 5 INJECTION INTRAVENOUS at 13:31

## 2023-03-05 RX ADMIN — LISINOPRIL 40 MG: 40 TABLET ORAL at 09:21

## 2023-03-05 RX ADMIN — SODIUM CHLORIDE, PRESERVATIVE FREE 10 ML: 5 INJECTION INTRAVENOUS at 21:53

## 2023-03-05 NOTE — PROGRESS NOTES
Problem: Pressure Injury - Risk of  Goal: *Prevention of pressure injury  Description: Document Nitin Scale and appropriate interventions in the flowsheet.   3/5/2023 0158 by Kylie Martinez RN  Outcome: Progressing Towards Goal  Note: Pressure Injury Interventions:  Sensory Interventions: Assess changes in LOC, Minimize linen layers, Keep linens dry and wrinkle-free    Moisture Interventions: Minimize layers    Activity Interventions: PT/OT evaluation    Mobility Interventions: HOB 30 degrees or less    Nutrition Interventions: Document food/fluid/supplement intake    Friction and Shear Interventions: Apply protective barrier, creams and emollients, HOB 30 degrees or less             3/5/2023 0157 by Kylie Martinez RN  Outcome: Progressing Towards Goal  Note: Pressure Injury Interventions:  Sensory Interventions: Assess changes in LOC, Minimize linen layers, Keep linens dry and wrinkle-free    Moisture Interventions: Minimize layers    Activity Interventions: PT/OT evaluation    Mobility Interventions: HOB 30 degrees or less    Nutrition Interventions: Document food/fluid/supplement intake    Friction and Shear Interventions: Apply protective barrier, creams and emollients, HOB 30 degrees or less                Problem: TIA/CVA Stroke: 0-24 hours  Goal: *Dysphagia screen performed(Stroke Metric)  3/5/2023 0158 by Kylie Martinez RN  Outcome: Progressing Towards Goal  3/5/2023 0157 by Kylie Martinez RN  Outcome: Progressing Towards Goal     Problem: TIA/CVA Stroke: 0-24 hours  Goal: *Stroke education started(Stroke Metric)  3/5/2023 0158 by Kylie Martinez RN  Outcome: Progressing Towards Goal  3/5/2023 0157 by Kylie Martinez RN  Outcome: Progressing Towards Goal     Problem: TIA/CVA Stroke: 0-24 hours  Goal: *Ability to perform ADLs and demonstrates progressive mobility and function  3/5/2023 0158 by Kylie Martinez RN  Outcome: Progressing Towards Goal  3/5/2023 0157 by Kylie Martinez RN  Outcome: Progressing Towards Goal     Problem: Patient Education: Go to Patient Education Activity  Goal: Patient/Family Education  Description: Patient to participate in automatic speech tasks: count 1-10 with 60% accuracy  Patient to name/label environmental items with 60% accuracy. Patient to ID from a F:2 with 60% accuracy. Patient to follow 1 step commands with 60% accuracy. Patient orientation x4 with external aids.    3/5/2023 0158 by Guanaco Caputo RN  Outcome: Progressing Towards Goal  3/5/2023 0157 by Guanaco Caputo RN  Outcome: Progressing Towards Goal

## 2023-03-05 NOTE — PROGRESS NOTES
Problem: Pressure Injury - Risk of  Goal: *Prevention of pressure injury  Description: Document Nitin Scale and appropriate interventions in the flowsheet. Outcome: Progressing Towards Goal  Note: Pressure Injury Interventions:  Sensory Interventions: Turn and reposition approx. every two hours (pillows and wedges if needed)    Moisture Interventions: Internal/External urinary devices, Absorbent underpads, Apply protective barrier, creams and emollients    Activity Interventions: PT/OT evaluation    Mobility Interventions: HOB 30 degrees or less    Nutrition Interventions: Document food/fluid/supplement intake    Friction and Shear Interventions: HOB 30 degrees or less                Problem: Falls - Risk of  Goal: *Absence of Falls  Description: Document Jennifer Fall Risk and appropriate interventions in the flowsheet.   Outcome: Progressing Towards Goal  Note: Fall Risk Interventions:       Mentation Interventions: Bed/chair exit alarm, Increase mobility         Elimination Interventions: Bed/chair exit alarm              Problem: Delirium Treatment  Goal: *Emotional stability restored to baseline  Outcome: Progressing Towards Goal  Goal: *Absence of falls  Outcome: Progressing Towards Goal

## 2023-03-05 NOTE — PROGRESS NOTES
Hospitalist Progress Note               Daily Progress Note: 3/5/2023      Subjective:   Hospital course to date:    Patient is a 77 yo female with a PMH of type 2 DM, HTN, previous smoker, and CVA that occurred in 2021 without residual symptoms who was admitted from the ED on 3/3 for slurred speech and and right-sided weakness. Exam shows right upper extremity drift and expressive aphasia. MRI of the head without contrast showed acute to subacute infarcts in the left cerebral hemisphere within the left frontoparietal deep white matter, and in a pattern suggestive of watershed infarction. Labs on admission include Sodium 132, Glucose 276, Creatinine 1.06. On 3/4, patient is seen for follow-up. BP is elevated at 126/58 and with low-grade fever at 99.2 degrees F. Patient has slurred speech and has difficulty answering questions. History was received also from 1-to-1. Awaiting carotid duplex US and Echo result. Neurology consulted and concluded the patient should continue stroke woke-up and order modified barium swallow and swallowing screen. Speech pathology consulted and stated the patient cannot tolerate po medication    Labs today show sodium 134    Confirms slight numbness in right side of body and difficulty speaking. Confirms numbness in lower extremity and distal right upper extremity.    ---------------  Patient is seen for follow-up. NG tube has been placed yesterday. Patient is aware son is visiting her today. No acute complaints at this time. History was received also from 1-to-1. Confirms slight numbness in right side of body and difficulty speaking. Confirms numbness in lower extremity and distal right upper extremity. Echo results are consistent with EF of 65-70% with abnormal diastolic function. No new labs received today. I did finally get a hold of her son last night and updated him.   He requested DO NOT RESUSCITATE CODE STATUS      Problem List:  Problem List as of 3/5/2023 Date Reviewed: 3/3/2023            Codes Class Noted - Resolved    CVA (cerebral vascular accident) [facility]) ICD-10-CM: I63.9  ICD-9-CM: 434.91  3/3/2023 - Present        Acute right-sided weakness ICD-10-CM: R53.1  ICD-9-CM: 728.87  3/3/2023 - Present        Osteoporosis ICD-10-CM: M81.0  ICD-9-CM: 733.00  12/17/2022 - Present        Vitamin D deficiency ICD-10-CM: E55.9  ICD-9-CM: 268.9  12/17/2022 - Present        History of colon polyps ICD-10-CM: Z86.010  ICD-9-CM: V12.72  12/17/2022 - Present        Macrocytosis without anemia ICD-10-CM: D75.89  ICD-9-CM: 289.89  12/17/2022 - Present        Di[facility] ([facility] ICD-10-CM: E11.9  ICD-9-CM: 250.00  3/11/2022 - Present        Benign essential HTN ICD-10-CM: I10  ICD-9-CM: 401.1  3/11/2022 - Present        Mixed hyperlipidemia ICD-10-CM: E78.2  ICD-9-CM: 272.2  3/11/2022 - Present        History of CVA (cerebrovascular accident) ICD-10-CM: Z86.73  ICD-9-CM: V12.54  3/11/2022 - Present        TIA (transient ischemic attack) ICD-10-CM: G45.9  ICD-9-CM: 435.9  12/8/2021 - Present        Slurred speech ICD-10-CM: R47.81  ICD-9-CM: 784.59  12/8/2021 - Present       Medications reviewed  Current Facility-Administered Medications   Medication Dose Route Frequency    enoxaparin (LOVENOX) injection 40 mg  40 mg SubCUTAneous Q24H    aspirin chewable tablet 81 mg  81 mg Oral DAILY    clopidogreL (PLAVIX) tablet 75 mg  75 mg Oral DAILY    cyanocobalamin tablet 1,000 mcg  1,000 mcg Oral DAILY    [Held by provider] insulin glargine (LANTUS) injection 14 Units  14 Units SubCUTAneous QHS    insulin lispro (HUMALOG) injection   SubCUTAneous AC&HS    glucose chewable tablet 16 g  4 Tablet Oral PRN    glucagon (GLUCAGEN) injection 1 mg  1 mg IntraMUSCular PRN    dextrose 10% infusion 0-250 mL  0-250 mL IntraVENous PRN    sodium chloride (NS) flush 5-40 mL  5-40 mL IntraVENous Q8H    sodium chloride (NS) flush 5-40 mL  5-40 mL IntraVENous PRN    acetaminophen (TYLENOL) tablet 650 mg  650 mg Oral Q6H PRN    Or    acetaminophen (TYLENOL) suppository 650 mg  650 mg Rectal Q6H PRN    polyethylene glycol (MIRALAX) packet 17 g  17 g Oral DAILY PRN    ondansetron (ZOFRAN ODT) tablet 4 mg  4 mg Oral Q8H PRN    Or    ondansetron (ZOFRAN) injection 4 mg  4 mg IntraVENous Q6H PRN    atorvastatin (LIPITOR) tablet 80 mg  80 mg Oral QHS    [Held by provider] alogliptin (NESINA) tablet 12.5 mg  12.5 mg Oral DAILY    And    [Held by provider] metFORMIN (GLUCOPHAGE) tablet 1,000 mg  1,000 mg Oral BID WITH MEALS    amLODIPine (NORVASC) tablet 10 mg  10 mg Oral DAILY    And    lisinopriL (PRINIVIL, ZESTRIL) tablet 40 mg  40 mg Oral DAILY    0.9% sodium chloride infusion  75 mL/hr IntraVENous CONTINUOUS       Review of Systems:   A comprehensive review of systems was negative except for that written in the HPI. Objective:   Physical Exam:     Visit Vitals  BP (!) 142/63 (BP 1 Location: Left upper arm, BP Patient Position: Semi fowlers)   Pulse (!) 102   Temp 99.1 °F (37.3 °C)   Resp 17   Ht 5' 3\" (1.6 m)   Wt 79.4 kg (175 lb)   SpO2 98%   BMI 31.00 kg/m²      O2 Device: None (Room air)    Temp (24hrs), Av.5 °F (36.9 °C), Min:97.4 °F (36.3 °C), Max:99.1 °F (37.3 °C)    701 -  190  In: 150   Out: -    1901 -  07  In: -   Out: 400 [Urine:400]    General:   Awake and alert   Lungs:   Clear to auscultation bilaterally. Chest wall:  No tenderness or deformity. Heart:  Hypertensive. Regular rate and rhythm, S1, S2 normal, no murmur, click, rub or gallop. Abdomen:   Soft, non-tender. Bowel sounds normal. No masses,  No organomegaly. Extremities: Extremities normal, atraumatic, no cyanosis or edema. Pulses: 2+ and symmetric all extremities. Skin: Skin color, texture, turgor normal. No rashes or lesions   Neurologic: Facial droop on right side with drooling. Vibration and fine touch impaired in right lower extremity. Flaccid paralysis right arm and leg.  Unable to indicate person, place, or time. Data Review:       Recent Days:  Recent Labs     03/04/23  0434 03/03/23  1059   WBC 7.0 7.5   HGB 11.1* 12.1   HCT 33.7* 36.4    356       Recent Labs     03/04/23  0434 03/03/23  1059   * 132*   K 4.2 4.6    102   CO2 22 25   * 276*   BUN 23* 26*   CREA 0.86 1.06*   CA 8.8 9.2   ALB 3.3* 3.7   TBILI 0.4 0.4   ALT 12 13   INR  --  1.0       No results for input(s): PH, PCO2, PO2, HCO3, FIO2 in the last 72 hours.     24 Hour Results:  Recent Results (from the past 24 hour(s))   GLUCOSE, POC    Collection Time: 03/04/23 12:27 PM   Result Value Ref Range    Glucose (POC) 232 (H) 65 - 100 mg/dL    Performed by Katie King W/ RFLX MICROSCOPIC    Collection Time: 03/04/23  1:45 PM   Result Value Ref Range    Color Yellow/Straw      Appearance Turbid (A) Clear      Specific gravity 1.022 1.003 - 1.030      pH (UA) 5.0 5.0 - 8.0      Protein Negative Negative mg/dL    Glucose >300 (A) Negative mg/dL    Ketone 20 (A) Negative mg/dL    Bilirubin Negative Negative      Blood Negative Negative      Urobilinogen 0.1 0.1 - 1.0 EU/dL    Nitrites Negative Negative      Leukocyte Esterase Small (A) Negative      WBC 10-20 0 - 4 /hpf    RBC 0-5 0 - 5 /hpf    Bacteria Negative Negative /hpf    Mucus Trace /lpf   URINE MICROSCOPIC    Collection Time: 03/04/23  1:45 PM   Result Value Ref Range    WBC 10-20 0 - 4 /hpf    RBC 0-5 0 - 5 /hpf    Epithelial cells Moderate (A) Few /lpf    Bacteria Negative Negative /hpf    Mucus Trace (A) Negative /lpf   GLUCOSE, POC    Collection Time: 03/04/23  2:28 PM   Result Value Ref Range    Glucose (POC) 222 (H) 65 - 100 mg/dL    Performed by [address], POC    Collection Time: 03/04/23  5:35 PM   Result Value Ref Range    Glucose (POC) 172 (H) 65 - 100 mg/dL    Performed by [address], POC    Collection Time: 03/04/23  7:22 PM   Result Value Ref Range    Glucose (POC) 179 (H) 65 - 100 mg/dL    Performed by Lasha Fitch, POC    Collection Time: 03/04/23 11:29 PM   Result Value Ref Range    Glucose (POC) 222 (H) 65 - 100 mg/dL    Performed by Maldonado Siddiqui    GLUCOSE, POC    Collection Time: 03/05/23  7:00 AM   Result Value Ref Range    Glucose (POC) 278 (H) 65 - 100 mg/dL    Performed by Ronda Eckert (Float Pool)    GLUCOSE, POC    Collection Time: 03/05/23 11:12 AM   Result Value Ref Range    Glucose (POC) 293 (H) 65 - 100 mg/dL    Performed by Lauren Rodrigeuz (Float)        XR CHEST PORT   Final Result   Satisfactory NG tube placement. No Acute Disease. DUPLEX CAROTID BILATERAL         MRI BRAIN WO CONT   Final Result      1. Multiple acute to subacute infarcts in the left cerebral hemisphere   predominantly within the left frontoparietal deep white matter, and in a pattern   suggestive of watershed infarction. 2. Minimal chronic microvascular ischemic disease. Chronic infarcts in the   bilateral basal ganglia, left larger than right, and left cerebellum. XR CHEST PORT   Final Result   No acute process identified. CTA CODE NEURO HEAD AND NECK W CONT   Final Result   CTA Head:   1. No evidence of flow-limiting stenosis or aneurysm. Scattered atherosclerotic   disease as above, including severe stenosis in the right P2 segment, which is   unchanged. CTA Neck:   1. No evidence of flow-limiting stenosis. 2. Evaluation of the carotid bifurcations is limited by motion, though there are   suspected moderate stenoses of the bilateral proximal internal carotid arteries,   right worse than left. CT CODE NEURO HEAD WO CONTRAST   Final Result   Chronic ischemic changes. No acute findings.                  Assessment:    Acute ischemic stroke left frontal parietal hemisphere and a watershed distribution  -MRI suggests acute to subacute infarcts in the left cerebral hemisphere predominantly within the left frontoparietal deep white matter    HTN    Type 2 DM    Previous history of CVA without residual symptoms    Poor compliance    Hyponatremia      Discussion/MDM: Patient with multiple medical comorbidities, each with high likelihood for morbidity and mortality if left untreated. I have reviewed patient's presenting subjective and objective findings, as well as all laboratory studies, imaging studies, and vital signs to date as well as treatment rendered and patient's response to those treatments. In addition, prior medical, surgical and relevant social and family histories were reviewed. Plan:  Awaiting carotid duplex results  Keep n.p.o. Request telemetry neuro follow-up    The patient's son, Yani Phillips is primary decision maker. His cell phone number is [phone]. He called back yesterday and agreed to place the patient on DNR. He will be coming in today to discuss further steps. Prognosis appears guarded      Care Plan discussed with: Patient/Family    Code status: DNR    Social determinants of health: none known    Disposition/barriers to discharge: continue inpatient care    Total time spent with patient: 30 minutes.     Joaquin Roman MD

## 2023-03-05 NOTE — PROGRESS NOTES
Hospitalist Progress Note               Daily Progress Note: 3/5/2023      Subjective:   Hospital course to date:    Patient is a 77 yo female with a PMH of type 2 DM, HTN, previous smoker, and CVA that occurred in 2021 without residual symptoms who was admitted from the ED on 3/3 for slurred speech and and right-sided weakness. Exam shows right upper extremity drift and expressive aphasia. MRI of the head without contrast showed acute to subacute infarcts in the left cerebral hemisphere within the left frontoparietal deep white matter, and in a pattern suggestive of watershed infarction. Labs on admission include Sodium 132, Glucose 276, Creatinine 1.06. On 3/4, patient is seen for follow-up. BP is elevated at 126/58 and with low-grade fever at 99.2 degrees F. Patient has slurred speech and has difficulty answering questions. History was received also from 1-to-1. Awaiting carotid duplex US and Echo result. Neurology consulted and concluded the patient should continue stroke woke-up and order modified barium swallow and swallowing screen. Speech pathology consulted and stated the patient cannot tolerate po medication    Labs today show sodium 134    Confirms slight numbness in right side of body and difficulty speaking. Confirms numbness in lower extremity and distal right upper extremity.    ---------------  Patient is seen for follow-up. NG tube has been placed yesterday. Patient is aware son is visiting her today. No acute complaints at this time. History was received also from 1-to-1. Confirms slight numbness in right side of body and difficulty speaking. Confirms numbness in lower extremity and distal right upper extremity. Echo results are consistent with EF of 65-70% with abnormal diastolic function.     Labs today show      Problem List:  Problem List as of 3/5/2023 Date Reviewed: 3/3/2023            Codes Class Noted - Resolved    CVA (cerebral vascular accident) ([facility] ICD-10-CM: I63.9  ICD-9-CM: 434.91  3/3/2023 - Present        Acute right-sided weakness ICD-10-CM: R53.1  ICD-9-CM: 728.87  3/3/2023 - Present        Osteoporosis ICD-10-CM: M81.0  ICD-9-CM: 733.00  12/17/2022 - Present        Vitamin D deficiency ICD-10-CM: E55.9  ICD-9-CM: 268.9  12/17/2022 - Present        History of colon polyps ICD-10-CM: Z86.010  ICD-9-CM: V12.72  12/17/2022 - Present        Macrocytosis without anemia ICD-10-CM: D75.89  ICD-9-CM: 289.89  12/17/2022 - Present        Diabetes mellitus ([facility] ICD-10-CM: E11.9  ICD-9-CM: 250.00  3/11/2022 - Present        Benign essential HTN ICD-10-CM: I10  ICD-9-CM: 401.1  3/11/2022 - Present        Mixed hyperlipidemia ICD-10-CM: E78.2  ICD-9-CM: 272.2  3/11/2022 - Present        History of CVA (cerebrovascular accident) ICD-10-CM: Z86.73  ICD-9-CM: V12.54  3/11/2022 - Present        TIA (transient ischemic attack) ICD-10-CM: G45.9  ICD-9-CM: 435.9  12/8/2021 - Present        Slurred speech ICD-10-CM: R47.81  ICD-9-CM: 784.59  12/8/2021 - Present       Medications reviewed  Current Facility-Administered Medications   Medication Dose Route Frequency    enoxaparin (LOVENOX) injection 40 mg  40 mg SubCUTAneous Q24H    aspirin chewable tablet 81 mg  81 mg Oral DAILY    clopidogreL (PLAVIX) tablet 75 mg  75 mg Oral DAILY    cyanocobalamin tablet 1,000 mcg  1,000 mcg Oral DAILY    [Held by provider] insulin glargine (LANTUS) injection 14 Units  14 Units SubCUTAneous QHS    insulin lispro (HUMALOG) injection   SubCUTAneous AC&HS    glucose chewable tablet 16 g  4 Tablet Oral PRN    glucagon (GLUCAGEN) injection 1 mg  1 mg IntraMUSCular PRN    dextrose 10% infusion 0-250 mL  0-250 mL IntraVENous PRN    sodium chloride (NS) flush 5-40 mL  5-40 mL IntraVENous Q8H    sodium chloride (NS) flush 5-40 mL  5-40 mL IntraVENous PRN    acetaminophen (TYLENOL) tablet 650 mg  650 mg Oral Q6H PRN    Or    acetaminophen (TYLENOL) suppository 650 mg  650 mg Rectal Q6H PRN polyethylene glycol (MIRALAX) packet 17 g  17 g Oral DAILY PRN    ondansetron (ZOFRAN ODT) tablet 4 mg  4 mg Oral Q8H PRN    Or    ondansetron (ZOFRAN) injection 4 mg  4 mg IntraVENous Q6H PRN    atorvastatin (LIPITOR) tablet 80 mg  80 mg Oral QHS    [Held by provider] alogliptin (NESINA) tablet 12.5 mg  12.5 mg Oral DAILY    And    [Held by provider] metFORMIN (GLUCOPHAGE) tablet 1,000 mg  1,000 mg Oral BID WITH MEALS    amLODIPine (NORVASC) tablet 10 mg  10 mg Oral DAILY    And    lisinopriL (PRINIVIL, ZESTRIL) tablet 40 mg  40 mg Oral DAILY    0.9% sodium chloride infusion  75 mL/hr IntraVENous CONTINUOUS       Review of Systems:   A comprehensive review of systems was negative except for that written in the HPI. Objective:   Physical Exam:     Visit Vitals  BP (!) 142/63 (BP 1 Location: Left upper arm, BP Patient Position: Semi fowlers)   Pulse (!) 102   Temp 99.1 °F (37.3 °C)   Resp 17   Ht 5' 3\" (1.6 m)   Wt 79.4 kg (175 lb)   SpO2 96%   BMI 31.00 kg/m²      O2 Device: None (Room air)    Temp (24hrs), Av.4 °F (36.9 °C), Min:97.4 °F (36.3 °C), Max:99.1 °F (37.3 °C)    No intake/output data recorded.  1901 -  0700  In: -   Out: 400 [Urine:400]    General:   Awake and alert   Lungs:   Clear to auscultation bilaterally. Chest wall:  No tenderness or deformity. Heart:  Hypertensive. Regular rate and rhythm, S1, S2 normal, no murmur, click, rub or gallop. Abdomen:   Soft, non-tender. Bowel sounds normal. No masses,  No organomegaly. Extremities: Extremities normal, atraumatic, no cyanosis or edema. Pulses: 2+ and symmetric all extremities. Skin: Skin color, texture, turgor normal. No rashes or lesions   Neurologic: Facial droop on right side with drooling. Vibration and fine touch impaired in right lower extremity. Flaccid paralysis right arm and leg. Unable to indicate person, place, or time.          Data Review:       Recent Days:  Recent Labs     23  0434 23  1059   WBC 7.0 7.5   HGB 11.1* 12.1   HCT 33.7* 36.4    356       Recent Labs     03/04/23  0434 03/03/23  1059   * 132*   K 4.2 4.6    102   CO2 22 25   * 276*   BUN 23* 26*   CREA 0.86 1.06*   CA 8.8 9.2   ALB 3.3* 3.7   TBILI 0.4 0.4   ALT 12 13   INR  --  1.0       No results for input(s): PH, PCO2, PO2, HCO3, FIO2 in the last 72 hours.     24 Hour Results:  Recent Results (from the past 24 hour(s))   GLUCOSE, POC    Collection Time: 03/04/23  9:50 AM   Result Value Ref Range    Glucose (POC) 276 (H) 65 - 100 mg/dL    Performed by [address], POC    Collection Time: 03/04/23 12:27 PM   Result Value Ref Range    Glucose (POC) 232 (H) 65 - 100 mg/dL    Performed by Radha Marie W/ RFLX MICROSCOPIC    Collection Time: 03/04/23  1:45 PM   Result Value Ref Range    Color Yellow/Straw      Appearance Turbid (A) Clear      Specific gravity 1.022 1.003 - 1.030      pH (UA) 5.0 5.0 - 8.0      Protein Negative Negative mg/dL    Glucose >300 (A) Negative mg/dL    Ketone 20 (A) Negative mg/dL    Bilirubin Negative Negative      Blood Negative Negative      Urobilinogen 0.1 0.1 - 1.0 EU/dL    Nitrites Negative Negative      Leukocyte Esterase Small (A) Negative      WBC 10-20 0 - 4 /hpf    RBC 0-5 0 - 5 /hpf    Bacteria Negative Negative /hpf    Mucus Trace /lpf   URINE MICROSCOPIC    Collection Time: 03/04/23  1:45 PM   Result Value Ref Range    WBC 10-20 0 - 4 /hpf    RBC 0-5 0 - 5 /hpf    Epithelial cells Moderate (A) Few /lpf    Bacteria Negative Negative /hpf    Mucus Trace (A) Negative /lpf   GLUCOSE, POC    Collection Time: 03/04/23  2:28 PM   Result Value Ref Range    Glucose (POC) 222 (H) 65 - 100 mg/dL    Performed by [address], POC    Collection Time: 03/04/23  5:35 PM   Result Value Ref Range    Glucose (POC) 172 (H) 65 - 100 mg/dL    Performed by Aislinn Farah, POC    Collection Time: 03/04/23  7:22 PM   Result Value Ref Range    Glucose (POC) 179 (H) 65 - 100 mg/dL    Performed by Dianna Valencia, POC    Collection Time: 03/04/23 11:29 PM   Result Value Ref Range    Glucose (POC) 222 (H) 65 - 100 mg/dL    Performed by iVcky Siddiqui    GLUCOSE, POC    Collection Time: 03/05/23  7:00 AM   Result Value Ref Range    Glucose (POC) 278 (H) 65 - 100 mg/dL    Performed by Mary Ann Moser (Float Pool)        XR CHEST PORT   Final Result   Satisfactory NG tube placement. No Acute Disease. DUPLEX CAROTID BILATERAL         MRI BRAIN WO CONT   Final Result      1. Multiple acute to subacute infarcts in the left cerebral hemisphere   predominantly within the left frontoparietal deep white matter, and in a pattern   suggestive of watershed infarction. 2. Minimal chronic microvascular ischemic disease. Chronic infarcts in the   bilateral basal ganglia, left larger than right, and left cerebellum. XR CHEST PORT   Final Result   No acute process identified. CTA CODE NEURO HEAD AND NECK W CONT   Final Result   CTA Head:   1. No evidence of flow-limiting stenosis or aneurysm. Scattered atherosclerotic   disease as above, including severe stenosis in the right P2 segment, which is   unchanged. CTA Neck:   1. No evidence of flow-limiting stenosis. 2. Evaluation of the carotid bifurcations is limited by motion, though there are   suspected moderate stenoses of the bilateral proximal internal carotid arteries,   right worse than left. CT CODE NEURO HEAD WO CONTRAST   Final Result   Chronic ischemic changes. No acute findings.                  Assessment:    Acute ischemic stroke left frontal parietal hemisphere and a watershed distribution  -MRI suggests acute to subacute infarcts in the left cerebral hemisphere predominantly within the left frontoparietal deep white matter    HTN    Type 2 DM    Previous history of CVA without residual symptoms    Poor compliance    Hyponatremia      Discussion/MDM: Patient with multiple medical comorbidities, each with high likelihood for morbidity and mortality if left untreated. I have reviewed patient's presenting subjective and objective findings, as well as all laboratory studies, imaging studies, and vital signs to date as well as treatment rendered and patient's response to those treatments. In addition, prior medical, surgical and relevant social and family histories were reviewed. Plan:  Awaiting carotid duplex results  Keep n.p.o. Request telemetry neuro follow-up    The patient's son, Akua Bill is primary decision maker. His cell phone number is [phone]. He called back yesterday and agreed to place the patient on DNR. He will be coming in today to discuss further steps. Prognosis appears guarded      Care Plan discussed with: Patient/Family    Code status: DNR    Social determinants of health: none known    Disposition/barriers to discharge: continue inpatient care    Total time spent with patient: 30 minutes.     Yao Greer

## 2023-03-05 NOTE — PROGRESS NOTES
Hospitalist Progress Note               Daily Progress Note: 3/5/2023      Subjective:   Hospital course to date:    Patient is a 79 yo female with a PMH of type 2 DM, HTN, previous smoker, and CVA that occurred in 2021 without residual symptoms who was admitted from the ED on 3/3 for slurred speech and and right-sided weakness. Exam shows right upper extremity drift and expressive aphasia. MRI of the head without contrast showed acute to subacute infarcts in the left cerebral hemisphere within the left frontoparietal deep white matter, and in a pattern suggestive of watershed infarction. Labs on admission include Sodium 132, Glucose 276, Creatinine 1.06. On 3/4, patient is seen for follow-up. BP is elevated at 126/58 and with low-grade fever at 99.2 degrees F. Patient has slurred speech and has difficulty answering questions. History was received also from 1-to-1. Awaiting carotid duplex US and Echo result. Neurology consulted and concluded the patient should continue stroke woke-up and order modified barium swallow and swallowing screen. Speech pathology consulted and stated the patient cannot tolerate po medication    Labs today show sodium 134    Confirms slight numbness in right side of body and difficulty speaking. Confirms numbness in lower extremity and distal right upper extremity.    ---------------  Patient is seen for follow-up. NG tube has been placed yesterday. Patient is aware son is visiting her today. No acute complaints at this time. History was received also from 1-to-1. Confirms slight numbness in right side of body and difficulty speaking. Confirms numbness in lower extremity and distal right upper extremity. Echo results are consistent with EF of 65-70% with abnormal diastolic function. No new labs received today.       Problem List:  Problem List as of 3/5/2023 Date Reviewed: 3/3/2023            Codes Class Noted - Resolved    CVA (cerebral vascular accident) [facility]) ICD-10-CM: I63.9  ICD-9-CM: 434.91  3/3/2023 - Present        Acute right-sided weakness ICD-10-CM: R53.1  ICD-9-CM: 728.87  3/3/2023 - Present        Osteoporosis ICD-10-CM: M81.0  ICD-9-CM: 733.00  12/17/2022 - Present        Vitamin D deficiency ICD-10-CM: E55.9  ICD-9-CM: 268.9  12/17/2022 - Present        History of colon polyps ICD-10-CM: Z86.010  ICD-9-CM: V12.72  12/17/2022 - Present        Macrocytosis without anemia ICD-10-CM: D75.89  ICD-9-CM: 289.89  12/17/2022 - Present        Diabetes mellitus ([facility] [address] ICD-10-CM: E11.9  ICD-9-CM: 250.00  3/11/2022 - Present        Benign essential HTN ICD-10-CM: I10  ICD-9-CM: 401.1  3/11/2022 - Present        Mixed hyperlipidemia ICD-10-CM: E78.2  ICD-9-CM: 272.2  3/11/2022 - Present        History of CVA (cerebrovascular accident) ICD-10-CM: Z86.73  ICD-9-CM: V12.54  3/11/2022 - Present        TIA (transient ischemic attack) ICD-10-CM: G45.9  ICD-9-CM: 435.9  12/8/2021 - Present        Slurred speech ICD-10-CM: R47.81  ICD-9-CM: 784.59  12/8/2021 - Present       Medications reviewed  Current Facility-Administered Medications   Medication Dose Route Frequency    enoxaparin (LOVENOX) injection 40 mg  40 mg SubCUTAneous Q24H    aspirin chewable tablet 81 mg  81 mg Oral DAILY    clopidogreL (PLAVIX) tablet 75 mg  75 mg Oral DAILY    cyanocobalamin tablet 1,000 mcg  1,000 mcg Oral DAILY    [Held by provider] insulin glargine (LANTUS) injection 14 Units  14 Units SubCUTAneous QHS    insulin lispro (HUMALOG) injection   SubCUTAneous AC&HS    glucose chewable tablet 16 g  4 Tablet Oral PRN    glucagon (GLUCAGEN) injection 1 mg  1 mg IntraMUSCular PRN    dextrose 10% infusion 0-250 mL  0-250 mL IntraVENous PRN    sodium chloride (NS) flush 5-40 mL  5-40 mL IntraVENous Q8H    sodium chloride (NS) flush 5-40 mL  5-40 mL IntraVENous PRN    acetaminophen (TYLENOL) tablet 650 mg  650 mg Oral Q6H PRN    Or    acetaminophen (TYLENOL) suppository 650 mg  650 mg Rectal Q6H PRN    polyethylene glycol (MIRALAX) packet 17 g  17 g Oral DAILY PRN    ondansetron (ZOFRAN ODT) tablet 4 mg  4 mg Oral Q8H PRN    Or    ondansetron (ZOFRAN) injection 4 mg  4 mg IntraVENous Q6H PRN    atorvastatin (LIPITOR) tablet 80 mg  80 mg Oral QHS    [Held by provider] alogliptin (NESINA) tablet 12.5 mg  12.5 mg Oral DAILY    And    [Held by provider] metFORMIN (GLUCOPHAGE) tablet 1,000 mg  1,000 mg Oral BID WITH MEALS    amLODIPine (NORVASC) tablet 10 mg  10 mg Oral DAILY    And    lisinopriL (PRINIVIL, ZESTRIL) tablet 40 mg  40 mg Oral DAILY    0.9% sodium chloride infusion  75 mL/hr IntraVENous CONTINUOUS       Review of Systems:   A comprehensive review of systems was negative except for that written in the HPI. Objective:   Physical Exam:     Visit Vitals  BP (!) 142/63 (BP 1 Location: Left upper arm, BP Patient Position: Semi fowlers)   Pulse (!) 102   Temp 99.1 °F (37.3 °C)   Resp 17   Ht 5' 3\" (1.6 m)   Wt 79.4 kg (175 lb)   SpO2 98%   BMI 31.00 kg/m²      O2 Device: None (Room air)    Temp (24hrs), Av.5 °F (36.9 °C), Min:97.4 °F (36.3 °C), Max:99.1 °F (37.3 °C)    701 -  190  In: 150   Out: -    1901 -  07  In: -   Out: 400 [Urine:400]    General:   Awake and alert   Lungs:   Clear to auscultation bilaterally. Chest wall:  No tenderness or deformity. Heart:  Hypertensive. Regular rate and rhythm, S1, S2 normal, no murmur, click, rub or gallop. Abdomen:   Soft, non-tender. Bowel sounds normal. No masses,  No organomegaly. Extremities: Extremities normal, atraumatic, no cyanosis or edema. Pulses: 2+ and symmetric all extremities. Skin: Skin color, texture, turgor normal. No rashes or lesions   Neurologic: Facial droop on right side with drooling. Vibration and fine touch impaired in right lower extremity. Flaccid paralysis right arm and leg. Unable to indicate person, place, or time.          Data Review:       Recent Days:  Recent Labs 03/04/23  0434 03/03/23  1059   WBC 7.0 7.5   HGB 11.1* 12.1   HCT 33.7* 36.4    356       Recent Labs     03/04/23 0434 03/03/23  1059   * 132*   K 4.2 4.6    102   CO2 22 25   * 276*   BUN 23* 26*   CREA 0.86 1.06*   CA 8.8 9.2   ALB 3.3* 3.7   TBILI 0.4 0.4   ALT 12 13   INR  --  1.0       No results for input(s): PH, PCO2, PO2, HCO3, FIO2 in the last 72 hours.     24 Hour Results:  Recent Results (from the past 24 hour(s))   GLUCOSE, POC    Collection Time: 03/04/23 12:27 PM   Result Value Ref Range    Glucose (POC) 232 (H) 65 - 100 mg/dL    Performed by Marianne Benjamin W/ RFLX MICROSCOPIC    Collection Time: 03/04/23  1:45 PM   Result Value Ref Range    Color Yellow/Straw      Appearance Turbid (A) Clear      Specific gravity 1.022 1.003 - 1.030      pH (UA) 5.0 5.0 - 8.0      Protein Negative Negative mg/dL    Glucose >300 (A) Negative mg/dL    Ketone 20 (A) Negative mg/dL    Bilirubin Negative Negative      Blood Negative Negative      Urobilinogen 0.1 0.1 - 1.0 EU/dL    Nitrites Negative Negative      Leukocyte Esterase Small (A) Negative      WBC 10-20 0 - 4 /hpf    RBC 0-5 0 - 5 /hpf    Bacteria Negative Negative /hpf    Mucus Trace /lpf   URINE MICROSCOPIC    Collection Time: 03/04/23  1:45 PM   Result Value Ref Range    WBC 10-20 0 - 4 /hpf    RBC 0-5 0 - 5 /hpf    Epithelial cells Moderate (A) Few /lpf    Bacteria Negative Negative /hpf    Mucus Trace (A) Negative /lpf   GLUCOSE, POC    Collection Time: 03/04/23  2:28 PM   Result Value Ref Range    Glucose (POC) 222 (H) 65 - 100 mg/dL    Performed by [address], POC    Collection Time: 03/04/23  5:35 PM   Result Value Ref Range    Glucose (POC) 172 (H) 65 - 100 mg/dL    Performed by [address], POC    Collection Time: 03/04/23  7:22 PM   Result Value Ref Range    Glucose (POC) 179 (H) 65 - 100 mg/dL    Performed by Nguyen Damon    GLUCOSE, POC    Collection Time: 03/04/23 11:29 PM Result Value Ref Range    Glucose (POC) 222 (H) 65 - 100 mg/dL    Performed by [employer]    GLUCOSE, POC    Collection Time: 03/05/23  7:00 AM   Result Value Ref Range    Glucose (POC) 278 (H) 65 - 100 mg/dL    Performed by Lakeshia Alexander ([place])        XR CHEST PORT   Final Result   Satisfactory NG tube placement. No Acute Disease. DUPLEX CAROTID BILATERAL         MRI BRAIN WO CONT   Final Result      1. Multiple acute to subacute infarcts in the left cerebral hemisphere   predominantly within the left frontoparietal deep white matter, and in a pattern   suggestive of watershed infarction. 2. Minimal chronic microvascular ischemic disease. Chronic infarcts in the   bilateral basal ganglia, left larger than right, and left cerebellum. XR CHEST PORT   Final Result   No acute process identified. CTA CODE NEURO HEAD AND NECK W CONT   Final Result   CTA Head:   1. No evidence of flow-limiting stenosis or aneurysm. Scattered atherosclerotic   disease as above, including severe stenosis in the right P2 segment, which is   unchanged. CTA Neck:   1. No evidence of flow-limiting stenosis. 2. Evaluation of the carotid bifurcations is limited by motion, though there are   suspected moderate stenoses of the bilateral proximal internal carotid arteries,   right worse than left. CT CODE NEURO HEAD WO CONTRAST   Final Result   Chronic ischemic changes. No acute findings. Assessment:    Acute ischemic stroke left frontal parietal hemisphere and a watershed distribution  -MRI suggests acute to subacute infarcts in the left cerebral hemisphere predominantly within the left frontoparietal deep white matter    HTN    Type 2 DM    Previous history of CVA without residual symptoms    Poor compliance    Hyponatremia      Discussion/MDM: Patient with multiple medical comorbidities, each with high likelihood for morbidity and mortality if left untreated.    I have reviewed patient's presenting subjective and objective findings, as well as all laboratory studies, imaging studies, and vital signs to date as well as treatment rendered and patient's response to those treatments. In addition, prior medical, surgical and relevant social and family histories were reviewed. Plan:  Awaiting carotid duplex results  Keep n.p.o. Request telemetry neuro follow-up    The patient's son, Summer Padron is primary decision maker. His cell phone number is [phone]. He called back yesterday and agreed to place the patient on DNR. He will be coming in today to discuss further steps. Prognosis appears guarded      Care Plan discussed with: Patient/Family    Code status: DNR    Social determinants of health: none known    Disposition/barriers to discharge: continue inpatient care    Total time spent with patient: 30 minutes.     [facility]

## 2023-03-05 NOTE — PROGRESS NOTES
Dysphagia Treatment Narrative:  Patient: Ida Parnell (71 y.o. female)  Date: 3/5/2023  Diagnosis: CVA (cerebral vascular accident) ([facility] [address] [I63.9]       Precautions: Aspiration    ASSESSMENT :  Dysphagia treatment: 8 minutes  Patient seen upright in bed, alert though drowsy and lethargic. Oriented x0, unable to state name today. Appears more confused. NGT present and per RN tolerating. R-side facial weakness present with copious secretions present and r-side labial spillage of secretions. Clinician suctions patient. Patient is agreeable to po trials of mildly thick liquids x2 and significant spillage present and pooling in r-side lateral and anterior sulci. Throat clearing present. Upon digital palpation: patient's swallow initiation appears delayed and HLE appears weak/reduced. Clinician suctions oral cavity. Patient presents moderate to severe oropharyngeal dysphagia. Patient attempts the following OMES:  Labial protrusion with resistance 2/5 trials effectively completed. Labial retraction: 0/3 trials effectively completed    Patient is too drowsy to participate in language treatment today and states \"another day. \"    [facility], though patient is not appropriate to participate in MBS at this time. Clinician communicated with ST team to f/u with patient prior to attempting MBS on 3/6/23. Patient might require an alternate source of nutrient intake such as a PEG-tube given swallow function at bedside. Patient will benefit from skilled intervention to address the above impairments. Patients rehabilitation potential is considered to be Good     PLAN :  Recommendations and Planned Interventions:  NPO status  Alternate source of nutrient intake  MBS, as/if able to participate  SW and ST tx to follow  Frequency/Duration: Patient will be followed by speech-language pathology 5 times a week to address goals. Discharge Recommendations:  To Be Determined     SUBJECTIVE:   Patient reports \"I cant remember. \"    OBJECTIVE:     Past Medical History:   Diagnosis Date    Diabetes ([facility] [address]     Hypercholesterolemia     Hypertension     Stroke ([facility] [address]        CXR Results  (Last 48 hours)                 03/04/23 1839  XR CHEST PORT Final result    Impression:  Satisfactory NG tube placement. No Acute Disease. Narrative:  EXAM: Portable CXR. 1839 hours. INDICATION: To check placement of NG tube       FINDINGS:   NG tube is satisfactorily positioned in the stomach. The lungs appear clear. Heart is normal in size. There is no pulmonary edema. There is no evident   pneumothorax or pleural effusion. Current Diet:  DIET ADULT TUBE FEEDING  DIET NPO     Cognitive and Communication Status:  Neurologic State: Drowsy, Lethargic  Orientation Level: Disoriented X4  Cognition: Poor safety awareness, Decreased command following  Perception: Cues to attend to right side of body, Cues to attend right visual field  Perseveration: No perseveration noted  Safety/Judgement: Decreased awareness of environment, Decreased awareness of need for assistance, Decreased awareness of need for safety, Decreased insight into deficits  Dysphagia Treatment:  Oral Assessment:  Oral Assessment  Labial: Right droop  Dentition: Upper & lower dentures  Oral Hygiene: WFL  Lingual: Decreased strength; Incoordinated  P.O. Trials:     Vocal quality prior to P.O.: Low volume  Consistency Presented: Nectar thick liquid  How Presented: SLP-fed/presented;Spoon     Bolus Acceptance: No impairment  Bolus Formation/Control: Impaired  Type of Impairment: Drooling; Incomplete;Lip closure;Piecemeal  Propulsion: Delayed (# of seconds); Discoordination  Oral Residue: Greater than 50% of bolus; Lingual;Right; Anterior;Pocketing  Initiation of Swallow: Delayed (# of seconds)  Laryngeal Elevation: Decreased;Weak  Aspiration Signs/Symptoms: Clear throat  Pharyngeal Phase Characteristics: Multiple swallows; Poor endurance  Oral Phase Severity: Moderate-severe  Pharyngeal Phase Severity : Moderate-severe    Pain:  0/10 numeric       After treatment:   Patient left in no apparent distress in bed    COMMUNICATION/EDUCATION:   Patient was educated regarding her deficit(s) of dysphagia, though requires additional training and education. The patient's plan of care including recommendations, planned interventions, and recommended diet changes were discussed with: Registered nurse and Physician. Patient is unable to participate in goal setting and plan of care. Thank you for this referral.  Cathryn Gonzalez M.S., CCC-SLP  Time Calculation: 9 mins   Problem: Dysphagia (Adult)  Goal: *Acute Goals and Plan of Care (Insert Text)  Description: Speech Therapy Swallow Goals  Initiated 3/3/2023  -Patient will tolerate full diet with moderately thick liquids without clinical indicators of aspiration given moderate cues within 7 day(s). Not Met        -Patient will tolerate PO trials without clinical indicators of aspiration given moderate cues within 7 day(s). -Patient will participate in modified barium swallow study within 7 day(s). -Patient will demonstrate understanding of swallow safety precautions and aspiration precautions, diet recs with moderate cues within 7 day(s).          -Patient will participate in speech language evaluation.     -Patient/caregiver goal: eat safely  Outcome: Progressing Towards Goal     Problem: Patient Education: Go to Patient Education Activity  Goal: Patient/Family Education  Outcome: Progressing Towards Goal

## 2023-03-05 NOTE — PROGRESS NOTES
Problem: Mobility Impaired (Adult and Pediatric)  Goal: *Acute Goals and Plan of Care (Insert Text)  Description: FUNCTIONAL STATUS PRIOR TO ADMISSION: Patient was modified independent using a single point cane for functional mobility. HOME SUPPORT PRIOR TO ADMISSION: The patient lived with 8 years old mother but did not require assist.    Patient stated goal: none verbalized  Patient will move from supine to sit and sit to supine , scoot up and down, and roll side to side in bed with moderate assistance  within 7 day(s). Patient will transfer from bed to chair and chair to bed with moderate assistance  using the least restrictive device within 7 day(s). Patient will improve static sitting balance to minimal assistance within 1 week(s). Patient will ambulate 10 feet with moderate assistance with least restrictive device within 1 weeks. Outcome: Progressing Towards Goal   PHYSICAL THERAPY TREATMENT  Patient: Tommy Muñzo (71 y.o. female)  Date: 3/5/2023  Diagnosis: CVA (cerebral vascular accident) ([facility] [address] [I63.9] <principal problem not specified>      Precautions: In place during session: Peripheral IV, EKG/telemetry , and Nasogastric Tube  Chart, physical therapy assessment, plan of care and goals were reviewed. ASSESSMENT  Patient continues with skilled PT services and is progressing towards goals. Pt semi-supine upon PT arrival, agreeable to session. Co-treat with OT due to level of assist required for bed mobility/transfers for safety of patient/clinicians. Patient transferred EOB with modA x 2 and incr time. She sat EOB for approx 8 minutes with SBA-Quynh x 1 to maintain sitting balance. Worked on trunk and LE/UE strengthening exercises and upright tolerance. Patient fatigued by end of session and requiring maxA x 2 to return to bed, Quynh to roll to R side so keven pad could be straightened and total assist to scoot to [facility] and position to comfort.  Tube feed resumed at end of session and nursing notified. (See below for objective details and assist levels). Overall pt tolerated session fair today. Will continue to benefit from skilled PT services, and will continue to progress as tolerated. Current Level of Function Impacting Discharge (mobility/balance): decr strength, decr mobility, decr endurance     Other factors to consider for discharge: good family support, CVA deficits, level of assist, assist x 2         PLAN :  Patient continues to benefit from skilled intervention to address the above impairments. Continue treatment per established plan of care to address goals. Recommend with staff: Encourage HEP in prep for ADLs/mobility, Frequent repositioning to prevent skin breakdown, Use of bed/chair alarm for safety, and LE elevation for management of edema    Recommendation for discharge: (in order for the patient to meet his/her long term goals)  [address] [facility]     This discharge recommendation:  Has been made in collaboration with the attending provider and/or case management    IF patient discharges home will need the following DME: to be determined (TBD)       SUBJECTIVE:   Patient stated \"wait, that's not right.  - when patient responded with the wrong first name     OBJECTIVE DATA SUMMARY:   Critical Behavior:  Neurologic State: Alert  Orientation Level: Disoriented to person, Disoriented to time, Oriented to place, Disoriented to situation  Cognition: Decreased attention/concentration, Decreased command following  Safety/Judgement: Decreased awareness of environment, Decreased awareness of need for assistance, Decreased awareness of need for safety, Decreased insight into deficits  Functional Mobility Training:  Bed Mobility:  Rolling: Minimum assistance;Assist x1 (To R side only)  Supine to Sit: Moderate assistance;Assist x2; Additional time  Sit to Supine: Maximum assistance;Assist x2; Additional time    Balance:  Sitting: Impaired  Sitting - Static: Fair (occasional)  Sitting - Dynamic: Poor (constant support)    Therapeutic Exercises:       EXERCISE   Sets   Reps   Active Active Assist   Passive Self ROM   Comments   Ankle Pumps   [] [] [] []    Quad Sets/Glut Sets   [] [] [] []    Hamstring Sets   [] [] [] []    Short Arc Quads   [] [] [] []    Heel Slides   [] [] [] []    Straight Leg Raises   [] [] [] []    Hip abd/add   [] [] [] []    Long Arc Quads 1 10 [x] [x] [] [] Sitting, AROM LLE, AAROM RLE   Marching   [] [] [] []       [] [] [] []       Patient performed lateral and forward<>backward trunk leans with Quynh for sitting balance and RUE outstretched on bed to facilitate weight bearing for push off   Patient also performed UE exercises with OT    Pain Rating:  None reported     Activity Tolerance:   Poor, SpO2 stable on RA, requires frequent rest breaks, and observed SOB with activity    After treatment patient left in no apparent distress:   Bed locked and returned to lowest position, Supine in bed, Heels elevated for pressure relief, Call bell within reach, Bed / chair alarm activated, Caregiver / family present, and Side rails x 3    COMMUNICATION/COLLABORATION:   The patients plan of care was discussed with: Physical therapist, Occupational therapist, and Registered nurse.      Marlene Funes, PT   Time Calculation: 25 mins

## 2023-03-05 NOTE — PROGRESS NOTES
OCCUPATIONAL THERAPY EVALUATION  Patient: Bere Sotomayor (71 y.o. female)  Date: 3/5/2023  Primary Diagnosis: CVA (cerebral vascular accident) ([facility] [address] [I63.9]       Precautions: fall risk     In place during session: Peripheral IV, EKG/telemetry , and Nasogastric Tube    ASSESSMENT  Pt is a 78 y.o. female presenting to [facility] with c/o speech slurred and R side weakness, admitted 3/3 and currently being treated for CVA work up. MRI shows multiple acute to subacute infarcts in the L cerebral hemisphere along w/ chronic infarcts in the bilateral basal ganglia, L larger than R, and L cerebellum. PMHx consists of CVA in 2021 w/ no residual deficits. Pt received semi-supine in bed upon arrival, AXO x1 (disoriented to name and month), and agreeable to OT evaluation/PTA treat. Based on current observations, pt presents with deficits in generalized strength/AROM, bed mobility, static/dynamic sitting balance, decreased attention/command following (increased time for processing) and functional activity tolerance currently impacting overall performance of ADLs and functional transfers/mobility (see below for objective details and assist levels). Overall, pt tolerates session fair with flaccid RUE (no subluxation noted), R sided visual neglect, expressive aphasia, and slight slurred speech. Pt demo'd good LUE strength/ROM and able to A bring self toward EOB but still req'd mod A x2 for safety. Tolerated sitting EOB for ~6-8 minutes w/ SBA-min A (brief periods of SBA while pt was able to use LUE to support self). She presented w/ anterior/R lateral lean. Pt able to cross midline to reach for objects but req'd increased time to process commands and cues to turn head to right to locate object. Pt presents w/ R sided facial droop and demo'd difficultly maintain secretions in mouth. Pt completed 10 reps of ROM on RUE shoulder/elbow; trace scapular rotation noted but unable to complete scapular retraction.  See PTA note for further details on BLE exs/sitting activities at EOB. Pt would benefit from continued skilled OT services to address current impairments and improve IND and safety with self cares and functional transfers/mobility. At this time, pt benefit from intensive rehab (at least 3 hrs of therapy) to improve safety/IND w/ functional mobility/ADLs. Current OT d/c recommendation Inpatient Rehabilitation Facility  once medically appropriate. Other factors to consider for discharge: family/social support, DME, time since onset, severity of deficits, functional baseline     Patient will benefit from skilled therapy intervention to address the above noted impairments. PLAN :  Recommendations and Planned Interventions: self care training, functional mobility training, therapeutic exercise, balance training, therapeutic activities, endurance activities, and patient education    Recommend with staff: Frequent repositioning to prevent skin breakdown; ensure RUE is positioned w/ pillows    Recommend next session: sitting balance and WB through RUE    Frequency/Duration: Patient will be followed by occupational therapy:  3-5x/week to address goals. Recommendation for discharge: (in order for the patient to meet his/her long term goals)  [facility]     This discharge recommendation:  Has been made in collaboration with the attending provider and/or case management    IF patient discharges home will need the following DME: TBD at next placement       SUBJECTIVE:   Patient stated no pain.     OBJECTIVE DATA SUMMARY:   HISTORY:   Past Medical History:   Diagnosis Date    Diabetes ([facility] [address]     Hypercholesterolemia     Hypertension     Stroke [facility])      Past Surgical History:   Procedure Laterality Date    HX BACK SURGERY      HX CARPAL TUNNEL RELEASE      HX CATARACT REMOVAL Bilateral     HX ORTHOPAEDIC      ankle       Per medical chart:   Home Situation  Home Environment: Private residence  # Steps to Enter: 4  One/Two Story Residence: One story  Living Alone: No  Support Systems: Parent(s)  Patient Expects to be Discharged to[de-identified] Rehab hospital/unit acute  Current DME Used/Available at Home: Walker, rolling    Hand dominance: Left    EXAMINATION OF PERFORMANCE DEFICITS:  Cognitive/Behavioral Status:  Neurologic State: Alert  Orientation Level: Disoriented to person;Disoriented to time;Oriented to place; Disoriented to situation  Cognition: Decreased attention/concentration;Decreased command following             Vision/Perceptual:                   Visual Fields:  (appears to have R sided visual neglect); OT/PTA educated family on sitting on R sided to encourage pt to attend toward R side                 Range of Motion:  AROM: Grossly decreased, non-functional (RUE)  PROM: Within functional limits                      Strength:  Strength: Grossly decreased, non-functional ([facility]APIA is [facility])                Coordination:     Fine Motor Skills-Upper: Left Intact; Right Impaired    Gross Motor Skills-Upper: Left Intact; Right Impaired    Tone & Sensation:  Tone: Abnormal (flaccid RUE)                         Balance:  Sitting: Impaired  Sitting - Static: Fair (occasional)  Sitting - Dynamic: Poor (constant support)    Functional Mobility and Transfers for ADLs:  Bed Mobility:  Rolling: Minimum assistance;Assist x1 (To R side only)  Supine to Sit: Moderate assistance;Assist x2; Additional time  Sit to Supine: Maximum assistance;Assist x2; Additional time    Transfers: Therapeutic Exercise:  Pt will benefit from BUE HEP to improve participation in ADLs and mobility. Plan will be initiated at next session. Functional Measure:    Korin Zimmer AM-PACGIOVANNA \"6 Clicks\"                                                       Daily Activity Inpatient Short Form  How much help from another person does the patient currently need. .. Total; A Lot A Little None   1. Putting on and taking off regular lower body clothing?  []  1 [x]  2 []  3 []  4   2. Bathing (including washing, rinsing, drying)? []  1 [x]  2 []  3 []  4   3. Toileting, which includes using toilet, bedpan or urinal? [] 1 [x]  2 []  3 []  4   4. Putting on and taking off regular upper body clothing? []  1 [x]  2 []  3 []  4   5. Taking care of personal grooming such as brushing teeth? []  1 []  2 [x]  3 []  4   6. Eating meals? []  1 []  2 [x]  3 []  4   © 2007, [address] [address], under license to [employer]. All rights reserved     Score: 14/24     Interpretation of Tool:  Represents clinically-significant functional categories (i.e. Activities of daily living). Percentage of Impairment CH    0%   CI    1-19% CJ    20-39% CK    40-59% CL    60-79% CM    80-99% CN     100%   [facility]  Score 6-24 24 23 20-22 15-19 10-14 7-9 6     Occupational Therapy Evaluation Charge Determination   History Examination Decision-Making   MEDIUM Complexity : Expanded review of history including physical, cognitive and psychosocial  history  MEDIUM Complexity : 3-5 performance deficits relating to physical, cognitive , or psychosocial skils that result in activity limitations and / or participation restrictions MEDIUM Complexity : Patient may present with comorbidities that affect occupational performnce.  Miniml to moderate modification of tasks or assistance (eg, physical or verbal ) with assesment(s) is necessary to enable patient to complete evaluation       Based on the above components, the patient evaluation is determined to be of the following complexity level: MEDIUM  Pain Rating:  No reports of pain    Activity Tolerance:   Fair and requires rest breaks    After treatment patient left in no apparent distress:    Supine in bed, Call bell within reach, Bed / chair alarm activated, Side rails x 3, and HOB elevated >30 degrees , bed locked and in lowest position    COMMUNICATION/EDUCATION:   The patients plan of care was discussed with: Physical therapy assistant and Registered nurse. Patient/family have participated as able in goal setting and plan of care. and Patient/family agree to work toward stated goals and plan of care. This patients plan of care is appropriate for delegation to TUY[facility]. PTA/OT sessions occurred together for increased safety of pt and clinician. Thank you for this referral.  Cleo Taylor OT  Time Calculation: 27 mins   Problem: Self Care Deficits Care Plan (Adult)  Goal: *Acute Goals and Plan of Care (Insert Text)  Description: FUNCTIONAL STATUS PRIOR TO ADMISSION: Per medical chart, pt was IND w/ ADLs and functional mobility. HOME SUPPORT: pt lives with mother who is 8 years old.     Occupational Therapy Goals  Initiated 3/5/2023    Pt stated goal not appropriate at this time   Pt will be mod A sup <> sit in prep for EOB ADLs  Pt will be CGA grooming sitting at EOB LRAD  Pt will be min a UB dressing sitting EOB/long sit   Pt will be max A  LE dressing sitting EOB/long sit  Pt will be min A following UE HEP in prep for self care tasks   *will add additional goals as appropriate  Outcome: Not Met

## 2023-03-06 LAB
GLUCOSE BLD STRIP.AUTO-MCNC: 224 MG/DL (ref 65–100)
GLUCOSE BLD STRIP.AUTO-MCNC: 237 MG/DL (ref 65–100)
GLUCOSE BLD STRIP.AUTO-MCNC: 253 MG/DL (ref 65–100)
GLUCOSE BLD STRIP.AUTO-MCNC: 321 MG/DL (ref 65–100)
GLUCOSE BLD STRIP.AUTO-MCNC: 325 MG/DL (ref 65–100)
PERFORMED BY, TECHID: ABNORMAL

## 2023-03-06 PROCEDURE — 74011636637 HC RX REV CODE- 636/637: Performed by: NURSE PRACTITIONER

## 2023-03-06 PROCEDURE — 74011250636 HC RX REV CODE- 250/636: Performed by: NURSE PRACTITIONER

## 2023-03-06 PROCEDURE — 74011250636 HC RX REV CODE- 250/636: Performed by: INTERNAL MEDICINE

## 2023-03-06 PROCEDURE — 74011000250 HC RX REV CODE- 250: Performed by: NURSE PRACTITIONER

## 2023-03-06 PROCEDURE — 92526 ORAL FUNCTION THERAPY: CPT

## 2023-03-06 PROCEDURE — 65270000029 HC RM PRIVATE

## 2023-03-06 PROCEDURE — 74011250637 HC RX REV CODE- 250/637: Performed by: NURSE PRACTITIONER

## 2023-03-06 PROCEDURE — 92507 TX SP LANG VOICE COMM INDIV: CPT

## 2023-03-06 PROCEDURE — 97530 THERAPEUTIC ACTIVITIES: CPT

## 2023-03-06 PROCEDURE — 82962 GLUCOSE BLOOD TEST: CPT

## 2023-03-06 RX ADMIN — ATORVASTATIN CALCIUM 80 MG: 40 TABLET, FILM COATED ORAL at 22:33

## 2023-03-06 RX ADMIN — INSULIN LISPRO 6 UNITS: 100 INJECTION, SOLUTION INTRAVENOUS; SUBCUTANEOUS at 16:49

## 2023-03-06 RX ADMIN — SODIUM CHLORIDE, PRESERVATIVE FREE 10 ML: 5 INJECTION INTRAVENOUS at 22:00

## 2023-03-06 RX ADMIN — SODIUM CHLORIDE, PRESERVATIVE FREE 10 ML: 5 INJECTION INTRAVENOUS at 20:40

## 2023-03-06 RX ADMIN — SODIUM CHLORIDE, PRESERVATIVE FREE 10 ML: 5 INJECTION INTRAVENOUS at 14:08

## 2023-03-06 RX ADMIN — INSULIN LISPRO 12 UNITS: 100 INJECTION, SOLUTION INTRAVENOUS; SUBCUTANEOUS at 08:44

## 2023-03-06 RX ADMIN — INSULIN LISPRO 9 UNITS: 100 INJECTION, SOLUTION INTRAVENOUS; SUBCUTANEOUS at 22:32

## 2023-03-06 RX ADMIN — SODIUM CHLORIDE 75 ML/HR: 9 INJECTION, SOLUTION INTRAVENOUS at 16:58

## 2023-03-06 RX ADMIN — AMLODIPINE BESYLATE 10 MG: 5 TABLET ORAL at 08:45

## 2023-03-06 RX ADMIN — CLOPIDOGREL BISULFATE 75 MG: 75 TABLET ORAL at 08:45

## 2023-03-06 RX ADMIN — CYANOCOBALAMIN TAB 1000 MCG 1000 MCG: 1000 TAB at 08:45

## 2023-03-06 RX ADMIN — ENOXAPARIN SODIUM 40 MG: 100 INJECTION SUBCUTANEOUS at 08:44

## 2023-03-06 RX ADMIN — LISINOPRIL 40 MG: 40 TABLET ORAL at 08:44

## 2023-03-06 RX ADMIN — INSULIN LISPRO 12 UNITS: 100 INJECTION, SOLUTION INTRAVENOUS; SUBCUTANEOUS at 12:36

## 2023-03-06 RX ADMIN — ASPIRIN 81 MG CHEWABLE TABLET 81 MG: 81 TABLET CHEWABLE at 08:44

## 2023-03-06 NOTE — PROGRESS NOTES
DC Plan: [facility] (need auth)    CM asked [facility] via [facility] to start [facility].

## 2023-03-06 NOTE — PROGRESS NOTES
Hospitalist Progress Note               Daily Progress Note: 3/6/2023      Subjective:   Hospital course to date:    Patient is a 77 yo female with a PMH of type 2 DM, HTN, previous smoker, and CVA that occurred in 2021 without residual symptoms who was admitted from the ED on 3/3 for slurred speech and and right-sided weakness. Exam shows right upper extremity drift and expressive aphasia. MRI of the head without contrast showed acute to subacute infarcts in the left cerebral hemisphere within the left frontoparietal deep white matter, and in a pattern suggestive of watershed infarction. Patient was admitted, kept n.p.o. initially. She was seen by speech therapy who did not feel she was safe for oral intake. NG tube was placed for medications and initiation of enteral nutrition    Discussion with son led to a DNR order  ---------------  Patient is seen for follow-up. Overall, clinical edition unchanged. Still flaccid paralysis on the right. Patient still drooling out of the right side of her mouth. Discussed with speech therapy this morning, they would like to wait 1 more day before trying modified barium swallow.   However, at this time it seems highly likely she will need a PEG tube      Problem List:  Problem List as of 3/6/2023 Date Reviewed: 3/3/2023            Codes Class Noted - Resolved    CVA (cerebral vascular accident) [facility]) ICD-10-CM: I63.9  ICD-9-CM: 434.91  3/3/2023 - Present        Acute right-sided weakness ICD-10-CM: R53.1  ICD-9-CM: 728.87  3/3/2023 - Present        Osteoporosis ICD-10-CM: M81.0  ICD-9-CM: 733.00  12/17/2022 - Present        Vitamin D deficiency ICD-10-CM: E55.9  ICD-9-CM: 268.9  12/17/2022 - Present        History of colon polyps ICD-10-CM: Z86.010  ICD-9-CM: V12.72  12/17/2022 - Present        Macrocytosis without anemia ICD-10-CM: D75.89  ICD-9-CM: 289.89  12/17/2022 - Present        Diabetes [facility] ([facility] ICD-10-CM: E11.9  ICD-9-CM: 250.00  3/11/2022 - Present        Benign essential HTN ICD-10-CM: I10  ICD-9-CM: 401.1  3/11/2022 - Present        Mixed hyperlipidemia ICD-10-CM: E78.2  ICD-9-CM: 272.2  3/11/2022 - Present        History of CVA (cerebrovascular accident) ICD-10-CM: Z86.73  ICD-9-CM: V12.54  3/11/2022 - Present        TIA (transient ischemic attack) ICD-10-CM: G45.9  ICD-9-CM: 435.9  12/8/2021 - Present        Slurred speech ICD-10-CM: R47.81  ICD-9-CM: 784.59  12/8/2021 - Present       Medications reviewed  Current Facility-Administered Medications   Medication Dose Route Frequency    enoxaparin (LOVENOX) injection 40 mg  40 mg SubCUTAneous Q24H    aspirin chewable tablet 81 mg  81 mg Oral DAILY    clopidogreL (PLAVIX) tablet 75 mg  75 mg Oral DAILY    cyanocobalamin tablet 1,000 mcg  1,000 mcg Oral DAILY    [Held by provider] insulin glargine (LANTUS) injection 14 Units  14 Units SubCUTAneous QHS    insulin lispro (HUMALOG) injection   SubCUTAneous AC&HS    glucose chewable tablet 16 g  4 Tablet Oral PRN    glucagon (GLUCAGEN) injection 1 mg  1 mg IntraMUSCular PRN    dextrose 10% infusion 0-250 mL  0-250 mL IntraVENous PRN    sodium chloride (NS) flush 5-40 mL  5-40 mL IntraVENous Q8H    sodium chloride (NS) flush 5-40 mL  5-40 mL IntraVENous PRN    acetaminophen (TYLENOL) tablet 650 mg  650 mg Oral Q6H PRN    Or    acetaminophen (TYLENOL) suppository 650 mg  650 mg Rectal Q6H PRN    polyethylene glycol (MIRALAX) packet 17 g  17 g Oral DAILY PRN    ondansetron (ZOFRAN ODT) tablet 4 mg  4 mg Oral Q8H PRN    Or    ondansetron (ZOFRAN) injection 4 mg  4 mg IntraVENous Q6H PRN    atorvastatin (LIPITOR) tablet 80 mg  80 mg Oral QHS    [Held by provider] alogliptin (NESINA) tablet 12.5 mg  12.5 mg Oral DAILY    And    [Held by provider] metFORMIN (GLUCOPHAGE) tablet 1,000 mg  1,000 mg Oral BID WITH MEALS    amLODIPine (NORVASC) tablet 10 mg  10 mg Oral DAILY    And    lisinopriL (PRINIVIL, ZESTRIL) tablet 40 mg  40 mg Oral DAILY    0.9% sodium chloride infusion  75 mL/hr IntraVENous CONTINUOUS       Review of Systems:   A comprehensive review of systems was negative except for that written in the HPI. Objective:   Physical Exam:     Visit Vitals  BP (!) 157/56 (BP 1 Location: Right upper arm)   Pulse (!) 103   Temp 98.7 °F (37.1 °C)   Resp 18   Ht 5' 3\" (1.6 m)   Wt 79.4 kg (175 lb)   SpO2 99%   BMI 31.00 kg/m²      O2 Device: None (Room air)    Temp (24hrs), Av.9 °F (37.2 °C), Min:97.4 °F (36.3 °C), Max:100.2 °F (37.9 °C)    No intake/output data recorded.  1901 -  0700  In: 150   Out: 400 [Urine:400]    General:   Awake and alert   Lungs:   Clear to auscultation bilaterally. Chest wall:  No tenderness or deformity. Heart:  Hypertensive. Regular rate and rhythm, S1, S2 normal, no murmur, click, rub or gallop. Abdomen:   Soft, non-tender. Bowel sounds normal. No masses,  No organomegaly. Extremities: Extremities normal, atraumatic, no cyanosis or edema. Pulses: 2+ and symmetric all extremities. Skin: Skin color, texture, turgor normal. No rashes or lesions   Neurologic: Facial droop on right side with drooling. Vibration and fine touch impaired in right lower extremity. Flaccid paralysis right arm and leg. Unable to indicate person, place, or time. Data Review:       Recent Days:  Recent Labs     23  0434 23  1059   WBC 7.0 7.5   HGB 11.1* 12.1   HCT 33.7* 36.4    356       Recent Labs     23  0434 23  1059   * 132*   K 4.2 4.6    102   CO2 22 25   * 276*   BUN 23* 26*   CREA 0.86 1.06*   CA 8.8 9.2   ALB 3.3* 3.7   TBILI 0.4 0.4   ALT 12 13   INR  --  1.0       No results for input(s): PH, PCO2, PO2, HCO3, FIO2 in the last 72 hours.     24 Hour Results:  Recent Results (from the past 24 hour(s))   GLUCOSE, POC    Collection Time: 23 11:12 AM   Result Value Ref Range    Glucose (POC) 293 (H) 65 - 100 mg/dL    Performed by Gerard MartinFlojin)    GLUCOSE, POC Collection Time: 03/05/23  3:59 PM   Result Value Ref Range    Glucose (POC) 245 (H) 65 - 100 mg/dL    Performed by Chloe Hampton (Float)    GLUCOSE, POC    Collection Time: 03/05/23  7:26 PM   Result Value Ref Range    Glucose (POC) 255 (H) 65 - 100 mg/dL    Performed by Riley Pickering    GLUCOSE, POC    Collection Time: 03/06/23  7:19 AM   Result Value Ref Range    Glucose (POC) 325 (H) 65 - 100 mg/dL    Performed by Johnson JOYA        XR CHEST PORT   Final Result   Satisfactory NG tube placement. No Acute Disease. DUPLEX CAROTID BILATERAL   Final Result      MRI BRAIN WO CONT   Final Result      1. Multiple acute to subacute infarcts in the left cerebral hemisphere   predominantly within the left frontoparietal deep white matter, and in a pattern   suggestive of watershed infarction. 2. Minimal chronic microvascular ischemic disease. Chronic infarcts in the   bilateral basal ganglia, left larger than right, and left cerebellum. XR CHEST PORT   Final Result   No acute process identified. CTA CODE NEURO HEAD AND NECK W CONT   Final Result   CTA Head:   1. No evidence of flow-limiting stenosis or aneurysm. Scattered atherosclerotic   disease as above, including severe stenosis in the right P2 segment, which is   unchanged. CTA Neck:   1. No evidence of flow-limiting stenosis. 2. Evaluation of the carotid bifurcations is limited by motion, though there are   suspected moderate stenoses of the bilateral proximal internal carotid arteries,   right worse than left. CT CODE NEURO HEAD WO CONTRAST   Final Result   Chronic ischemic changes. No acute findings. XR SWALLOW FUNC VIDEO    (Results Pending)        Assessment:    Acute ischemic stroke left frontal parietal hemisphere and a watershed distribution.   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 I have reviewed patient's presenting subjective and objective findings, as well as all laboratory studies, imaging studies, and vital signs to date as well as treatment rendered and patient's response to those treatments. In addition, prior medical, surgical and relevant social and family histories were reviewed. Plan:  Continue aspirin and high intensity statin via NG tube  Keep n.p.o. Continue tube feeds  MBS tomorrow if patient able    She will likely need a PEG tube    Care Plan discussed with: Patient/Family    Code status: DNR    Social determinants of health: none known    Disposition/barriers to discharge: continue inpatient care    Total time spent with patient: 30 minutes.     Shirley Munguia MD

## 2023-03-06 NOTE — PROGRESS NOTES
Comprehensive Nutrition Assessment    Type and Reason for Visit: Initial (New TF)    Nutrition Recommendations/Plan:   NPO, advance per SLP orders. Continue TF via Dobbhoff as Glucerna 1.5 Rupert at goal rate of 50 mL/hr. Continue water flushes as 150 mL H2O Q4H via Dobbhoff. Provides 1800 kcal, 99 gm PRO, 1812 mL Fluids (meets >100% of EENs). Monitor and document PO intakes, TF rate, flushes, tolerance, BMs in I/Os. Malnutrition Assessment:  Malnutrition Status: At risk for malnutrition (specify) (Mild) (03/06/23 1227)    Context:  Acute illness     Findings of the 6 clinical characteristics of malnutrition:   Energy Intake:  Mild decrease in energy intake (specify) (NPO x2-3 days, unsure of EN infusion)  Weight Loss:  Unable to assess     Body Fat Loss:  No significant body fat loss,     Muscle Mass Loss:  No significant muscle mass loss,    Fluid Accumulation:  No significant fluid accumulation,     Strength:  Not performed     Nutrition Assessment:    Admitted for CVA w/ R. Hemiparesis. Screened for new TF orders. RD reviewed chart, noted SLP rec'd MBS and NST to meet EENs; Dobbhoff placed 3/4. (3/6) TF running as Gluc 1.5 Rupert at 50 mL/hr. PO diet advancement pending rescheduled MBS results on 3/7. Current TF regimen meets >75% of EENs, continue regimen. Labs: , A1c 10.3, POC -325 mg/dL. Meds: NS @ 75, B12, lisinopril, humalog, lipitor, norvasc. Nutrition Related Findings:    NFPE w/o acute findings, well nourished. No N/V/D reported. +moderate-severe oropharyngeal dysphagia; SLP tx ongoing, rec'd NPO w/ TF as sole nutrition source until MBS completed. Last BM PTA, C? No edema.  Wound Type: None    Current Nutrition Intake & Therapies:  Average Meal Intake: NPO  Average Supplement Intake: NPO  ADULT TUBE FEEDING Nasogastric; Diabetic; Delivery Method: Continuous; Continuous Initial Rate (mL/hr): 25; Continuous Advance Tube Feeding: Yes; Advancement Volume (mL/hr): 10; Advancement Frequency: Q 4 hours; Continuous Goal Rate (mL/hr): 50; W... DIET NPO    Anthropometric Measures:  Height: 5' 3\" (160 cm)  Ideal Body Weight (IBW): 115 lbs (52 kg)  Current Body Wt:  79.4 kg (175 lb 0.7 oz), 152.2 % IBW. Stated  Current BMI (kg/m2): 31  Usual Body Weight:  (UTO)  Weight Adjustment: No adjustment  BMI Category: Obese class 1 (BMI 30.0-34. 9)    Estimated Daily Nutrient Needs:  Energy Requirements Based On: Kcal/kg  Weight Used for Energy Requirements: Adjusted  Energy (kcal/day): 0534-1685 kcal/d  (25-30 kcal/kg AdjBW)  Weight Used for Protein Requirements: Current  Protein (g/day): 95 gm/d  Method Used for Fluid Requirements: 1 ml/kcal  Fluid (ml/day): 0742-4333 mL/d    Nutrition Diagnosis:   Inadequate oral intake related to swallowing difficulty as evidenced by NPO or clear liquid status due to medical condition, nutrition support-enteral nutrition    Nutrition Interventions:   Food and/or Nutrient Delivery: Start oral diet, Continue tube feeding  Nutrition Education/Counseling: No recommendations at this time  Coordination of Nutrition Care: Continue to monitor while inpatient  Plan of Care discussed with: RN, Pt, PCT    Goals:  Goals: Meet at least 75% of estimated needs, Tolerate nutrition support at goal rate, within 7 days    Nutrition Monitoring and Evaluation:   Behavioral-Environmental Outcomes: None identified  Food/Nutrient Intake Outcomes: Diet advancement/tolerance, Enteral nutrition intake/tolerance  Physical Signs/Symptoms Outcomes: Biochemical data, Meal time behavior, Chewing or swallowing, Weight    Discharge Planning: Too soon to determine    Brain VIDAL Ross  Contact: ext. [phone].

## 2023-03-06 NOTE — CONSULTS
Rehab Consult    Patient: Ida Parnell MRN: [id number]  SSN: [id number]    YOB: 1945  Age: 78 y.o. Sex: female      Consulting provider: Abe Sanford  Reason for consult: Evaluate for rehab needs     Admit date: 3/3/2023  LOS (days): 3    CC: Difficulty walking, dysphagia    Subjective: This is a 78 y.o. female with a pmh including va, DM, htn, hld. Presented to [facility] with Slurred speech and Right sided hemiparesis. MRI showed acute left cerebral hemisphere infarcts, pattern suggestive of watershed infarcts. Patient with NG tube during visit today, noted Right side flaccid. Currently discussion regarding possible peg tube. Functional History -   Baseline: Independent with mobility/transfers/ADLs. Current: Patient currently functioning at a mod to max assist for most transfers, ADLs, and mobility.     Living situation: Lives with family    Past Medical History:   Diagnosis Date    Diabetes ([facility] [address]     Hypercholesterolemia     Hypertension     Stroke [facility])      Past Surgical History:   Procedure Laterality Date    HX BACK SURGERY      HX CARPAL TUNNEL RELEASE      HX CATARACT REMOVAL Bilateral     HX ORTHOPAEDIC      ankle      Family History   Problem Relation Age of Onset    Hypertension Mother     Heart Surgery Father     Immune Disorder Sister      Social History     Tobacco Use    Smoking status: Never    Smokeless tobacco: Never   Substance Use Topics    Alcohol use: Not Currently      Current Facility-Administered Medications   Medication    enoxaparin (LOVENOX) injection 40 mg    aspirin chewable tablet 81 mg    clopidogreL (PLAVIX) tablet 75 mg    cyanocobalamin tablet 1,000 mcg    [Held by provider] insulin glargine (LANTUS) injection 14 Units    insulin lispro (HUMALOG) injection    glucose chewable tablet 16 g    glucagon (GLUCAGEN) injection 1 mg    dextrose 10% infusion 0-250 mL    sodium chloride (NS) flush 5-40 mL    sodium chloride (NS) flush 5-40 mL    acetaminophen (TYLENOL) tablet 650 mg    Or    acetaminophen (TYLENOL) suppository 650 mg    polyethylene glycol (MIRALAX) packet 17 g    ondansetron (ZOFRAN ODT) tablet 4 mg    Or    ondansetron (ZOFRAN) injection 4 mg    atorvastatin (LIPITOR) tablet 80 mg    [Held by provider] alogliptin (NESINA) tablet 12.5 mg    And    [Held by provider] metFORMIN (GLUCOPHAGE) tablet 1,000 mg    amLODIPine (NORVASC) tablet 10 mg    And    lisinopriL (PRINIVIL, ZESTRIL) tablet 40 mg    0.9% sodium chloride infusion        No Known Allergies    Review of Systems:  Positive for fatigue. Otherwise a complete review of systems was negative except as noted above in HPI.      Objective:     Vitals:    03/06/23 0229 03/06/23 0715 03/06/23 1228 03/06/23 1602   BP: (!) 136/56 (!) 157/56  (!) 149/68   Pulse: 99 (!) 103  (!) 109   Resp: 20 18  18   Temp: 98.5 °F (36.9 °C) 98.7 °F (37.1 °C)  98.8 °F (37.1 °C)   SpO2: 96% 99%  95%   Weight:       Height:   5' 3\" (1.6 m)         Physical Exam:  General: NAD, pleasant and cooperative   HEENT: anicteric, moist oral mucosa   CV: RRR, no murmurs, 2+ pulses   Respiratory: clear and aerating well, no increased WOB  Abdomen: nondistended, nontender, NG tube  Extremities: no peripheral edema   Musculoskeletal: moving extremities at least antigravity, Right LE/UE 0/5  Neuro: alert, normal speech, Right hemiparesis, dysarthria, dysphagia CN 2-12 intact, sensation intact to light touch     Labs:  Recent Results (from the past 24 hour(s))   GLUCOSE, POC    Collection Time: 03/05/23  7:26 PM   Result Value Ref Range    Glucose (POC) 255 (H) 65 - 100 mg/dL    Performed by Pavithra Zhou    GLUCOSE, POC    Collection Time: 03/06/23  7:19 AM   Result Value Ref Range    Glucose (POC) 325 (H) 65 - 100 mg/dL    Performed by Thelma Schmid    GLUCOSE, POC    Collection Time: 03/06/23 11:50 AM   Result Value Ref Range    Glucose (POC) 321 (H) 65 - 100 mg/dL    Performed by Anjana 150, POC    Collection Time: 03/06/23  4:09 PM   Result Value Ref Range    Glucose (POC) 237 (H) 65 - 100 mg/dL    Performed by Rody Seymour          Imaging:  MRI BRAIN WO CONT    Result Date: 3/3/2023  1. Multiple acute to subacute infarcts in the left cerebral hemisphere predominantly within the left frontoparietal deep white matter, and in a pattern suggestive of watershed infarction. 2. Minimal chronic microvascular ischemic disease. Chronic infarcts in the bilateral basal ganglia, left larger than right, and left cerebellum. XR CHEST PORT    Result Date: 3/4/2023  Satisfactory NG tube placement. No Acute Disease. XR CHEST PORT    Result Date: 3/3/2023  No acute process identified. CTA CODE NEURO HEAD AND NECK W CONT    Result Date: 3/3/2023  CTA Head: 1. No evidence of flow-limiting stenosis or aneurysm. Scattered atherosclerotic disease as above, including severe stenosis in the right P2 segment, which is unchanged. CTA Neck: 1. No evidence of flow-limiting stenosis. 2. Evaluation of the carotid bifurcations is limited by motion, though there are suspected moderate stenoses of the bilateral proximal internal carotid arteries, right worse than left. CT CODE NEURO HEAD WO CONTRAST    Result Date: 3/3/2023  Chronic ischemic changes. No acute findings. Impression/Plan:     Diagnoses:     CVA left frontal watershed distribution  Dysphagia  Dysarthria  Right hemiparesis  HTN  HLD  DM2    This patient would benefit from participation in an intensive inpatient rehabilitation program. She needs ongoing intense rehab and stroke education as well as prevention. This patient can likely tolerate 3 hours of therapy per day. Discussed with patient     Barriers to rehab: Definitive feeding plan, Possible peg tube, medical clearance    [ x ] Will need submission for insurance authorization for inpatient rehab. Continue PT/OT in the acute setting. Will continue to follow.        Signed By: Isabelle Saavedra NP     March 6, 2023    Physical Medicine and Rehabilitation

## 2023-03-06 NOTE — PROGRESS NOTES
Problem: Pressure Injury - Risk of  Goal: *Prevention of pressure injury  Description: Document Nitin Scale and appropriate interventions in the flowsheet. Outcome: Progressing Towards Goal  Note: Pressure Injury Interventions:  Sensory Interventions: Assess changes in LOC, Minimize linen layers, Turn and reposition approx. every two hours (pillows and wedges if needed)    Moisture Interventions: Absorbent underpads, Internal/External urinary devices    Activity Interventions: PT/OT evaluation, Increase time out of bed    Mobility Interventions: HOB 30 degrees or less    Nutrition Interventions: Document food/fluid/supplement intake    Friction and Shear Interventions: Apply protective barrier, creams and emollients, HOB 30 degrees or less                Problem: Patient Education: Go to Patient Education Activity  Goal: Patient/Family Education  Outcome: Progressing Towards Goal     Problem: Falls - Risk of  Goal: *Absence of Falls  Description: Document Jennifer Fall Risk and appropriate interventions in the flowsheet.   Outcome: Progressing Towards Goal  Note: Fall Risk Interventions:       Mentation Interventions: Bed/chair exit alarm, Increase mobility         Elimination Interventions: Bed/chair exit alarm              Problem: Patient Education: Go to Patient Education Activity  Goal: Patient/Family Education  Outcome: Progressing Towards Goal     Problem: Patient Education: Go to Patient Education Activity  Goal: Patient/Family Education  Outcome: Progressing Towards Goal     Problem: TIA/CVA Stroke: 0-24 hours  Goal: Off Pathway (Use only if patient is Off Pathway)  Outcome: Progressing Towards Goal  Goal: Activity/Safety  Outcome: Progressing Towards Goal  Goal: Consults, if ordered  Outcome: Progressing Towards Goal  Goal: Diagnostic Test/Procedures  Outcome: Progressing Towards Goal  Goal: Nutrition/Diet  Outcome: Progressing Towards Goal  Goal: Discharge Planning  Outcome: Progressing Towards Goal  Goal: Medications  Outcome: Progressing Towards Goal  Goal: Respiratory  Outcome: Progressing Towards Goal  Goal: Treatments/Interventions/Procedures  Outcome: Progressing Towards Goal  Goal: Minimize risk of bleeding post-thrombolytic infusion  Outcome: Progressing Towards Goal  Goal: Monitor for complications post-thrombolytic infusion  Outcome: Progressing Towards Goal  Goal: Psychosocial  Outcome: Progressing Towards Goal  Goal: *Hemodynamically stable  Outcome: Progressing Towards Goal  Goal: *Neurologically stable  Description: Absence of additional neurological deficits    Outcome: Progressing Towards Goal  Goal: *Verbalizes anxiety and depression are reduced or absent  Outcome: Progressing Towards Goal  Goal: *Absence of Signs of Aspiration on Current Diet  Outcome: Progressing Towards Goal  Goal: *Absence of deep venous thrombosis signs and symptoms(Stroke Metric)  Outcome: Progressing Towards Goal  Goal: *Ability to perform ADLs and demonstrates progressive mobility and function  Outcome: Progressing Towards Goal  Goal: *Stroke education started(Stroke Metric)  Outcome: Progressing Towards Goal  Goal: *Dysphagia screen performed(Stroke Metric)  Outcome: Progressing Towards Goal  Goal: *Rehab consulted(Stroke Metric)  Outcome: Progressing Towards Goal     Problem: Patient Education: Go to Patient Education Activity  Goal: Patient/Family Education  Description: Patient to participate in automatic speech tasks: count 1-10 with 60% accuracy  Patient to name/label environmental items with 60% accuracy. Patient to ID from a F:2 with 60% accuracy. Patient to follow 1 step commands with 60% accuracy. Patient orientation x4 with external aids.    Outcome: Progressing Towards Goal     Problem: Patient Education: Go to Patient Education Activity  Goal: Patient/Family Education  Outcome: Progressing Towards Goal     Problem: Delirium Treatment  Goal: *Level of consciousness restored to baseline  Outcome: Progressing Towards Goal  Goal: *Level of environmental perceptions restored to baseline  Outcome: Progressing Towards Goal  Goal: *Sensory perception restored to baseline  Outcome: Progressing Towards Goal  Goal: *Emotional stability restored to baseline  Outcome: Progressing Towards Goal  Goal: *Functional assessment restored to baseline  Outcome: Progressing Towards Goal  Goal: *Absence of falls  Outcome: Progressing Towards Goal  Goal: *Will remain free of delirium, CAM Score negative  Outcome: Progressing Towards Goal  Goal: *Cognitive status will be restored to baseline  Outcome: Progressing Towards Goal  Goal: Interventions  Outcome: Progressing Towards Goal     Problem: Patient Education: Go to Patient Education Activity  Goal: Patient/Family Education  Outcome: Progressing Towards Goal     Problem: Patient Education: Go to Patient Education Activity  Goal: Patient/Family Education  Outcome: Progressing Towards Goal     Problem: Diabetes Self-Management  Goal: *Disease process and treatment process  Description: Define diabetes and identify own type of diabetes; list 3 options for treating diabetes. Outcome: Progressing Towards Goal  Goal: *Incorporating nutritional management into lifestyle  Description: Describe effect of type, amount and timing of food on blood glucose; list 3 methods for planning meals. Outcome: Progressing Towards Goal  Goal: *Incorporating physical activity into lifestyle  Description: State effect of exercise on blood glucose levels. Outcome: Progressing Towards Goal  Goal: *Developing strategies to promote health/change behavior  Description: Define the ABC's of diabetes; identify appropriate screenings, schedule and personal plan for screenings. Outcome: Progressing Towards Goal  Goal: *Using medications safely  Description: State effect of diabetes medications on diabetes; name diabetes medication taking, action and side effects.   Outcome: Progressing Towards Goal  Goal: *Monitoring blood glucose, interpreting and using results  Description: Identify recommended blood glucose targets  and personal targets. Outcome: Progressing Towards Goal  Goal: *Prevention, detection, treatment of acute complications  Description: List symptoms of hyper- and hypoglycemia; describe how to treat low blood sugar and actions for lowering  high blood glucose level. Outcome: Progressing Towards Goal  Goal: *Prevention, detection and treatment of chronic complications  Description: Define the natural course of diabetes and describe the relationship of blood glucose levels to long term complications of diabetes.   Outcome: Progressing Towards Goal  Goal: *Developing strategies to address psychosocial issues  Description: Describe feelings about living with diabetes; identify support needed and support network  Outcome: Progressing Towards Goal  Goal: *Insulin pump training  Outcome: Progressing Towards Goal  Goal: *Sick day guidelines  Outcome: Progressing Towards Goal  Goal: *Patient Specific Goal (EDIT GOAL, INSERT TEXT)  Outcome: Progressing Towards Goal     Problem: Patient Education: Go to Patient Education Activity  Goal: Patient/Family Education  Outcome: Progressing Towards Goal     Problem: Patient Education: Go to Patient Education Activity  Goal: Patient/Family Education  Outcome: Progressing Towards Goal

## 2023-03-06 NOTE — PROGRESS NOTES
Problem: Mobility Impaired (Adult and Pediatric)  Goal: *Acute Goals and Plan of Care (Insert Text)  Description: FUNCTIONAL STATUS PRIOR TO ADMISSION: Patient was modified independent using a single point cane for functional mobility. HOME SUPPORT PRIOR TO ADMISSION: The patient lived with 8 years old mother but did not require assist.    Patient stated goal: none verbalized  Patient will move from supine to sit and sit to supine , scoot up and down, and roll side to side in bed with moderate assistance  within 7 day(s). Patient will transfer from bed to chair and chair to bed with moderate assistance  using the least restrictive device within 7 day(s). Patient will improve static sitting balance to minimal assistance within 1 week(s). Patient will ambulate 10 feet with moderate assistance with least restrictive device within 1 weeks. Outcome: Progressing Towards Goal   PHYSICAL THERAPY TREATMENT  Patient: Angelica Farias (71 y.o. female)  Date: 3/6/2023  Diagnosis: [facility] [I63.9] <principal problem not specified>      Precautions: In place during session: Peripheral IV, EKG/telemetry , and Nasogastric Tube  Chart, physical therapy assessment, plan of care and goals were reviewed. ASSESSMENT  Patient continues with skilled PT services and is progressing towards goals. Pt found semi upon PTA/KOHLER arrival, agreeable to session. (See below for objective details and assist levels). Overall pt tolerated session fair today with bed mobility, seated therex and sitting EOB, limited by activity tolerance and endurance secondary to SLP treatment. Pt able to perform sup>sit with less A, able to initiate RLE mobility however continues to require A to transfer to EOB. Pt able to tolerate sitting EOB ~15 mins with Min A-CGA to maintain balance despite LUE support on bed rail, demo's LAQ ankle pumps and marching with increased mobility noted on RLE.  Pt reports fatigue and requested to return supine requiring increased A, able to tolerate PROM. Pt noted to demo R sided neglect throughout session requiring VC/TC for tracking. Pt demo's good rehab potential, is highly motivated to participate in session and improve strength, balance and endurance to return to PLOF and promote functional independence. Will continue to benefit from skilled PT services, and will continue to progress as tolerated. Current Level of Function Impacting Discharge (mobility/balance): x2 A for mobility, sitting balance    Other factors to consider for discharge: PLOF          PLAN :  Patient continues to benefit from skilled intervention to address the above impairments. Continue treatment per established plan of care to address goals. Recommend with staff: Encourage HEP in prep for ADLs/mobility and Frequent repositioning to prevent skin breakdown    Recommendation for discharge: (in order for the patient to meet his/her long term goals)  [facility]     This discharge recommendation:  Has been made in collaboration with the attending provider and/or case management    IF patient discharges home will need the following DME: to be determined (TBD)       SUBJECTIVE:   Patient stated I'm tired.     OBJECTIVE DATA SUMMARY:   Critical Behavior:  Neurologic State: Alert, Eyes open spontaneously  Orientation Level: Oriented to person, Oriented to place  Cognition: Follows commands, Poor safety awareness  Safety/Judgement: Decreased awareness of environment, Decreased awareness of need for assistance, Decreased awareness of need for safety, Decreased insight into deficits  Functional Mobility Training:  Bed Mobility:  Rolling: Minimum assistance;Assist x2  Supine to Sit: Minimum assistance; Moderate assistance;Assist x2; Additional time  Sit to Supine: Moderate assistance;Assist x2;Maximum assistance  Scooting: Assist x2; Moderate assistance  Balance:  Sitting: Impaired;High guard; With support  Sitting - Static: Fair (occasional); Prop sitting;Poor (constant support)  Sitting - Dynamic: Poor (constant support)  Ambulation/Gait Training:      Therapeutic Exercises:       EXERCISE   Sets   Reps   Active Active Assist   Passive Self ROM   Comments   Ankle Pumps 1 6 [x] [] [] []    Quad Sets/Glut Sets   [] [] [] []    Hamstring Sets   [] [] [] []    Short Arc Quads   [] [] [] []    Heel Slides   [] [] [x] [] supine   Straight Leg Raises   [] [] [] []    Hip abd/add   [] [] [x] [] supine   Long Arc Quads   [x] [] [] []    Marching   [x] [] [] []       [] [] [] []       Pain Rating:  Pt denies pain     Activity Tolerance:   Fair and requires rest breaks    After treatment patient left in no apparent distress:   Bed locked and returned to lowest position, Supine in bed, Call bell within reach, and Side rails x 3    COMMUNICATION/COLLABORATION:   The patients plan of care was discussed with: Occupational therapy assistant and Registered nurse. PT/OT sessions occurred together for increased safety of pt and clinician.      Niki Wolf, PTA, PT   Time Calculation: 38 mins

## 2023-03-06 NOTE — PROGRESS NOTES
Problem: Dysphagia (Adult)  Goal: *Acute Goals and Plan of Care (Insert Text)  Description: Speech Therapy Swallow Goals  Initiated 3/3/2023  -Patient will tolerate full diet with moderately thick liquids without clinical indicators of aspiration given moderate cues within 7 day(s). Not Met        -Patient will tolerate PO trials without clinical indicators of aspiration given moderate cues within 7 day(s). -Patient will participate in modified barium swallow study within 7 day(s). -Patient will demonstrate understanding of swallow safety precautions and aspiration precautions, diet recs with moderate cues within 7 day(s). -Patient will participate in speech language evaluation.     -Patient/caregiver goal: eat safely  Outcome: Progressing Towards Goal     Problem: Communication Impaired (Adult)  Goal: *Acute Goals and Plan of Care (Insert Text)  Description: Patient will name objects with 60% accuracy without cues within 7 days. Patient will follow 2 step commands with min cues within 7 days. Patient will demonstrate 80% accuracy with complex yes/no questions with min cues within 7 days. Patient will demonstrate use of dysarthria strategies with mod cues within 7 days. Outcome: Not Met   SPEECH LANGUAGE PATHOLOGY DYSPHAGIA AND SPEECH TREATMENT  Patient: Luz Marina Lara (71 y.o. female)  Date: 3/6/2023  Diagnosis: [facility] [I63.9] <principal problem not specified>      Precautions: aspiration      ASSESSMENT:  Pt seen for follow-up speech and sw tx.  1:1 sitter is present. Pt sleeping but easily arousable. Sw tx: Pt now with NG tube, reportedly tolerating TF. Right facial droop persists with oral spillage following PO trials. Pt given trials of ice, thin via 1/2 tsp and puree. Oral phase with weak labial closure, right weakness greater than left, reduced lingual propulsion but pt is able to clear oral cavity. Mild-mod right oral spillage. Pharyngeal phase with persistent mild-mod delay, appears largely [place] once initiated. Overall, pt is weak with reduced endurance and concerns for adequate nutritional intake are present. ST tx: based on prior report, pt's speech production is improved but remains dysarthric (moderate). Auto speech tasks 100% with prompts for initiation and reduced articulatory precision. Confrontational naming 80% with semantic cues (0% without cues), Simple  yes/ no questions 100%, complex yes/ no questions 30%. Inconsistent 1-step command following. Conversational responses include intermittent appropriate phrase level responses. Pt with difficulty expanding responses. Reviewed dysarthria strategies with pt, needs reinforcement. Pt is noted to have an episode of fixed gaze last ing ~ 20 seconds. Dr. Argenis Rivera was made aware. After this episode, pt returned to appropriate engagement and responsiveness. PLAN:  Recommendations and Planned Interventions: At this time, pt participates well in tx; however, weakness and fatigue negatively impact pt's endurance. Concerns for inadequate nutritional intake are also present. Pt is pending MBS; however, will hold MBS this date and plan for MBS 3-7-2023 if pt appropriate to participate. Pt may require PEG placement if unable to tolerate PO diet safety if medically appropriate and if in agreement with pt/family wishes. Cont NPO with NG TF, MBS pending, cont SLP tx for aphasia, dysarthria and dysphagia. Patient continues to benefit from skilled intervention to address the above impairments. Continue treatment per established plan of care.   Discharge Recommendations:  Inpatient Rehab     SUBJECTIVE:   Patient drowsy, arousable, agreeable    OBJECTIVE:   Cognitive and Communication Status:  Neurologic State: Alert, Eyes open spontaneously  Orientation Level: Oriented to person, Disoriented to place, Disoriented to situation, Disoriented to time  Cognition: Poor safety awareness  Perception: Cues to attend to right side of body, Cues to attend right visual field  Perseveration: No perseveration noted  Safety/Judgement: Decreased awareness of environment, Decreased awareness of need for assistance, Decreased awareness of need for safety, Decreased insight into deficits    Pain:  Pain Scale 1: Visual  Pain Intensity 1: 0  Pain Location 1: Generalized    After treatment:   Patient left in no apparent distress in bed, Call bell within reach, Nursing notified, Bed / chair alarm activated, and 1:1 sitter present    COMMUNICATION/EDUCATION:   Patient was educated regarding her deficit(s) of aphasia, dysarthria, dysphagia as this relates to her diagnosis of CVA. She demonstrated Fair understanding as evidenced by verbal responsiveness, needs reinforcement. The patient's plan of care including recommendations, planned interventions, and recommended diet changes were discussed with: Registered nurse and Physician.        Rocio Perez M.S. CCC-SLP  Time Calculation: 24 mins

## 2023-03-06 NOTE — PROGRESS NOTES
OCCUPATIONAL THERAPY TREATMENT  Patient: Shamir Ireland (71 y.o. female)  Date: 3/6/2023  Diagnosis: CVA (cerebral vascular accident) ([facility] [address] [I63.9] <principal problem not specified>      Precautions: In place during session: Peripheral IV, External Catheter, and Nasogastric Tube  Chart, occupational therapy assessment, plan of care, and goals were reviewed. ASSESSMENT  Pt continues with skilled OT/PT services and is progressing towards goals. Pt received semi-supine in bed upon arrival, AXO x2 and agreeable to PTA/KOHLER tx at this time. Overall, pt continues to present with deficits in generalized strength/AROM, coordination, bed mobility, static/dynamic sitting and standing balance and functional activity tolerance during performance of ADLs/mobility (see below for objective details and assist levels). Pt tolerated session fair, pt limited by activity tolerance and right side neglect. Pt attempted to comb hair while sitting eob. Pt only able to comb left side , mod vc and tactile cues to comb right side w/ no carryover. PROM completed on R UE and AROM on L UE. Pt unable to complete crossing midline activities. Pt required constant vc's to look to right side. Pt very pleasant and cooperative during session and appears motivated to improve functional levels. Will continue to progress. Recommend d/c to [address]  once medically appropriate. Other factors to consider for discharge: PLOF, time since onset, severity of deficits, and decline from functional baseline. PLAN :  Patient continues to benefit from skilled intervention to address the above impairments. Continue treatment per established plan of care. to address goals.     Recommend with staff: Encourage HEP in prep for ADLs/mobility    Recommend next session: Seated grooming    Recommendation for discharge: (in order for the patient to meet his/her long term goals)  [address]     This discharge recommendation:  Has been made in collaboration with the attending provider and/or case management    IF patient discharges home will need the following DME: TBD       SUBJECTIVE:   Patient stated I'm in a hospital.    OBJECTIVE DATA SUMMARY:   Cognitive/Behavioral Status:  Neurologic State: Alert;Eyes open spontaneously  Orientation Level: Oriented to person;Oriented to place  Cognition: Follows commands;Poor safety awareness  Perception: Cues to attend right visual field;Cues to attend to right side of body      Functional Mobility and Transfers for ADLs:  Bed Mobility:  Rolling: Minimum assistance;Assist x2  Supine to Sit: Minimum assistance; Moderate assistance;Assist x2; Additional time  Sit to Supine: Moderate assistance;Assist x2;Maximum assistance  Scooting: Assist x2; Moderate assistance    Balance:  Sitting: Impaired;High guard; With support  Sitting - Static: Fair (occasional); Prop sitting;Poor (constant support)  Sitting - Dynamic: Poor (constant support)    ADL Intervention:    Grooming  Position Performed: Seated edge of bed  Brushing/Combing Hair: Moderate assistance      Therapeutic Exercises:   Exercise Sets Reps AROM AAROM PROM Self PROM Comments   Shoulder flex/ext 1 10 [x] [] [x] [] PROM on Right side   Elbow flex/ext 1 10 [x] [] [x] []    Wrist flex/ext 1 10 [x] [] [x] []    Hand flex/ ext 1 10 [x] [] [x] []        Pain:  No c/o pain    Activity Tolerance:   Fair and requires frequent rest breaks    After treatment patient left in no apparent distress:   Supine in bed, Call bell within reach, Bed / chair alarm activated, Caregiver / family present, and Side rails x 3, bed locked and in lowest position    COMMUNICATION/COLLABORATION:   The patients plan of care was discussed with: Physical therapy assistant and Registered nurse. PT/OT sessions occurred together for increased safety of pt and clinician.      Reford TUYET Hernandes  Time Calculation: 38 mins     Problem: Self Care Deficits Care Plan (Adult)  Goal: *Acute Goals and Plan of Care (Insert Text)  Description: FUNCTIONAL STATUS PRIOR TO ADMISSION: Per medical chart, pt was IND w/ ADLs and functional mobility. HOME SUPPORT: pt lives with mother who is 8 years old.     Occupational Therapy Goals  Initiated 3/5/2023    Pt stated goal not appropriate at this time   Pt will be mod A sup <> sit in prep for EOB ADLs  Pt will be CGA grooming sitting at EOB LRAD  Pt will be min a UB dressing sitting EOB/long sit   Pt will be max A  LE dressing sitting EOB/long sit  Pt will be min A following UE HEP in prep for self care tasks   *will add additional goals as appropriate  Outcome: Progressing Towards Goal

## 2023-03-07 ENCOUNTER — APPOINTMENT (OUTPATIENT)
Dept: GENERAL RADIOLOGY | Age: 78
DRG: 065 | End: 2023-03-07
Attending: INTERNAL MEDICINE
Payer: MEDICARE

## 2023-03-07 LAB
GLUCOSE BLD STRIP.AUTO-MCNC: 188 MG/DL (ref 65–100)
GLUCOSE BLD STRIP.AUTO-MCNC: 229 MG/DL (ref 65–100)
GLUCOSE BLD STRIP.AUTO-MCNC: 322 MG/DL (ref 65–100)
GLUCOSE BLD STRIP.AUTO-MCNC: 343 MG/DL (ref 65–100)
GLUCOSE BLD STRIP.AUTO-MCNC: 345 MG/DL (ref 65–100)
PERFORMED BY, TECHID: ABNORMAL

## 2023-03-07 PROCEDURE — 74011250636 HC RX REV CODE- 250/636: Performed by: INTERNAL MEDICINE

## 2023-03-07 PROCEDURE — 74011250636 HC RX REV CODE- 250/636: Performed by: HOSPITALIST

## 2023-03-07 PROCEDURE — 74011000250 HC RX REV CODE- 250: Performed by: NURSE PRACTITIONER

## 2023-03-07 PROCEDURE — 82962 GLUCOSE BLOOD TEST: CPT

## 2023-03-07 PROCEDURE — 65270000029 HC RM PRIVATE

## 2023-03-07 PROCEDURE — 74011000250 HC RX REV CODE- 250: Performed by: HOSPITALIST

## 2023-03-07 PROCEDURE — 74011250637 HC RX REV CODE- 250/637: Performed by: NURSE PRACTITIONER

## 2023-03-07 PROCEDURE — 74011636637 HC RX REV CODE- 636/637: Performed by: NURSE PRACTITIONER

## 2023-03-07 PROCEDURE — 97530 THERAPEUTIC ACTIVITIES: CPT

## 2023-03-07 PROCEDURE — 92611 MOTION FLUOROSCOPY/SWALLOW: CPT

## 2023-03-07 PROCEDURE — 92526 ORAL FUNCTION THERAPY: CPT

## 2023-03-07 PROCEDURE — 74011250636 HC RX REV CODE- 250/636: Performed by: NURSE PRACTITIONER

## 2023-03-07 PROCEDURE — 74230 X-RAY XM SWLNG FUNCJ C+: CPT

## 2023-03-07 RX ORDER — HYDRALAZINE HYDROCHLORIDE 20 MG/ML
20 INJECTION INTRAMUSCULAR; INTRAVENOUS
Status: DISCONTINUED | OUTPATIENT
Start: 2023-03-07 | End: 2023-03-09 | Stop reason: HOSPADM

## 2023-03-07 RX ORDER — SODIUM CHLORIDE 9 MG/ML
25 INJECTION, SOLUTION INTRAVENOUS CONTINUOUS
Status: DISPENSED | OUTPATIENT
Start: 2023-03-07 | End: 2023-03-07

## 2023-03-07 RX ADMIN — BARIUM SULFATE 20 ML: 0.81 POWDER, FOR SUSPENSION ORAL at 10:09

## 2023-03-07 RX ADMIN — SODIUM CHLORIDE, PRESERVATIVE FREE 10 ML: 5 INJECTION INTRAVENOUS at 05:56

## 2023-03-07 RX ADMIN — BARIUM SULFATE 30 ML: 400 PASTE ORAL at 10:10

## 2023-03-07 RX ADMIN — SODIUM CHLORIDE 25 ML/HR: 9 INJECTION, SOLUTION INTRAVENOUS at 10:48

## 2023-03-07 RX ADMIN — INSULIN LISPRO 12 UNITS: 100 INJECTION, SOLUTION INTRAVENOUS; SUBCUTANEOUS at 08:20

## 2023-03-07 RX ADMIN — SODIUM CHLORIDE, PRESERVATIVE FREE 10 ML: 5 INJECTION INTRAVENOUS at 17:12

## 2023-03-07 RX ADMIN — AMLODIPINE BESYLATE 10 MG: 5 TABLET ORAL at 08:20

## 2023-03-07 RX ADMIN — INSULIN LISPRO 3 UNITS: 100 INJECTION, SOLUTION INTRAVENOUS; SUBCUTANEOUS at 17:11

## 2023-03-07 RX ADMIN — ATORVASTATIN CALCIUM 80 MG: 40 TABLET, FILM COATED ORAL at 21:49

## 2023-03-07 RX ADMIN — SODIUM CHLORIDE 75 ML/HR: 9 INJECTION, SOLUTION INTRAVENOUS at 05:56

## 2023-03-07 RX ADMIN — SODIUM CHLORIDE, PRESERVATIVE FREE 10 ML: 5 INJECTION INTRAVENOUS at 21:59

## 2023-03-07 RX ADMIN — INSULIN LISPRO 12 UNITS: 100 INJECTION, SOLUTION INTRAVENOUS; SUBCUTANEOUS at 12:31

## 2023-03-07 RX ADMIN — ENOXAPARIN SODIUM 40 MG: 100 INJECTION SUBCUTANEOUS at 08:21

## 2023-03-07 RX ADMIN — BARIUM SULFATE 30 ML: 400 SUSPENSION ORAL at 10:10

## 2023-03-07 RX ADMIN — CYANOCOBALAMIN TAB 1000 MCG 1000 MCG: 1000 TAB at 08:20

## 2023-03-07 RX ADMIN — INSULIN LISPRO 6 UNITS: 100 INJECTION, SOLUTION INTRAVENOUS; SUBCUTANEOUS at 21:49

## 2023-03-07 RX ADMIN — ASPIRIN 81 MG CHEWABLE TABLET 81 MG: 81 TABLET CHEWABLE at 08:20

## 2023-03-07 RX ADMIN — CLOPIDOGREL BISULFATE 75 MG: 75 TABLET ORAL at 08:20

## 2023-03-07 RX ADMIN — INSULIN GLARGINE 14 UNITS: 100 INJECTION, SOLUTION SUBCUTANEOUS at 21:49

## 2023-03-07 RX ADMIN — LISINOPRIL 40 MG: 40 TABLET ORAL at 08:20

## 2023-03-07 NOTE — PROGRESS NOTES
SPEECH LANGUAGE PATHOLOGY DYSPHAGIA TREATMENT  Patient: Shamir Ireland (71 y.o. female)  Date: 3/7/2023  Diagnosis: CVA (cerebral vascular accident) ([facility] [address] [I63.9]     Precautions:  Aspiration, fall    ASSESSMENT :  Patient is A&Ox2 and follows basic commands w/ min cues. Sister at bedside. Patient continues w/ R sided neglect. NGT present and tolerating TF. Patient placed in chair position. Significant oral secretions spilling anteriorly w/ intermittent throat clear/cough suspect related to secretion management. PO trials of moderately thick liquids. Oral phase c/b reduced buccal, labial and lingual strength w/ decreased labial seal, anterior and lateral spillage w/ decreased A-P transit. Fatigue does increase delay. Mild to moderate pharyngeal delay improves w/ verbal cues. Overt s/s of pen/asp c/b delayed cough x2. Suspect this is related to secretion management. Discussed w/ MD requesting medicine to reduce secretions. MBS planned for later today. Recs to follow. PLAN :  Recommendations and Planned Interventions:  Rec cont NPO pending MBS results. Rec cont dysphagia and cognitive linguistic tx. Frequency/Duration: Patient will be followed by speech-language pathology 5 times a week to address goals. Discharge Recommendations: Inpatient Rehab     SUBJECTIVE:   Sister reports patient is talking more.      OBJECTIVE:     Past Medical History:   Diagnosis Date    Diabetes ([facility] [address]     Hypercholesterolemia     Hypertension     [facility]          Current Diet:  DIET ADULT TUBE FEEDING  DIET NPO     Cognitive and Communication Status:  Neurologic State: Alert  Orientation Level: Oriented to person, Oriented to place  Cognition: Decreased command following, Decreased attention/concentration, Impaired decision making  Perception: Cues to attend right visual field, Cues to attend to right side of body  Perseveration: No perseveration noted  Safety/Judgement: Decreased awareness of environment, Decreased awareness of need for assistance, Decreased awareness of need for safety, Decreased insight into deficits            Pain:   0    After treatment:   Patient left in no apparent distress in bed, Call bell within reach, Nursing notified, Caregiver / family present, and Bed / chair alarm activated    COMMUNICATION/EDUCATION:   Patient and sister educated regarding ST POC, s/s aspiration, and aspiration precautions. Sister demonstrated Good understanding as evidenced by engagement and appropriate questions. The patient's plan of care including recommendations, planned interventions, and recommended diet changes were discussed with: Registered nurse and Physician. Problem: Dysphagia (Adult)  Goal: *Acute Goals and Plan of Care (Insert Text)  Description: Speech Therapy Swallow Goals  Initiated 3/3/2023  -Patient will tolerate full diet with moderately thick liquids without clinical indicators of aspiration given moderate cues within 7 day(s). Not Met        -Patient will tolerate PO trials without clinical indicators of aspiration given moderate cues within 7 day(s). -Patient will participate in modified barium swallow study within 7 day(s). -Patient will demonstrate understanding of swallow safety precautions and aspiration precautions, diet recs with moderate cues within 7 day(s).          -Patient will participate in speech language evaluation.     -Patient/caregiver goal: eat safely  Outcome: Progressing Towards Goal       Thank you for this referral.  TRACEY GodoyS., M.Ed., CCC-SLP  Time Calculation: 21 mins

## 2023-03-07 NOTE — PROGRESS NOTES
SPEECH PATHOLOGY MODIFIED BARIUM SWALLOW STUDY  Patient: Kateryna [place] (71 y.o. female)  Date: 3/7/2023  Primary Diagnosis: CVA (cerebral vascular accident) ([facility] [address] [I63.9]       Precautions: Aspiration, fall    ASSESSMENT :  Based on the objective data described below, the patient presents with moderate to severe oral dysphagia and mild to moderate pharyngeal dysphagia. Oral phase c/b reduced right buccal, lingual and labial strength w/ poor labial seal and significant anterior and lateral spillage. W/ thin trials 90% of bolus is spilled anteriorly. This improves w/ thicker consistencies. Reduced sensation noted. Reduced bolus formation and manipulation w/ moderate oral residue and decreased A-P transit. Premature spillage to the level of the vallecula w/ all consistencies. Overall, moderate swallow delay appreciated >3-5 seconds. This delay increases w/ continued trials s/t fatigue. Moderate reduction in BOT retraction. Mild reduction in HLE, pharyngeal constriction and epiglottic inversion. Weight of bolus improves inversion. No pen/asp observed. However, suspect patient would aspirate thin/mildly thick but patient w/ significant anterior spillage so minimal volume reaches pharynx. Mild pharyngeal residue residue. Air noted through proximal esophagus. Mild cricopharyngeal hypertrophy. Patient at high risk for aspiration. Concerns for aspiration and inability to meet nutritional intake. Monitor closely for s/s aspiration. PLAN :  Recommendations and Planned Interventions:  Rec diet initiation of full moderately thick diet w/ strict asp/GERD precautions and crushed meds. Rec slow rate of intake, small bites/sips, single bites/sips, frequent rest breaks, cues for  hard fast swallow, 6 small meals, spoon feed all PO, and remain upright 1 hour after PO intake. Rec cont dysphagia tx and cognitive linguistic tx to reduce aspiration risk.    Frequency/Duration: Patient will be followed by speech-language pathology 5 times a week to address goals. Discharge Recommendations: Inpatient Rehab     SUBJECTIVE:   Patient reports she is tired. OBJECTIVE:     Past Medical History:   Diagnosis Date    Diabetes ([facility] [address]     Hypercholesterolemia     Hypertension     Stroke [facility])      Past Surgical History:   Procedure Laterality Date    HX BACK SURGERY      HX CARPAL TUNNEL RELEASE      HX CATARACT REMOVAL Bilateral     HX ORTHOPAEDIC      ankle      Current Diet:  DIET ADULT TUBE FEEDING  DIET NPO       Video Flouroscopic Procedures  [x] Lateral View   [] A-P View [] Scanned to level of Sternum    [] Seated at 90 deg. [] Other:    Presentation:   [x] Spoon   [x] Cup   [x] Straw   [] Syringe   [] Consecutive Swallows  [] Other:    Consistencies:   [x] Ba+ liquid   [x] Ba+ liquid (nectar)   [x] Ba+ liquid (honey)     [x] Ba+ pudding   [] Ba+ crunched cookie   [] Ba+ cookie   [] Other:       Decreased Tongue Base Retraction?: Yes  Laryngeal Elevation: Reduced excursion with laryngeal vestibule gap  Aspiration/Penetration Score: 1 (No penetration or aspiration-Contrast does not enter the airway)  Pharyngeal Symmetry: Symmetrical  Pharyngeal-Esophageal Segment: Decreased relaxation of upper esophageal segment; Suspected esophageal dysphagia  Pharyngeal Dysfunction: Crico-pharyngeal dysfunction;Decreased tongue base retraction;Decreased strength;Decreased elevation/closure        COMMUNICATION/EDUCATION:   Patient receptive of education regarding MBS results, diet recs, s/s aspiration, and aspiration precautions. She demonstrated guarded understanding as evidenced by cognitive deficits. Student nurse educated on feeding strategies. The patients plan of care including findings from [facility], recommendations, planned interventions, and recommended diet changes were discussed with: Registered nurse, Physician, and student nurse .        Problem: Dysphagia (Adult)  Goal: *Acute Goals and Plan of Care (Insert Text)  Description: Speech Therapy Swallow Goals  Initiated 3/3/2023  -Patient will tolerate full diet with moderately thick liquids without clinical indicators of aspiration given moderate cues within 7 day(s). Not Met        -Patient will tolerate PO trials without clinical indicators of aspiration given moderate cues within 7 day(s). -Patient will participate in modified barium swallow study within 7 day(s). -Patient will demonstrate understanding of swallow safety precautions and aspiration precautions, diet recs with moderate cues within 7 day(s).          -Patient will participate in speech language evaluation.     -Patient/caregiver goal: eat safely  Outcome: Progressing Towards Goal     Thank you for this referral.  Jt Smith M.S., M.Ed., CCC-SLP  Time Calculation: 20 mins

## 2023-03-07 NOTE — PROGRESS NOTES
OCCUPATIONAL THERAPY TREATMENT  Patient: Peri Moya (71 y.o. female)  Date: 3/7/2023  Diagnosis: CVA (cerebral vascular accident) ([facility] [address] [I63.9] <principal problem not specified>      Precautions: In place during session: Peripheral IV  Chart, occupational therapy assessment, plan of care, and goals were reviewed. ASSESSMENT  Pt continues with skilled OT/PT services and is progressing towards goals. Pt received semi-supine in bed upon arrival, AXO x1 and agreeable to PTA/KOHLER tx at this time. Partial co-tx completed for bed mobility and sitting eob to increase safety of pt and therapist. Overall, pt continues to present with deficits in generalized strength/AROM, coordination, bed mobility, static/dynamic sitting and standing balance and functional activity tolerance during performance of ADLs/mobility (see below for objective details and assist levels). Pt tolerated session fair, required freq rest breaks. No active movements noted in R UE. Attempted hand over hand techniques to teach self rom exercises w/ poor carry over. UE therex completed to increase strength/ endurance to aid in adl performance, see below for details. Crossing midline activity attempted, pt able to cross midline 2/10 attempts w/ constant vc's. PTA joined session and Pt transitioned from supine to sitting eob w/ Min/Mod a x 2. Pt sat EOB for ~ 5 minutes, required less support than previous session. Pt completed LE therex in sitting, see PTA note for details. Pt returned to semi supine and positioned on right side. Family present at end of session. Pt pleasant and cooperative during session. Pt able to recall where she was and why for approx 15 minutes from initial orientation. Pt noted with steady gains w/ poc. Will continue to progress. Recommend d/c to [address]  once medically appropriate.      Other factors to consider for discharge: PLOF, time since onset, severity of deficits, and decline from functional baseline. PLAN :  Patient continues to benefit from skilled intervention to address the above impairments. Continue treatment per established plan of care. to address goals. Recommend with staff: Encourage HEP in prep for ADLs/mobility and Frequent repositioning to prevent skin breakdown    Recommend next session: Seated grooming    Recommendation for discharge: (in order for the patient to meet his/her long term goals)  [facility]     This discharge recommendation:  Has been made in collaboration with the attending provider and/or case management    IF patient discharges home will need the following DME: TBD       SUBJECTIVE:   Patient stated [facility] [address]  when pt saw her Mother. OBJECTIVE DATA SUMMARY:   Cognitive/Behavioral Status:  Neurologic State: Alert  Orientation Level: Oriented to person  Cognition: Follows commands    Functional Mobility and Transfers for ADLs:  Bed Mobility:  Rolling: Minimum assistance; Moderate assistance;Assist x2 (increased A to roll R)  Supine to Sit: Minimum assistance; Moderate assistance;Assist x2  Sit to Supine: Minimum assistance; Moderate assistance;Assist x2  Scooting: Moderate assistance;Assist x2    Balance:  Sitting: Impaired; With support  Sitting - Static: Fair (occasional)  Sitting - Dynamic: Poor (constant support)      Therapeutic Exercises:   Exercise Sets Reps AROM AAROM PROM Self PROM Comments   Shoulder flex/ext 1 1 [x] [] [x] [] PROM w/ R UE   Elbow flex/ext 1 1 [x] [] [x] []    Wrist flex/ext 1 1 [x] [] [x] []    Gross grasp 1 1 [x] [] [x] []        Pain:  No c/o pain.     Activity Tolerance:   Fair, Poor, and requires frequent rest breaks    After treatment patient left in no apparent distress:   Supine in bed, Patient positioned in right sidelying for pressure relief, Call bell within reach, Bed / chair alarm activated, Caregiver / family present, and Side rails x 3, bed locked and in lowest position    COMMUNICATION/COLLABORATION:   The patients plan of care was discussed with: Physical therapy assistant and Registered nurse. PT/OT sessions occurred together for increased safety of pt and clinician. TUYET Rodriguez  Time Calculation: 44 mins    Problem: Self Care Deficits Care Plan (Adult)  Goal: *Acute Goals and Plan of Care (Insert Text)  Description: FUNCTIONAL STATUS PRIOR TO ADMISSION: Per medical chart, pt was IND w/ ADLs and functional mobility. HOME SUPPORT: pt lives with mother who is 8 years old.     Occupational Therapy Goals  Initiated 3/5/2023    Pt stated goal not appropriate at this time   Pt will be mod A sup <> sit in prep for EOB ADLs  Pt will be CGA grooming sitting at EOB LRAD  Pt will be min a UB dressing sitting EOB/long sit   Pt will be max A  LE dressing sitting EOB/long sit  Pt will be min A following UE HEP in prep for self care tasks   *will add additional goals as appropriate  Outcome: Progressing Towards Goal

## 2023-03-07 NOTE — PROGRESS NOTES
Patient had honey thick full liquids for lunch and dinner and only took 2 to 3 bites and said that she was done. Nurse fed patient with HOB elevated.  Sitter at bedside

## 2023-03-07 NOTE — PROGRESS NOTES
Hospitalist Progress Note               Daily Progress Note: 3/7/2023      Subjective:   Hospital course to date:    Patient is a 77 yo female with a PMH of type 2 DM, HTN, previous smoker, and CVA that occurred in 2021 without residual symptoms who was admitted from the ED on 3/3 for slurred speech and and right-sided weakness. Exam shows right upper extremity drift and expressive aphasia. MRI of the head without contrast showed acute to subacute infarcts in the left cerebral hemisphere within the left frontoparietal deep white matter, and in a pattern suggestive of watershed infarction. Patient was admitted, kept n.p.o. initially. She was seen by speech therapy who did not feel she was safe for oral intake. NG tube was placed for medications and initiation of enteral nutrition    Discussion with son led to a DNR order  ---------------  3/6/23:  Patient is seen for follow-up. Overall, clinical edition unchanged. Still flaccid paralysis on the right. Patient still drooling out of the right side of her mouth. Discussed with speech therapy this morning, they would like to wait 1 more day before trying modified barium swallow. However, at this time it seems highly likely she will need a PEG tube    3/7/23:  Patient seen, chart was reviewed. Patient has NG tube, for MBS today. Sitter at bedside. No other acute issues reported to me by staff at this time.   No recent labs to review    Medications reviewed  Current Facility-Administered Medications   Medication Dose Route Frequency    enoxaparin (LOVENOX) injection 40 mg  40 mg SubCUTAneous Q24H    aspirin chewable tablet 81 mg  81 mg Oral DAILY    clopidogreL (PLAVIX) tablet 75 mg  75 mg Oral DAILY    cyanocobalamin tablet 1,000 mcg  1,000 mcg Oral DAILY    [Held by provider] insulin glargine (LANTUS) injection 14 Units  14 Units SubCUTAneous QHS    insulin lispro (HUMALOG) injection   SubCUTAneous AC&HS    glucose chewable tablet 16 g  4 Tablet Oral PRN    glucagon (GLUCAGEN) injection 1 mg  1 mg IntraMUSCular PRN    dextrose 10% infusion 0-250 mL  0-250 mL IntraVENous PRN    sodium chloride (NS) flush 5-40 mL  5-40 mL IntraVENous Q8H    sodium chloride (NS) flush 5-40 mL  5-40 mL IntraVENous PRN    acetaminophen (TYLENOL) tablet 650 mg  650 mg Oral Q6H PRN    Or    acetaminophen (TYLENOL) suppository 650 mg  650 mg Rectal Q6H PRN    polyethylene glycol (MIRALAX) packet 17 g  17 g Oral DAILY PRN    ondansetron (ZOFRAN ODT) tablet 4 mg  4 mg Oral Q8H PRN    Or    ondansetron (ZOFRAN) injection 4 mg  4 mg IntraVENous Q6H PRN    atorvastatin (LIPITOR) tablet 80 mg  80 mg Oral QHS    [Held by provider] alogliptin (NESINA) tablet 12.5 mg  12.5 mg Oral DAILY    And    [Held by provider] metFORMIN (GLUCOPHAGE) tablet 1,000 mg  1,000 mg Oral BID WITH MEALS    amLODIPine (NORVASC) tablet 10 mg  10 mg Oral DAILY    And    lisinopriL (PRINIVIL, ZESTRIL) tablet 40 mg  40 mg Oral DAILY    0.9% sodium chloride infusion  75 mL/hr IntraVENous CONTINUOUS       Objective:   Physical Exam:     Visit Vitals  BP (!) 163/83 (BP 1 Location: Left upper arm)   Pulse (!) 104   Temp 97.6 °F (36.4 °C)   Resp 20   Ht 5' 3\" (1.6 m)   Wt 79.4 kg (175 lb)   SpO2 96%   BMI 31.00 kg/m²      O2 Device: None (Room air)    Temp (24hrs), Av.5 °F (36.9 °C), Min:97.6 °F (36.4 °C), Max:99 °F (37.2 °C)    No intake/output data recorded.  1901 -  0700  In: 2617   Out: 400 [Urine:400]    General:  WDWN, NG tube   Lungs:   Symmetric expansion   Chest wall:  No deformity   Heart:  Tachycardic   Abdomen:   Soft, nontender, obese   Extremities: Mild edema       Skin: No visible rash   Neurologic: Right facial droop, right dense hemiplegia.          Data Review:         24 Hour Results:  Recent Results (from the past 24 hour(s))   GLUCOSE, POC    Collection Time: 23 11:50 AM   Result Value Ref Range    Glucose (POC) 321 (H) 65 - 100 mg/dL    Performed by NAJMA Perkins Collection Time: 03/06/23  4:09 PM   Result Value Ref Range    Glucose (POC) 237 (H) 65 - 100 mg/dL    Performed by James Leahc, POC    Collection Time: 03/06/23  7:00 PM   Result Value Ref Range    Glucose (POC) 224 (H) 65 - 100 mg/dL    Performed by [id number] [facility], POC    Collection Time: 03/06/23  8:29 PM   Result Value Ref Range    Glucose (POC) 253 (H) 65 - 100 mg/dL    Performed by Breann Arzola    GLUCOSE, POC    Collection Time: 03/07/23  7:21 AM   Result Value Ref Range    Glucose (POC) 343 (H) 65 - 100 mg/dL    Performed by Power Vickie        XR CHEST PORT   Final Result   Satisfactory NG tube placement. No Acute Disease. DUPLEX CAROTID BILATERAL   Final Result      MRI BRAIN WO CONT   Final Result      1. Multiple acute to subacute infarcts in the left cerebral hemisphere   predominantly within the left frontoparietal deep white matter, and in a pattern   suggestive of watershed infarction. 2. Minimal chronic microvascular ischemic disease. Chronic infarcts in the   bilateral basal ganglia, left larger than right, and left cerebellum. XR CHEST PORT   Final Result   No acute process identified. CTA CODE NEURO HEAD AND NECK W CONT   Final Result   CTA Head:   1. No evidence of flow-limiting stenosis or aneurysm. Scattered atherosclerotic   disease as above, including severe stenosis in the right P2 segment, which is   unchanged. CTA Neck:   1. No evidence of flow-limiting stenosis. 2. Evaluation of the carotid bifurcations is limited by motion, though there are   suspected moderate stenoses of the bilateral proximal internal carotid arteries,   right worse than left. CT CODE NEURO HEAD WO CONTRAST   Final Result   Chronic ischemic changes. No acute findings.             XR SWALLOW FUNC VIDEO    (Results Pending)        Assessment with MDM/DDx:  Acute CVA with right hemiplegia and dysphagia  Hypertension-BP variable  DMT2-uncontrolled with hyperglycemia  Previous CVA-without any residual symptoms  Moderate right carotid and mild left carotid stenosis  Mild hyponatremia-        Plan-  On aspirin/Plavix and statins  On NG tube feedings  MBS today per SLP  May need PEG tube  Continue other medications as above  Monitor labs periodically  Monitor BP adjust accordingly  Monitor clinical course and response to treatment above  Resume Lantus    Advance directive-DNR per chart  DVT prophylaxis-subcu Lovenox  Next of kin-she could not specify  Dispo-rehab facility once feeding tube options confirmed              Ethan Peterson MD

## 2023-03-07 NOTE — PROGRESS NOTES
Problem: Mobility Impaired (Adult and Pediatric)  Goal: *Acute Goals and Plan of Care (Insert Text)  Description: FUNCTIONAL STATUS PRIOR TO ADMISSION: Patient was modified independent using a single point cane for functional mobility. HOME SUPPORT PRIOR TO ADMISSION: The patient lived with 8 years old mother but did not require assist.    Patient stated goal: none verbalized  Patient will move from supine to sit and sit to supine , scoot up and down, and roll side to side in bed with moderate assistance  within 7 day(s). Patient will transfer from bed to chair and chair to bed with moderate assistance  using the least restrictive device within 7 day(s). Patient will improve static sitting balance to minimal assistance within 1 week(s). Patient will ambulate 10 feet with moderate assistance with least restrictive device within 1 weeks. Outcome: Progressing Towards Goal   PHYSICAL THERAPY TREATMENT  Patient: Marito Mascorro (71 y.o. female)  Date: 3/7/2023  Diagnosis: CVA (cerebral vascular accident) ([facility] [address] [I63.9] <principal problem not specified>      Precautions: In place during session: Peripheral IV and EKG/telemetry   Chart, physical therapy assessment, plan of care and goals were reviewed. ASSESSMENT  Patient continues with skilled PT services and is progressing towards goals. Pt found supine in bed upon PTA arrival, agreeable to session. (See below for objective details and assist levels). Overall pt tolerated session fair today with bed mobility and sitting EOB, limited by activity tolerance and endurance. Pt continues to require increased A for bed mobility, demo's improved sitting balance and RLE ROM sitting EOB. Pt able to maintain balance sitting EOB with mostly CGA this session and intermittent min A, improved righting reactions noted with LOB.  Demo's good rehab potential to improve functional mobility, transfers and activity tolerance to promote functional independence andr return to PLOF. Will continue to benefit from skilled PT services, and will continue to progress as tolerated. Current Level of Function Impacting Discharge (mobility/balance): functional mobility     Other factors to consider for discharge: PLOF          PLAN :  Patient continues to benefit from skilled intervention to address the above impairments. Continue treatment per established plan of care to address goals. Recommend with staff: Encourage HEP in prep for ADLs/mobility and Frequent repositioning to prevent skin breakdown    Recommendation for discharge: (in order for the patient to meet his/her long term goals)  [address] [facility]     This discharge recommendation:  Has been made in collaboration with the attending provider and/or case management    IF patient discharges home will need the following DME: to be determined (TBD)       SUBJECTIVE:   Patient stated Oh my God.     OBJECTIVE DATA SUMMARY:   Critical Behavior:  Neurologic State: Alert  Orientation Level: Oriented to person  Cognition: Follows commands  Safety/Judgement: Decreased awareness of environment, Decreased awareness of need for assistance, Decreased awareness of need for safety, Decreased insight into deficits  Functional Mobility Training:  Bed Mobility:  Rolling: Minimum assistance; Moderate assistance;Assist x2 (increased A to roll R)  Supine to Sit: Minimum assistance; Moderate assistance;Assist x2  Sit to Supine: Minimum assistance; Moderate assistance;Assist x2  Scooting: Moderate assistance;Assist x2  Balance:  Sitting: Impaired; With support  Sitting - Static: Fair (occasional)  Sitting - Dynamic: Poor (constant support)  Ambulation/Gait Training:      Therapeutic Exercises:       EXERCISE   Sets   Reps   Active Active Assist   Passive Self ROM   Comments   Ankle Pumps   [] [] [] []    Quad Sets/Glut Sets   [] [] [] []    Hamstring Sets   [] [] [] []    Short Arc Quads   [] [] [] []    Heel Slides   [] [] [] [] Straight Leg Raises   [] [] [] []    Hip abd/add   [] [] [] []    Long Arc Quads 1 10 [x] [] [] []    Marching 1 10 [x] [] [] [] RLE      [] [] [] []       Pain Rating:  No reports of pain     Activity Tolerance:   Fair and requires rest breaks    After treatment patient left in no apparent distress:   Bed locked and returned to lowest position, Patient positioned in R sidelying for pressure relief, Call bell within reach, Bed / chair alarm activated, Caregiver / family present, and Side rails x 3    COMMUNICATION/COLLABORATION:   The patients plan of care was discussed with: Occupational therapy assistant and Registered nurse. PT/OT sessions occurred together for increased safety of pt and clinician.      Roman Lambert PTA, PT   Time Calculation: 32 mins

## 2023-03-07 NOTE — PROGRESS NOTES
DC Plan: [facility]   Auth ref# [id number]    ___________________________________________________    [facility] received authorization.

## 2023-03-08 LAB
ALBUMIN SERPL-MCNC: 2.7 G/DL (ref 3.5–5)
ANION GAP SERPL CALC-SCNC: 5 MMOL/L (ref 5–15)
BASOPHILS # BLD: 0 K/UL (ref 0–0.1)
BASOPHILS NFR BLD: 0 % (ref 0–1)
BUN SERPL-MCNC: 33 MG/DL (ref 6–20)
BUN/CREAT SERPL: 35 (ref 12–20)
CA-I BLD-MCNC: 8.8 MG/DL (ref 8.5–10.1)
CHLORIDE SERPL-SCNC: 102 MMOL/L (ref 97–108)
CO2 SERPL-SCNC: 25 MMOL/L (ref 21–32)
CREAT SERPL-MCNC: 0.94 MG/DL (ref 0.55–1.02)
DIFFERENTIAL METHOD BLD: ABNORMAL
EOSINOPHIL # BLD: 0.1 K/UL (ref 0–0.4)
EOSINOPHIL NFR BLD: 1 % (ref 0–7)
ERYTHROCYTE [DISTWIDTH] IN BLOOD BY AUTOMATED COUNT: 13.1 % (ref 11.5–14.5)
GLUCOSE BLD STRIP.AUTO-MCNC: 171 MG/DL (ref 65–100)
GLUCOSE BLD STRIP.AUTO-MCNC: 209 MG/DL (ref 65–100)
GLUCOSE BLD STRIP.AUTO-MCNC: 298 MG/DL (ref 65–100)
GLUCOSE BLD STRIP.AUTO-MCNC: 303 MG/DL (ref 65–100)
GLUCOSE SERPL-MCNC: 312 MG/DL (ref 65–100)
HCT VFR BLD AUTO: 33.6 % (ref 35–47)
HGB BLD-MCNC: 11.4 G/DL (ref 11.5–16)
IMM GRANULOCYTES # BLD AUTO: 0 K/UL (ref 0–0.04)
IMM GRANULOCYTES NFR BLD AUTO: 0 % (ref 0–0.5)
LYMPHOCYTES # BLD: 0.6 K/UL (ref 0.8–3.5)
LYMPHOCYTES NFR BLD: 10 % (ref 12–49)
MAGNESIUM SERPL-MCNC: 2.1 MG/DL (ref 1.6–2.4)
MCH RBC QN AUTO: 32.8 PG (ref 26–34)
MCHC RBC AUTO-ENTMCNC: 33.9 G/DL (ref 30–36.5)
MCV RBC AUTO: 96.6 FL (ref 80–99)
MONOCYTES # BLD: 0.7 K/UL (ref 0–1)
MONOCYTES NFR BLD: 13 % (ref 5–13)
NEUTS SEG # BLD: 4.4 K/UL (ref 1.8–8)
NEUTS SEG NFR BLD: 76 % (ref 32–75)
NRBC # BLD: 0 K/UL (ref 0–0.01)
NRBC BLD-RTO: 0 PER 100 WBC
PERFORMED BY, TECHID: ABNORMAL
PHOSPHATE SERPL-MCNC: 3.5 MG/DL (ref 2.6–4.7)
PLATELET # BLD AUTO: 292 K/UL (ref 150–400)
PMV BLD AUTO: 9.5 FL (ref 8.9–12.9)
POTASSIUM SERPL-SCNC: 4 MMOL/L (ref 3.5–5.1)
RBC # BLD AUTO: 3.48 M/UL (ref 3.8–5.2)
SODIUM SERPL-SCNC: 132 MMOL/L (ref 136–145)
WBC # BLD AUTO: 5.7 K/UL (ref 3.6–11)

## 2023-03-08 PROCEDURE — 83735 ASSAY OF MAGNESIUM: CPT

## 2023-03-08 PROCEDURE — 65270000029 HC RM PRIVATE

## 2023-03-08 PROCEDURE — 74011250636 HC RX REV CODE- 250/636: Performed by: INTERNAL MEDICINE

## 2023-03-08 PROCEDURE — 92507 TX SP LANG VOICE COMM INDIV: CPT

## 2023-03-08 PROCEDURE — 97530 THERAPEUTIC ACTIVITIES: CPT

## 2023-03-08 PROCEDURE — 80069 RENAL FUNCTION PANEL: CPT

## 2023-03-08 PROCEDURE — 92526 ORAL FUNCTION THERAPY: CPT

## 2023-03-08 PROCEDURE — 74011000250 HC RX REV CODE- 250: Performed by: NURSE PRACTITIONER

## 2023-03-08 PROCEDURE — 82962 GLUCOSE BLOOD TEST: CPT

## 2023-03-08 PROCEDURE — 85025 COMPLETE CBC W/AUTO DIFF WBC: CPT

## 2023-03-08 PROCEDURE — 36415 COLL VENOUS BLD VENIPUNCTURE: CPT

## 2023-03-08 PROCEDURE — 74011250637 HC RX REV CODE- 250/637: Performed by: NURSE PRACTITIONER

## 2023-03-08 PROCEDURE — 74011636637 HC RX REV CODE- 636/637: Performed by: NURSE PRACTITIONER

## 2023-03-08 RX ADMIN — CYANOCOBALAMIN TAB 1000 MCG 1000 MCG: 1000 TAB at 09:10

## 2023-03-08 RX ADMIN — CLOPIDOGREL BISULFATE 75 MG: 75 TABLET ORAL at 09:10

## 2023-03-08 RX ADMIN — INSULIN LISPRO 12 UNITS: 100 INJECTION, SOLUTION INTRAVENOUS; SUBCUTANEOUS at 09:07

## 2023-03-08 RX ADMIN — LISINOPRIL 40 MG: 40 TABLET ORAL at 09:10

## 2023-03-08 RX ADMIN — INSULIN LISPRO 3 UNITS: 100 INJECTION, SOLUTION INTRAVENOUS; SUBCUTANEOUS at 17:18

## 2023-03-08 RX ADMIN — SODIUM CHLORIDE, PRESERVATIVE FREE 10 ML: 5 INJECTION INTRAVENOUS at 13:10

## 2023-03-08 RX ADMIN — INSULIN LISPRO 6 UNITS: 100 INJECTION, SOLUTION INTRAVENOUS; SUBCUTANEOUS at 21:35

## 2023-03-08 RX ADMIN — SODIUM CHLORIDE, PRESERVATIVE FREE 10 ML: 5 INJECTION INTRAVENOUS at 21:35

## 2023-03-08 RX ADMIN — AMLODIPINE BESYLATE 10 MG: 5 TABLET ORAL at 09:10

## 2023-03-08 RX ADMIN — ATORVASTATIN CALCIUM 80 MG: 40 TABLET, FILM COATED ORAL at 21:35

## 2023-03-08 RX ADMIN — SODIUM CHLORIDE, PRESERVATIVE FREE 10 ML: 5 INJECTION INTRAVENOUS at 05:10

## 2023-03-08 RX ADMIN — ASPIRIN 81 MG CHEWABLE TABLET 81 MG: 81 TABLET CHEWABLE at 09:11

## 2023-03-08 RX ADMIN — ENOXAPARIN SODIUM 40 MG: 100 INJECTION SUBCUTANEOUS at 09:10

## 2023-03-08 RX ADMIN — INSULIN GLARGINE 14 UNITS: 100 INJECTION, SOLUTION SUBCUTANEOUS at 21:35

## 2023-03-08 RX ADMIN — INSULIN LISPRO 9 UNITS: 100 INJECTION, SOLUTION INTRAVENOUS; SUBCUTANEOUS at 12:14

## 2023-03-08 NOTE — PROGRESS NOTES
Hospitalist Progress Note               Daily Progress Note: 3/8/2023      Subjective:   Hospital course to date:    Patient is a 77 yo female with a PMH of type 2 DM, HTN, previous smoker, and CVA that occurred in 2021 without residual symptoms who was admitted from the ED on 3/3 for slurred speech and and right-sided weakness. Exam shows right upper extremity drift and expressive aphasia. MRI of the head without contrast showed acute to subacute infarcts in the left cerebral hemisphere within the left frontoparietal deep white matter, and in a pattern suggestive of watershed infarction. Patient was admitted, kept n.p.o. initially. She was seen by speech therapy who did not feel she was safe for oral intake. NG tube was placed for medications and initiation of enteral nutrition    Discussion with son led to a DNR order  ---------------  3/6/23:  Patient is seen for follow-up. Overall, clinical edition unchanged. Still flaccid paralysis on the right. Patient still drooling out of the right side of her mouth. Discussed with speech therapy this morning, they would like to wait 1 more day before trying modified barium swallow. However, at this time it seems highly likely she will need a PEG tube    3/7/23:  Patient seen, chart was reviewed. Patient has NG tube, for MBS today. Sitter at bedside. No other acute issues reported to me by staff at this time. No recent labs to review    3/8/23:  Patient seen and examined, chart was reviewed. NG tube was removed, MBS done by SLP yesterday. SLP started diet but patient has decreased p.o. intake per staff. No other acute issues reported to me by staff at this time.     Medications reviewed  Current Facility-Administered Medications   Medication Dose Route Frequency    hydrALAZINE (APRESOLINE) 20 mg/mL injection 20 mg  20 mg IntraVENous Q6H PRN    enoxaparin (LOVENOX) injection 40 mg  40 mg SubCUTAneous Q24H    aspirin chewable tablet 81 mg  81 mg Oral DAILY    clopidogreL (PLAVIX) tablet 75 mg  75 mg Oral DAILY    cyanocobalamin tablet 1,000 mcg  1,000 mcg Oral DAILY    insulin glargine (LANTUS) injection 14 Units  14 Units SubCUTAneous QHS    insulin lispro (HUMALOG) injection   SubCUTAneous AC&HS    glucose chewable tablet 16 g  4 Tablet Oral PRN    glucagon (GLUCAGEN) injection 1 mg  1 mg IntraMUSCular PRN    dextrose 10% infusion 0-250 mL  0-250 mL IntraVENous PRN    sodium chloride (NS) flush 5-40 mL  5-40 mL IntraVENous Q8H    sodium chloride (NS) flush 5-40 mL  5-40 mL IntraVENous PRN    acetaminophen (TYLENOL) tablet 650 mg  650 mg Oral Q6H PRN    Or    acetaminophen (TYLENOL) suppository 650 mg  650 mg Rectal Q6H PRN    polyethylene glycol (MIRALAX) packet 17 g  17 g Oral DAILY PRN    ondansetron (ZOFRAN ODT) tablet 4 mg  4 mg Oral Q8H PRN    Or    ondansetron (ZOFRAN) injection 4 mg  4 mg IntraVENous Q6H PRN    atorvastatin (LIPITOR) tablet 80 mg  80 mg Oral QHS    [Held by provider] alogliptin (NESINA) tablet 12.5 mg  12.5 mg Oral DAILY    And    [Held by provider] metFORMIN (GLUCOPHAGE) tablet 1,000 mg  1,000 mg Oral BID WITH MEALS    amLODIPine (NORVASC) tablet 10 mg  10 mg Oral DAILY    And    lisinopriL (PRINIVIL, ZESTRIL) tablet 40 mg  40 mg Oral DAILY       Objective:   Physical Exam:     Visit Vitals  /73   Pulse 99   Temp 98.5 °F (36.9 °C)   Resp 18   Ht 5' 3\" (1.6 m)   Wt 79.4 kg (175 lb)   SpO2 95%   BMI 31.00 kg/m²      O2 Device: None (Room air)    Temp (24hrs), Av.3 °F (36.8 °C), Min:98 °F (36.7 °C), Max:98.5 °F (36.9 °C)    701 - 0  In: -   Out: 650 [Urine:650]   1901 -  0700  In: 25 [P.O.:25]  Out: 1300 [Urine:1300]    General:  WDWN    Lungs:   Symmetric expansion   Chest wall:  No deformity   Heart:  RRR   Abdomen:   Soft, nontender, obese   Extremities: Mild edema       Skin: No visible rash   Neurologic: Right facial droop, right dense hemiplegia.          Data Review:         24 Hour Results:  Recent Results (from the past 24 hour(s))   GLUCOSE, POC    Collection Time: 03/07/23 11:40 AM   Result Value Ref Range    Glucose (POC) 345 (H) 65 - 100 mg/dL    Performed by [id number], POC    Collection Time: 03/07/23 11:43 AM   Result Value Ref Range    Glucose (POC) 322 (H) 65 - 100 mg/dL    Performed by [id number], POC    Collection Time: 03/07/23  4:26 PM   Result Value Ref Range    Glucose (POC) 188 (H) 65 - 100 mg/dL    Performed by Bennett Mckeon    GLUCOSE, POC    Collection Time: 03/07/23  7:08 PM   Result Value Ref Range    Glucose (POC) 229 (H) 65 - 100 mg/dL    Performed by Vero Haddad, POC    Collection Time: 03/08/23  7:10 AM   Result Value Ref Range    Glucose (POC) 303 (H) 65 - 100 mg/dL    Performed by Mandi Killian, POC    Collection Time: 03/08/23 11:09 AM   Result Value Ref Range    Glucose (POC) 298 (H) 65 - 100 mg/dL    Performed by [address] [place] [address]   Final Result   There was no penetration or aspiration. There was delay in   initiation of swallowing mechanism. There is also fair amount of perioral   spillage externally. Please see speech therapy documentation in the chart for further details. Fluoroscopy time: 1 minute 31 seconds   Air kerma (radiation dose): 65.8 mGy         XR CHEST PORT   Final Result   Satisfactory NG tube placement. No Acute Disease. DUPLEX CAROTID BILATERAL   Final Result      MRI BRAIN WO CONT   Final Result      1. Multiple acute to subacute infarcts in the left cerebral hemisphere   predominantly within the left frontoparietal deep white matter, and in a pattern   suggestive of watershed infarction. 2. Minimal chronic microvascular ischemic disease. Chronic infarcts in the   bilateral basal ganglia, left larger than right, and left cerebellum. XR CHEST PORT   Final Result   No acute process identified.            CTA CODE NEURO HEAD AND NECK W CONT   Final Result   CTA Head:   1. No evidence of flow-limiting stenosis or aneurysm. Scattered atherosclerotic   disease as above, including severe stenosis in the right P2 segment, which is   unchanged. CTA Neck:   1. No evidence of flow-limiting stenosis. 2. Evaluation of the carotid bifurcations is limited by motion, though there are   suspected moderate stenoses of the bilateral proximal internal carotid arteries,   right worse than left. CT CODE NEURO HEAD WO CONTRAST   Final Result   Chronic ischemic changes. No acute findings.                  Assessment with MDM/DDx:  Acute CVA with right hemiplegia and dysphagia  Hypertension-BP variable  DMT2-uncontrolled with hyperglycemia  Previous CVA-without any residual symptoms  Moderate right carotid and mild left carotid stenosis  Mild hyponatremia-        Plan-  On aspirin/Plavix and statins  S/p NGT  Diet as per SLP  May need PEG tube  Continue other medications as above  Monitor labs  today  Monitor BP adjust accordingly  Monitor clinical course    Cont Lantus    Advance directive-DNR per chart  DVT prophylaxis-subcu Lovenox  Next of kin-she could not specify  Dispo-rehab facility if able to tolerated PO diet            Ethan Padilla MD

## 2023-03-08 NOTE — PROGRESS NOTES
SPEECH LANGUAGE PATHOLOGY DYSPHAGIA AND SPEECH TREATMENT  Patient: Yani Record (78 y.o. female)  Date: 3/8/2023  Diagnosis: CVA (cerebral vascular accident) ([facility] [address] [I63.9]     Precautions:  Aspiration, fall    ASSESSMENT :  ST tx: Automatic tasks: 100%, object namin% independently w/ increase to 95% w/ mod cues, body ID: 80% independently w/ increase to 100% w/ mod cues, opposites: 75%, sentence completion 85%, simple yes/no: 90%, 1 step commands: 55% w/ increase to 90% w/ mod cues, categories: 20%. She continues w/ R sided neglect improves w/ verbal cues to attend to R visual field. Attention deficits improving benefits from reduced distractions and re-engagement to task. Patient benefits from visual, phonemic and semantic cues. Paraphasias noted throughout speech. Some perseveration noted patient aware and exhibits frustration. Patient is highly motivated and continues to improve. SW tx: Introduced oral motor exercises patient demonstrated difficulty completing tasks w/ groping observed concerns for apraxia. Improvement noted w/ mirror use and visual cues. PO trials w/ ice chips, mildly thick and moderately thick. Oropharyngeal swallow fxn consistent w/ MBS. There is mild improvement in secretion management and bolus control. Overt s/s of pen/asp observed w/ ice chips c/b strong cough and reports of choking and mildly thick x2 c/b wet vocal quality. PLAN :  Recommendations and Planned Interventions:  Rec diet upgrade to moderately thick diet w/ strict asp/GERD precautions and crushed meds. Rec slow rate of intake, small bites/sips, single bites/sips, frequent rest breaks, cues for  hard fast swallow, 6 small meals, spoon feed all PO, and remain upright 1 hour after PO intake. Rec cont dysphagia tx and cognitive linguistic tx to reduce aspiration risk. Frequency/Duration: Patient will be followed by speech-language pathology 5 times a week to address goals.   Discharge Recommendations: Inpatient Rehab     SUBJECTIVE:   Patient reports she will work hard. OBJECTIVE:     Past Medical History:   Diagnosis Date    Diabetes ([facility] [address]     Hypercholesterolemia     Hypertension     Stroke [facility])        Current Diet:  DIET ADULT TUBE FEEDING  DIET ADULT     Cognitive and Communication Status:  Neurologic State: Alert  Orientation Level: Oriented to person, Oriented to place  Cognition: Decreased command following, Decreased attention/concentration, Impaired decision making  Perception: Cues to attend right visual field, Cues to attend to right side of body  Perseveration: No perseveration noted  Safety/Judgement: Decreased awareness of environment, Decreased awareness of need for assistance, Decreased awareness of need for safety, Decreased insight into deficits        Pain:  Pain Scale 1: Numeric (0 - 10)  Pain Intensity 1: 0       After treatment:   Patient left in no apparent distress in bed, Call bell within reach, Nursing notified, Caregiver / family present, and Bed / chair alarm activated    COMMUNICATION/EDUCATION:   Patient and friends educated regarding ST POC, speech strategies, swallow strategies, s/s aspiration, and aspiration precautions. Friends demonstrated Good understanding as evidenced by engagement and appropriate questions. The patient's plan of care including recommendations, planned interventions, and recommended diet changes were discussed with: Registered nurse and Physician.         Thank you for this referral.  Brigette Martinez M.S., M.Ed., CCC-SLP  Time Calculation: 44 mins

## 2023-03-08 NOTE — PROGRESS NOTES
DC Plan: [facility] was approved for inpatient rehab. Cm will continue to follow. ___________________________________________________    Cm was informed pt's son wanted to speak with CM. Cm stopped by pt's room. Son was not present. 1314    Cm attempted to contact pt's son Nerissa Fess [phone]. Cm left a voice message.

## 2023-03-08 NOTE — PROGRESS NOTES
OCCUPATIONAL THERAPY TREATMENT  Patient: Angelica Farias (71 y.o. female)  Date: 3/8/2023  Diagnosis: CVA (cerebral vascular accident) ([facility] [address] [I63.9] <principal problem not specified>      Precautions: In place during session: Peripheral IV and EKG/telemetry   Chart, occupational therapy assessment, plan of care, and goals were reviewed. ASSESSMENT  Pt continues with skilled OT/PT services and is progressing towards goals. Pt received semi-supine in bed upon arrival, AXO x1 and agreeable to PTA/KOHLER tx at this time. Discussed goals w/ supervising OT and standing goal added. Overall, pt continues to present with deficits in generalized strength/AROM, coordination, bed mobility, static/dynamic sitting and standing balance and functional activity tolerance during performance of ADLs/mobility (see below for objective details and assist levels). Pt tolerated session well. Improvements noted in all areas of poc. Pt tolerated sitting eob for ~ 10 minutes, able to hold self upright for 1-2 seconds w/o assistance. Pt completed crossing midline activity, able to scan to right and touch therapist hand 10/10 trails w/ mod vc's and time for processing of commands. Pt completed 2 sts trails w/ Max A x 2 w/ gait belt and hand held assist. Pt completed UE therex semi supine in bed, see grid below for details. R UE, no muscle contractions noted. Tone slightly increased and no subluxation noted. Decrease secretions from mouth noted. Pt's speech more clear. Will continue to progress. Recommend d/c to [address]  once medically appropriate. Other factors to consider for discharge: PLOF, time since onset, severity of deficits, and decline from functional baseline. PLAN :  Patient continues to benefit from skilled intervention to address the above impairments. Continue treatment per established plan of care. to address goals.     Recommend with staff: Encourage HEP in prep for ADLs/mobility and Frequent repositioning to prevent skin breakdown    Recommend next session: Seated grooming    Recommendation for discharge: (in order for the patient to meet his/her long term goals)  Inpatient Rehabilitation Facility     This discharge recommendation:  Has been made in collaboration with the attending provider and/or case management    IF patient discharges home will need the following DME: TBD       SUBJECTIVE:   Patient stated My mama came to see me.     OBJECTIVE DATA SUMMARY:   Cognitive/Behavioral Status:  Neurologic State: Alert  Orientation Level: Oriented to person  Cognition: Follows commands    Functional Mobility and Transfers for ADLs:  Bed Mobility:  Rolling: Minimum assistance; Moderate assistance;Assist x2  Supine to Sit: Minimum assistance;Assist x2  Sit to Supine: Moderate assistance;Assist x2  Scooting: Moderate assistance;Assist x2    Transfers:  Sit to Stand: Maximum assistance;Assist x2    Balance:  Sitting: Impaired; With support  Sitting - Static: Fair (occasional)  Sitting - Dynamic: Fair (occasional); Poor (constant support)  Standing: Impaired;Pull to stand; With support  Standing - Static: Constant support;Poor      Therapeutic Exercises:   Exercise Sets Reps AROM LUE AAROM PROM RUE Self PROM Comments   Shoulder flex/ext 1 10 [x]  [] [x] []    Elbow flex/ext 1 10 [x] [] [x] []    Wrist flex/ext 1 10 [x] [] [x] []    Gross grasp 1 10 [x] [] [x] []        Pain:  0/10     Activity Tolerance:   Fair and requires frequent rest breaks    After treatment patient left in no apparent distress:   Supine in bed, Call bell within reach, Bed / chair alarm activated, Caregiver / family present, and Side rails x 3, bed locked and in lowest position    COMMUNICATION/COLLABORATION:   The patients plan of care was discussed with: Physical therapy assistant and Registered nurse. PT/OT sessions occurred together for increased safety of pt and clinician.      TUYET Roa  Time Calculation: 38 mins Problem: Self Care Deficits Care Plan (Adult)  Goal: *Acute Goals and Plan of Care (Insert Text)  Description: FUNCTIONAL STATUS PRIOR TO ADMISSION: Per medical chart, pt was IND w/ ADLs and functional mobility. HOME SUPPORT: pt lives with mother who is 8 years old. Occupational Therapy Goals  Initiated 3/5/2023    Pt stated goal not appropriate at this time   Pt will be mod A sup <> sit in prep for EOB ADLs  Pt will be CGA grooming sitting at EOB LRAD  Pt will be min a UB dressing sitting EOB/long sit   Pt will be max A  LE dressing sitting EOB/long sit  Pt will be min A following UE HEP in prep for self care tasks  Pt will be mod A for sit to  prep for toileting.     Outcome: Progressing Towards Goal

## 2023-03-08 NOTE — PROGRESS NOTES
Problem: Pressure Injury - Risk of  Goal: *Prevention of pressure injury  Description: Document Nitin Scale and appropriate interventions in the flowsheet. Outcome: Progressing Towards Goal  Note: Pressure Injury Interventions:  Sensory Interventions: Turn and reposition approx. every two hours (pillows and wedges if needed), Minimize linen layers, Keep linens dry and wrinkle-free, Float heels, Assess changes in LOC    Moisture Interventions: Absorbent underpads, Apply protective barrier, creams and emollients, Internal/External urinary devices, Minimize layers    Activity Interventions: Pressure redistribution bed/mattress(bed type)    Mobility Interventions: HOB 30 degrees or less, Float heels, Turn and reposition approx. every two hours(pillow and wedges)    Nutrition Interventions: Document food/fluid/supplement intake, Discuss nutritional consult with provider    Friction and Shear Interventions: HOB 30 degrees or less, Foam dressings/transparent film/skin sealants, Lift sheet, Minimize layers, Apply protective barrier, creams and emollients, Feet elevated on foot rest                Problem: Patient Education: Go to Patient Education Activity  Goal: Patient/Family Education  Outcome: Progressing Towards Goal     Problem: Falls - Risk of  Goal: *Absence of Falls  Description: Document Jennifer Fall Risk and appropriate interventions in the flowsheet.   Outcome: Progressing Towards Goal  Note: Fall Risk Interventions:       Mentation Interventions: Bed/chair exit alarm, Increase mobility         Elimination Interventions: Bed/chair exit alarm              Problem: Patient Education: Go to Patient Education Activity  Goal: Patient/Family Education  Outcome: Progressing Towards Goal     Problem: Patient Education: Go to Patient Education Activity  Goal: Patient/Family Education  Outcome: Progressing Towards Goal     Problem: TIA/CVA Stroke: 0-24 hours  Goal: Off Pathway (Use only if patient is Off Pathway)  Outcome: Progressing Towards Goal  Goal: Activity/Safety  Outcome: Progressing Towards Goal  Goal: Consults, if ordered  Outcome: Progressing Towards Goal  Goal: Diagnostic Test/Procedures  Outcome: Progressing Towards Goal  Goal: Nutrition/Diet  Outcome: Progressing Towards Goal  Goal: Discharge Planning  Outcome: Progressing Towards Goal  Goal: Medications  Outcome: Progressing Towards Goal  Goal: Respiratory  Outcome: Progressing Towards Goal  Goal: Treatments/Interventions/Procedures  Outcome: Progressing Towards Goal  Goal: Minimize risk of bleeding post-thrombolytic infusion  Outcome: Progressing Towards Goal  Goal: Monitor for complications post-thrombolytic infusion  Outcome: Progressing Towards Goal  Goal: Psychosocial  Outcome: Progressing Towards Goal  Goal: *Hemodynamically stable  Outcome: Progressing Towards Goal  Goal: *Neurologically stable  Description: Absence of additional neurological deficits    Outcome: Progressing Towards Goal  Goal: *Verbalizes anxiety and depression are reduced or absent  Outcome: Progressing Towards Goal  Goal: *Absence of Signs of Aspiration on Current Diet  Outcome: Progressing Towards Goal  Goal: *Absence of deep venous thrombosis signs and symptoms(Stroke Metric)  Outcome: Progressing Towards Goal  Goal: *Ability to perform ADLs and demonstrates progressive mobility and function  Outcome: Progressing Towards Goal  Goal: *Stroke education started(Stroke Metric)  Outcome: Progressing Towards Goal  Goal: *Dysphagia screen performed(Stroke Metric)  Outcome: Progressing Towards Goal  Goal: *Rehab consulted(Stroke Metric)  Outcome: Progressing Towards Goal     Problem: Patient Education: Go to Patient Education Activity  Goal: Patient/Family Education  Description: Patient to participate in automatic speech tasks: count 1-10 with 60% accuracy  Patient to name/label environmental items with 60% accuracy. Patient to ID from a F:2 with 60% accuracy.    Patient to follow 1 step commands with 60% accuracy. Patient orientation x4 with external aids. Outcome: Progressing Towards Goal     Problem: Patient Education: Go to Patient Education Activity  Goal: Patient/Family Education  Outcome: Progressing Towards Goal     Problem: Delirium Treatment  Goal: *Level of consciousness restored to baseline  Outcome: Progressing Towards Goal  Goal: *Level of environmental perceptions restored to baseline  Outcome: Progressing Towards Goal  Goal: *Sensory perception restored to baseline  Outcome: Progressing Towards Goal  Goal: *Emotional stability restored to baseline  Outcome: Progressing Towards Goal  Goal: *Functional assessment restored to baseline  Outcome: Progressing Towards Goal  Goal: *Absence of falls  Outcome: Progressing Towards Goal  Goal: *Will remain free of delirium, CAM Score negative  Outcome: Progressing Towards Goal  Goal: *Cognitive status will be restored to baseline  Outcome: Progressing Towards Goal  Goal: Interventions  Outcome: Progressing Towards Goal     Problem: Patient Education: Go to Patient Education Activity  Goal: Patient/Family Education  Outcome: Progressing Towards Goal     Problem: Patient Education: Go to Patient Education Activity  Goal: Patient/Family Education  Outcome: Progressing Towards Goal     Problem: Diabetes Self-Management  Goal: *Disease process and treatment process  Description: Define diabetes and identify own type of diabetes; list 3 options for treating diabetes. Outcome: Progressing Towards Goal  Goal: *Incorporating nutritional management into lifestyle  Description: Describe effect of type, amount and timing of food on blood glucose; list 3 methods for planning meals. Outcome: Progressing Towards Goal  Goal: *Incorporating physical activity into lifestyle  Description: State effect of exercise on blood glucose levels.   Outcome: Progressing Towards Goal  Goal: *Developing strategies to promote health/change behavior  Description: Define the ABC's of diabetes; identify appropriate screenings, schedule and personal plan for screenings. Outcome: Progressing Towards Goal  Goal: *Using medications safely  Description: State effect of diabetes medications on diabetes; name diabetes medication taking, action and side effects. Outcome: Progressing Towards Goal  Goal: *Monitoring blood glucose, interpreting and using results  Description: Identify recommended blood glucose targets  and personal targets. Outcome: Progressing Towards Goal  Goal: *Prevention, detection, treatment of acute complications  Description: List symptoms of hyper- and hypoglycemia; describe how to treat low blood sugar and actions for lowering  high blood glucose level. Outcome: Progressing Towards Goal  Goal: *Prevention, detection and treatment of chronic complications  Description: Define the natural course of diabetes and describe the relationship of blood glucose levels to long term complications of diabetes.   Outcome: Progressing Towards Goal  Goal: *Developing strategies to address psychosocial issues  Description: Describe feelings about living with diabetes; identify support needed and support network  Outcome: Progressing Towards Goal  Goal: *Insulin pump training  Outcome: Progressing Towards Goal  Goal: *Sick day guidelines  Outcome: Progressing Towards Goal  Goal: *Patient Specific Goal (EDIT GOAL, INSERT TEXT)  Outcome: Progressing Towards Goal     Problem: Patient Education: Go to Patient Education Activity  Goal: Patient/Family Education  Outcome: Progressing Towards Goal     Problem: Patient Education: Go to Patient Education Activity  Goal: Patient/Family Education  Outcome: Progressing Towards Goal

## 2023-03-08 NOTE — PROGRESS NOTES
PHYSICAL THERAPY TREATMENT  Patient: Lupe Avila (71 y.o. female)  Date: 3/8/2023  Diagnosis: [facility] [I63.9] <principal problem not specified>      Precautions:    Chart, physical therapy assessment, plan of care and goals were reviewed. ASSESSMENT  Patient continues with skilled PT services and is progressing towards goals. Pt seated in bed upon PT arrival, agreeable to session. Pt performed LE therex well with slight assist needed on RLE at times due to fatigue with repetitions. Pt completed bed mobility min-mod A x2 overall and able to stand max A x2 for two transfers. During stand pt cleared bed but demos flexed posture at the hips req encouragement to achieve upright postioning. Pt sat EOB approx 10 min and worked on weight shifting left/right to place weight through RUE. Pt also req more assist for seated balance when completing functional reaching tasks. Pt leans to the right side with encouragement provided to shift weight/midline. (See below for objective details and assist levels). Overall pt tolerated session well today with improved mobility noted. Pt would benefit from IRF to address CVA deficits and functional mobility/independence. Will continue to benefit from skilled PT services, and will continue to progress as tolerated. Current Level of Function Impacting Discharge (mobility/balance): R side weakness, Ax2    Other factors to consider for discharge: safety, PLOF, assist at home         PLAN :  Patient continues to benefit from skilled intervention to address the above impairments. Continue treatment per established plan of care to address goals.       Recommendation for discharge: (in order for the patient to meet his/her long term goals)  [facility]     This discharge recommendation:  Has been made in collaboration with the attending provider and/or case management    IF patient discharges home will need the following DME: to be determined (TBD)       SUBJECTIVE:   Patient stated I am doing better today    OBJECTIVE DATA SUMMARY:   Critical Behavior:  Neurologic State: Alert  Orientation Level: Oriented to person  Cognition: Follows commands  Safety/Judgement: Decreased awareness of environment, Decreased awareness of need for assistance, Decreased awareness of need for safety, Decreased insight into deficits  Functional Mobility Training:  Bed Mobility:  Rolling: Minimum assistance; Moderate assistance;Assist x2  Supine to Sit: Minimum assistance;Assist x2  Sit to Supine: Moderate assistance;Assist x2  Scooting: Moderate assistance;Assist x2    Transfers:  Sit to Stand: Maximum assistance;Assist x2  Stand to Sit: Maximum assistance;Assist x2    Balance:  Sitting: Impaired; With support  Sitting - Static: Fair (occasional)  Sitting - Dynamic: Fair (occasional); Poor (constant support)  Standing: Impaired;Pull to stand; With support  Standing - Static: Constant support;Poor      Therapeutic Exercises:       EXERCISE   Sets   Reps   Active Active Assist   Passive Self ROM   Comments   Ankle Pumps  10 [x] [] [] []    Heel Slides  10 [x] [] [] []       Pain Ratin/10    Activity Tolerance:   Fair, requires rest breaks    After treatment patient left in no apparent distress:   Bed locked and returned to lowest position, Supine in bed and Call bell within reach    COMMUNICATION/COLLABORATION:   The patients plan of care was discussed with: Occupational therapy assistant. Cotx with KOHLER for increased safety and assistance. Blair Tovar, PT   Time Calculation: 38 mins         Problem: Mobility Impaired (Adult and Pediatric)  Goal: *Acute Goals and Plan of Care (Insert Text)  Description: FUNCTIONAL STATUS PRIOR TO ADMISSION: Patient was modified independent using a single point cane for functional mobility.     HOME SUPPORT PRIOR TO ADMISSION: The patient lived with 8 years old mother but did not require assist.    Patient stated goal: none verbalized  Patient will move from supine to sit and sit to supine , scoot up and down, and roll side to side in bed with moderate assistance  within 7 day(s). Patient will transfer from bed to chair and chair to bed with moderate assistance  using the least restrictive device within 7 day(s). Patient will improve static sitting balance to minimal assistance within 1 week(s). Patient will ambulate 10 feet with moderate assistance with least restrictive device within 1 weeks.      Outcome: Progressing Towards Goal

## 2023-03-09 VITALS
OXYGEN SATURATION: 96 % | TEMPERATURE: 98.4 F | WEIGHT: 175 LBS | SYSTOLIC BLOOD PRESSURE: 111 MMHG | RESPIRATION RATE: 18 BRPM | HEIGHT: 63 IN | DIASTOLIC BLOOD PRESSURE: 60 MMHG | BODY MASS INDEX: 31.01 KG/M2 | HEART RATE: 103 BPM

## 2023-03-09 LAB
GLUCOSE BLD STRIP.AUTO-MCNC: 224 MG/DL (ref 65–100)
GLUCOSE BLD STRIP.AUTO-MCNC: 247 MG/DL (ref 65–100)
GLUCOSE BLD STRIP.AUTO-MCNC: 274 MG/DL (ref 65–100)
PERFORMED BY, TECHID: ABNORMAL

## 2023-03-09 PROCEDURE — 74011636637 HC RX REV CODE- 636/637: Performed by: NURSE PRACTITIONER

## 2023-03-09 PROCEDURE — 74011000250 HC RX REV CODE- 250: Performed by: NURSE PRACTITIONER

## 2023-03-09 PROCEDURE — 74011250636 HC RX REV CODE- 250/636: Performed by: INTERNAL MEDICINE

## 2023-03-09 PROCEDURE — 82962 GLUCOSE BLOOD TEST: CPT

## 2023-03-09 PROCEDURE — 74011250637 HC RX REV CODE- 250/637: Performed by: NURSE PRACTITIONER

## 2023-03-09 RX ORDER — INSULIN GLARGINE 100 [IU]/ML
20 INJECTION, SOLUTION SUBCUTANEOUS
Qty: 1 ML | Refills: 0 | Status: SHIPPED | OUTPATIENT
Start: 2023-03-09

## 2023-03-09 RX ORDER — LANOLIN ALCOHOL/MO/W.PET/CERES
1000 CREAM (GRAM) TOPICAL DAILY
Qty: 30 TABLET | Refills: 0 | Status: SHIPPED
Start: 2023-03-10

## 2023-03-09 RX ORDER — GUAIFENESIN 100 MG/5ML
81 LIQUID (ML) ORAL DAILY
Qty: 30 TABLET | Refills: 0 | Status: SHIPPED
Start: 2023-03-09

## 2023-03-09 RX ORDER — CLOPIDOGREL BISULFATE 75 MG/1
75 TABLET ORAL DAILY
Qty: 30 TABLET | Refills: 0 | Status: SHIPPED
Start: 2023-03-10

## 2023-03-09 RX ORDER — INSULIN LISPRO 100 [IU]/ML
INJECTION, SOLUTION INTRAVENOUS; SUBCUTANEOUS
Qty: 1 EACH | Refills: 0 | Status: SHIPPED
Start: 2023-03-09

## 2023-03-09 RX ORDER — ATORVASTATIN CALCIUM 80 MG/1
80 TABLET, FILM COATED ORAL
Qty: 30 TABLET | Refills: 0 | Status: SHIPPED
Start: 2023-03-09

## 2023-03-09 RX ADMIN — SODIUM CHLORIDE, PRESERVATIVE FREE 10 ML: 5 INJECTION INTRAVENOUS at 05:23

## 2023-03-09 RX ADMIN — ASPIRIN 81 MG CHEWABLE TABLET 81 MG: 81 TABLET CHEWABLE at 08:43

## 2023-03-09 RX ADMIN — LISINOPRIL 40 MG: 40 TABLET ORAL at 08:43

## 2023-03-09 RX ADMIN — INSULIN LISPRO 9 UNITS: 100 INJECTION, SOLUTION INTRAVENOUS; SUBCUTANEOUS at 08:42

## 2023-03-09 RX ADMIN — CLOPIDOGREL BISULFATE 75 MG: 75 TABLET ORAL at 08:43

## 2023-03-09 RX ADMIN — ENOXAPARIN SODIUM 40 MG: 100 INJECTION SUBCUTANEOUS at 08:43

## 2023-03-09 RX ADMIN — CYANOCOBALAMIN TAB 1000 MCG 1000 MCG: 1000 TAB at 08:43

## 2023-03-09 RX ADMIN — INSULIN LISPRO 6 UNITS: 100 INJECTION, SOLUTION INTRAVENOUS; SUBCUTANEOUS at 11:57

## 2023-03-09 RX ADMIN — AMLODIPINE BESYLATE 10 MG: 5 TABLET ORAL at 08:43

## 2023-03-09 NOTE — PROGRESS NOTES
Problem: Pressure Injury - Risk of  Goal: *Prevention of pressure injury  Description: Document Nitin Scale and appropriate interventions in the flowsheet. 3/8/2023 2009 by Francesca Baumgarten, RN  Outcome: Progressing Towards Goal  Note: Pressure Injury Interventions:  Sensory Interventions: Assess changes in LOC, Keep linens dry and wrinkle-free, Minimize linen layers, Turn and reposition approx. every two hours (pillows and wedges if needed), Float heels    Moisture Interventions: Absorbent underpads, Limit adult briefs, Minimize layers, Moisture barrier, Internal/External urinary devices    Activity Interventions: Pressure redistribution bed/mattress(bed type)    Mobility Interventions: HOB 30 degrees or less, Float heels, Turn and reposition approx. every two hours(pillow and wedges)    Nutrition Interventions: Document food/fluid/supplement intake    Friction and Shear Interventions: HOB 30 degrees or less, Minimize layers, Lift sheet, Apply protective barrier, creams and emollients, Feet elevated on foot rest             3/8/2023 2008 by Francesca Baumgarten, RN  Outcome: Progressing Towards Goal  Note: Pressure Injury Interventions:  Sensory Interventions: Assess changes in LOC, Keep linens dry and wrinkle-free, Minimize linen layers, Turn and reposition approx. every two hours (pillows and wedges if needed), Float heels    Moisture Interventions: Absorbent underpads, Limit adult briefs, Minimize layers, Moisture barrier, Internal/External urinary devices    Activity Interventions: Pressure redistribution bed/mattress(bed type)    Mobility Interventions: HOB 30 degrees or less, Float heels, Turn and reposition approx.  every two hours(pillow and wedges)    Nutrition Interventions: Document food/fluid/supplement intake    Friction and Shear Interventions: HOB 30 degrees or less, Minimize layers, Lift sheet, Apply protective barrier, creams and emollients, Feet elevated on foot rest Problem: Patient Education: Go to Patient Education Activity  Goal: Patient/Family Education  3/8/2023 2009 by Dari Gaytan RN  Outcome: Progressing Towards Goal  3/8/2023 2008 by Dari Gaytan RN  Outcome: Progressing Towards Goal     Problem: Falls - Risk of  Goal: *Absence of Falls  Description: Document [place] Fall Risk and appropriate interventions in the flowsheet.   3/8/2023 2009 by Dari Gaytan RN  Outcome: Progressing Towards Goal  Note: Fall Risk Interventions:       Mentation Interventions: Family/sitter at bedside    Medication Interventions: Bed/chair exit alarm    Elimination Interventions: Bed/chair exit alarm           3/8/2023 2008 by Dari Gaytan RN  Outcome: Progressing Towards Goal  Note: Fall Risk Interventions:       Mentation Interventions: Family/sitter at bedside    Medication Interventions: Bed/chair exit alarm    Elimination Interventions: Bed/chair exit alarm              Problem: Patient Education: Go to Patient Education Activity  Goal: Patient/Family Education  3/8/2023 2009 by Dari Gaytan RN  Outcome: Progressing Towards Goal  3/8/2023 2008 by Dari Gaytan RN  Outcome: Progressing Towards Goal     Problem: Patient Education: Go to Patient Education Activity  Goal: Patient/Family Education  3/8/2023 2009 by Dari Gaytan RN  Outcome: Progressing Towards Goal  3/8/2023 2008 by Dari Gaytan RN  Outcome: Progressing Towards Goal     Problem: TIA/CVA Stroke: 0-24 hours  Goal: Off Pathway (Use only if patient is Off Pathway)  3/8/2023 2009 by Dari Gaytan RN  Outcome: Progressing Towards Goal  3/8/2023 2008 by Dari Gaytan RN  Outcome: Progressing Towards Goal  Goal: Activity/Safety  3/8/2023 2009 by Dari Gaytan RN  Outcome: Progressing Towards Goal  3/8/2023 2008 by Dari Gaytan RN  Outcome: Progressing Towards Goal  Goal: Consults, if ordered  3/8/2023 2009 by Mercedez Oconnor RN  Outcome: Progressing Towards Goal  3/8/2023 2008 by Mercedez Oconnor RN  Outcome: Progressing Towards Goal  Goal: Diagnostic Test/Procedures  3/8/2023 2009 by Mercedez Oconnor RN  Outcome: Progressing Towards Goal  3/8/2023 2008 by Mercedez Oconnor RN  Outcome: Progressing Towards Goal  Goal: Nutrition/Diet  3/8/2023 2009 by Mercedez Oconnor RN  Outcome: Progressing Towards Goal  3/8/2023 2008 by Mercedez Oconnor RN  Outcome: Progressing Towards Goal  Goal: Discharge Planning  3/8/2023 2009 by Mercedez Oconnor RN  Outcome: Progressing Towards Goal  3/8/2023 2008 by Mercedez Oconnor RN  Outcome: Progressing Towards Goal  Goal: Medications  3/8/2023 2009 by Mercedez Oconnor RN  Outcome: Progressing Towards Goal  3/8/2023 2008 by Mercedez Oconnor RN  Outcome: Progressing Towards Goal  Goal: Respiratory  3/8/2023 2009 by Mercedez Oconnor RN  Outcome: Progressing Towards Goal  3/8/2023 2008 by Mercedez Oconnor RN  Outcome: Progressing Towards Goal  Goal: Treatments/Interventions/Procedures  3/8/2023 2009 by Mercedez Oconnor RN  Outcome: Progressing Towards Goal  3/8/2023 2008 by Mercedez Oconnor RN  Outcome: Progressing Towards Goal  Goal: Minimize risk of bleeding post-thrombolytic infusion  3/8/2023 2009 by Mercedez Oconnor RN  Outcome: Progressing Towards Goal  3/8/2023 2008 by Mercedez Oconnor RN  Outcome: Progressing Towards Goal  Goal: Monitor for complications post-thrombolytic infusion  3/8/2023 2009 by Mercedez Oconnor RN  Outcome: Progressing Towards Goal  3/8/2023 2008 by Mercedez Oconnor RN  Outcome: Progressing Towards Goal  Goal: Psychosocial  3/8/2023 2009 by Mercedez Oconnor RN  Outcome: Progressing Towards Goal  3/8/2023 2008 by Mercdeez Oconnor RN  Outcome: Progressing Towards Goal  Goal: *Hemodynamically stable  3/8/2023 2009 by Mercedez Oconnor RN  Outcome: Progressing Towards Goal  3/8/2023 2008 by Babs Dawson RN  Outcome: Progressing Towards Goal  Goal: *Neurologically stable  Description: Absence of additional neurological deficits    3/8/2023 2009 by Babs Dawson RN  Outcome: Progressing Towards Goal  3/8/2023 2008 by Babs Dawson RN  Outcome: Progressing Towards Goal  Goal: *Verbalizes anxiety and depression are reduced or absent  3/8/2023 2009 by Babs Dawson RN  Outcome: Progressing Towards Goal  3/8/2023 2008 by Babs Dawson RN  Outcome: Progressing Towards Goal  Goal: *Absence of Signs of Aspiration on Current Diet  3/8/2023 2009 by Babs Dawson RN  Outcome: Progressing Towards Goal  3/8/2023 2008 by Babs Dawson RN  Outcome: Progressing Towards Goal  Goal: *Absence of deep venous thrombosis signs and symptoms(Stroke Metric)  3/8/2023 2009 by Babs Dawson RN  Outcome: Progressing Towards Goal  3/8/2023 2008 by Babs Dawson RN  Outcome: Progressing Towards Goal  Goal: *Ability to perform ADLs and demonstrates progressive mobility and function  3/8/2023 2009 by Babs Dawson RN  Outcome: Progressing Towards Goal  3/8/2023 2008 by Babs Dawson RN  Outcome: Progressing Towards Goal  Goal: *Stroke education started(Stroke Metric)  3/8/2023 2009 by Babs Dawson RN  Outcome: Progressing Towards Goal  3/8/2023 2008 by Babs Dawson RN  Outcome: Progressing Towards Goal  Goal: *Dysphagia screen performed(Stroke Metric)  3/8/2023 2009 by Babs Dawson RN  Outcome: Progressing Towards Goal  3/8/2023 2008 by Babs Dawson RN  Outcome: Progressing Towards Goal  Goal: *Rehab consulted(Stroke Metric)  3/8/2023 2009 by Babs Dawson RN  Outcome: Progressing Towards Goal  3/8/2023 2008 by Babs Dawson RN  Outcome: Progressing Towards Goal     Problem: Patient Education: Go to Patient Education Activity  Goal: Patient/Family Education  Description: Patient to participate in automatic speech tasks: count 1-10 with 60% accuracy  Patient to name/label environmental items with 60% accuracy. Patient to ID from a F:2 with 60% accuracy. Patient to follow 1 step commands with 60% accuracy. Patient orientation x4 with external aids.    3/8/2023 2009 by Kinsey Forbes RN  Outcome: Progressing Towards Goal  3/8/2023 2008 by Kinsey Forbes RN  Outcome: Progressing Towards Goal     Problem: Patient Education: Go to Patient Education Activity  Goal: Patient/Family Education  3/8/2023 2009 by Kinsey Forbes RN  Outcome: Progressing Towards Goal  3/8/2023 2008 by Kinsey Forbes RN  Outcome: Progressing Towards Goal     Problem: Delirium Treatment  Goal: *Level of consciousness restored to baseline  3/8/2023 2009 by Kinsey Forbes RN  Outcome: Progressing Towards Goal  3/8/2023 2008 by Kinsey Forbes RN  Outcome: Progressing Towards Goal  Goal: *Level of environmental perceptions restored to baseline  3/8/2023 2009 by Kinsey Forbes RN  Outcome: Progressing Towards Goal  3/8/2023 2008 by Kinsey Forbes RN  Outcome: Progressing Towards Goal  Goal: *Sensory perception restored to baseline  3/8/2023 2009 by Kinsey Forbes RN  Outcome: Progressing Towards Goal  3/8/2023 2008 by Kinsey Forbes RN  Outcome: Progressing Towards Goal  Goal: *Emotional stability restored to baseline  3/8/2023 2009 by Kinsey Forbes RN  Outcome: Progressing Towards Goal  3/8/2023 2008 by Kinsey Forbes RN  Outcome: Progressing Towards Goal  Goal: *Functional assessment restored to baseline  3/8/2023 2009 by Kinsey Forbes RN  Outcome: Progressing Towards Goal  3/8/2023 2008 by Kinsey Forbes RN  Outcome: Progressing Towards Goal  Goal: *Absence of falls  3/8/2023 2009 by Kinsey Forbes RN  Outcome: Progressing Towards Goal  3/8/2023 2008 by Antonieta Quiroga RN  Outcome: Progressing Towards Goal  Goal: *Will remain free of delirium, CAM Score negative  3/8/2023 2009 by Antonieta Quiroga RN  Outcome: Progressing Towards Goal  3/8/2023 2008 by Antonieta Quiroga RN  Outcome: Progressing Towards Goal  Goal: *Cognitive status will be restored to baseline  3/8/2023 2009 by Antonieta Quiroga RN  Outcome: Progressing Towards Goal  3/8/2023 2008 by Antonieta Quiroga RN  Outcome: Progressing Towards Goal  Goal: Interventions  3/8/2023 2009 by Antonieta Quiroga RN  Outcome: Progressing Towards Goal  3/8/2023 2008 by Antonieta Quiroga RN  Outcome: Progressing Towards Goal     Problem: Patient Education: Go to Patient Education Activity  Goal: Patient/Family Education  3/8/2023 2009 by Antonieta Quiroga RN  Outcome: Progressing Towards Goal  3/8/2023 2008 by Antonieta Quiroga RN  Outcome: Progressing Towards Goal     Problem: Patient Education: Go to Patient Education Activity  Goal: Patient/Family Education  3/8/2023 2009 by Antonieta Quiroga RN  Outcome: Progressing Towards Goal  3/8/2023 2008 by Antonieta Quiroga RN  Outcome: Progressing Towards Goal     Problem: Diabetes Self-Management  Goal: *Disease process and treatment process  Description: Define diabetes and identify own type of diabetes; list 3 options for treating diabetes. 3/8/2023 2009 by Antonieta Quiroga RN  Outcome: Progressing Towards Goal  3/8/2023 2008 by Antonieta Quiroga RN  Outcome: Progressing Towards Goal  Goal: *Incorporating nutritional management into lifestyle  Description: Describe effect of type, amount and timing of food on blood glucose; list 3 methods for planning meals.   3/8/2023 2009 by Antonieta Quiroga RN  Outcome: Progressing Towards Goal  3/8/2023 2008 by Antonieta Quiroga RN  Outcome: Progressing Towards Goal  Goal: *Incorporating physical activity into lifestyle  Description: State effect of exercise on blood glucose levels. 3/8/2023 2009 by Francesca Baumgarten, RN  Outcome: Progressing Towards Goal  3/8/2023 2008 by Francesca Baumgarten, RN  Outcome: Progressing Towards Goal  Goal: *Developing strategies to promote health/change behavior  Description: Define the ABC's of diabetes; identify appropriate screenings, schedule and personal plan for screenings. 3/8/2023 2009 by Francesca Baumgarten, RN  Outcome: Progressing Towards Goal  3/8/2023 2008 by Francesca Baumgarten, RN  Outcome: Progressing Towards Goal  Goal: *Using medications safely  Description: State effect of diabetes medications on diabetes; name diabetes medication taking, action and side effects. 3/8/2023 2009 by Francesca Baumgarten, RN  Outcome: Progressing Towards Goal  3/8/2023 2008 by Francesca Baumgarten, RN  Outcome: Progressing Towards Goal  Goal: *Monitoring blood glucose, interpreting and using results  Description: Identify recommended blood glucose targets  and personal targets. 3/8/2023 2009 by Francesca Baumgarten, RN  Outcome: Progressing Towards Goal  3/8/2023 2008 by Francesca Baumgarten, RN  Outcome: Progressing Towards Goal  Goal: *Prevention, detection, treatment of acute complications  Description: List symptoms of hyper- and hypoglycemia; describe how to treat low blood sugar and actions for lowering  high blood glucose level. 3/8/2023 2009 by Francesca Baumgarten, RN  Outcome: Progressing Towards Goal  3/8/2023 2008 by Francesca Baumgarten, RN  Outcome: Progressing Towards Goal  Goal: *Prevention, detection and treatment of chronic complications  Description: Define the natural course of diabetes and describe the relationship of blood glucose levels to long term complications of diabetes.   3/8/2023 2009 by Francesca Baumgarten, RN  Outcome: Progressing Towards Goal  3/8/2023 2008 by Francesca Baumgarten, RN  Outcome: Progressing Towards Goal  Goal: *Developing strategies to address psychosocial issues  Description: Describe feelings about living with diabetes; identify support needed and support network  3/8/2023 2009 by Jael Ochoa RN  Outcome: Progressing Towards Goal  3/8/2023 2008 by Jael Ochoa RN  Outcome: Progressing Towards Goal  Goal: *Insulin pump training  3/8/2023 2009 by Jael Ochoa RN  Outcome: Progressing Towards Goal  3/8/2023 2008 by Jael Ochoa RN  Outcome: Progressing Towards Goal  Goal: *Sick day guidelines  3/8/2023 2009 by Jael Ochoa RN  Outcome: Progressing Towards Goal  3/8/2023 2008 by Jael Ochoa RN  Outcome: Progressing Towards Goal  Goal: *Patient Specific Goal (EDIT GOAL, INSERT TEXT)  3/8/2023 2009 by Jael Ochoa RN  Outcome: Progressing Towards Goal  3/8/2023 2008 by Jael Ochoa RN  Outcome: Progressing Towards Goal     Problem: Patient Education: Go to Patient Education Activity  Goal: Patient/Family Education  3/8/2023 2009 by Jael Ochoa RN  Outcome: Progressing Towards Goal  3/8/2023 2008 by Jael Ochoa RN  Outcome: Progressing Towards Goal     Problem: Patient Education: Go to Patient Education Activity  Goal: Patient/Family Education  3/8/2023 2009 by Jael Ochoa RN  Outcome: Progressing Towards Goal  3/8/2023 2008 by Jael Ochoa RN  Outcome: Progressing Towards Goal

## 2023-03-09 NOTE — PROGRESS NOTES
Hospitalist Progress Note               Daily Progress Note: 3/9/2023      Subjective:   Hospital course to date:    Patient is a 77 yo female with a PMH of type 2 DM, HTN, previous smoker, and CVA that occurred in 2021 without residual symptoms who was admitted from the ED on 3/3 for slurred speech and and right-sided weakness. Exam shows right upper extremity drift and expressive aphasia. MRI of the head without contrast showed acute to subacute infarcts in the left cerebral hemisphere within the left frontoparietal deep white matter, and in a pattern suggestive of watershed infarction. Patient was admitted, kept n.p.o. initially. She was seen by speech therapy who did not feel she was safe for oral intake. NG tube was placed for medications and initiation of enteral nutrition    Discussion with son led to a DNR order  ---------------  3/6/23:  Patient is seen for follow-up. Overall, clinical edition unchanged. Still flaccid paralysis on the right. Patient still drooling out of the right side of her mouth. Discussed with speech therapy this morning, they would like to wait 1 more day before trying modified barium swallow. However, at this time it seems highly likely she will need a PEG tube    3/7/23:  Patient seen, chart was reviewed. Patient has NG tube, for MBS today. Sitter at bedside. No other acute issues reported to me by staff at this time. No recent labs to review    3/8/23:  Patient seen and examined, chart was reviewed. NG tube was removed, MBS done by SLP yesterday. SLP started diet but patient has decreased p.o. intake per staff. No other acute issues reported to me by staff at this time. 3/9/23:  Patient seen and examined, chart was reviewed. Patient tolerating modified diet per nursing staff. Insurance Auth obtained to go to [facility] rehab. Medically stable will discharge today. No other acute issues reported to me by staff at this time.         Medications reviewed  Current Facility-Administered Medications   Medication Dose Route Frequency    hydrALAZINE (APRESOLINE) 20 mg/mL injection 20 mg  20 mg IntraVENous Q6H PRN    enoxaparin (LOVENOX) injection 40 mg  40 mg SubCUTAneous Q24H    aspirin chewable tablet 81 mg  81 mg Oral DAILY    clopidogreL (PLAVIX) tablet 75 mg  75 mg Oral DAILY    cyanocobalamin tablet 1,000 mcg  1,000 mcg Oral DAILY    insulin glargine (LANTUS) injection 14 Units  14 Units SubCUTAneous QHS    insulin lispro (HUMALOG) injection   SubCUTAneous AC&HS    glucose chewable tablet 16 g  4 Tablet Oral PRN    glucagon (GLUCAGEN) injection 1 mg  1 mg IntraMUSCular PRN    dextrose 10% infusion 0-250 mL  0-250 mL IntraVENous PRN    sodium chloride (NS) flush 5-40 mL  5-40 mL IntraVENous Q8H    sodium chloride (NS) flush 5-40 mL  5-40 mL IntraVENous PRN    acetaminophen (TYLENOL) tablet 650 mg  650 mg Oral Q6H PRN    Or    acetaminophen (TYLENOL) suppository 650 mg  650 mg Rectal Q6H PRN    polyethylene glycol (MIRALAX) packet 17 g  17 g Oral DAILY PRN    ondansetron (ZOFRAN ODT) tablet 4 mg  4 mg Oral Q8H PRN    Or    ondansetron (ZOFRAN) injection 4 mg  4 mg IntraVENous Q6H PRN    atorvastatin (LIPITOR) tablet 80 mg  80 mg Oral QHS    [Held by provider] alogliptin (NESINA) tablet 12.5 mg  12.5 mg Oral DAILY    And    [Held by provider] metFORMIN (GLUCOPHAGE) tablet 1,000 mg  1,000 mg Oral BID WITH MEALS    amLODIPine (NORVASC) tablet 10 mg  10 mg Oral DAILY    And    lisinopriL (PRINIVIL, ZESTRIL) tablet 40 mg  40 mg Oral DAILY       Objective:   Physical Exam:     Visit Vitals  /63 (BP Patient Position: At rest;Semi fowlers)   Pulse (!) 104   Temp 97.7 °F (36.5 °C)   Resp 18   Ht 5' 3\" (1.6 m)   Wt 79.4 kg (175 lb)   SpO2 96%   BMI 31.00 kg/m²      O2 Device: None (Room air)    Temp (24hrs), Av.3 °F (36.8 °C), Min:97.7 °F (36.5 °C), Max:98.9 °F (37.2 °C)    No intake/output data recorded.     1901 -  0700  In: 300 [P.O.:300]  Out: 650 [Urine:650]    General:  WDWN    Lungs:   Symmetric expansion   Chest wall:  No deformity   Heart:  RRR   Abdomen:   Soft, nontender, obese   Extremities: Mild edema       Skin: No visible rash   Neurologic: Right facial droop, right dense hemiplegia. Data Review:         24 Hour Results:  Recent Results (from the past 24 hour(s))   GLUCOSE, POC    Collection Time: 03/08/23 11:09 AM   Result Value Ref Range    Glucose (POC) 298 (H) 65 - 100 mg/dL    Performed by Genet MYRICK    CBC WITH AUTOMATED DIFF    Collection Time: 03/08/23 11:53 AM   Result Value Ref Range    WBC 5.7 3.6 - 11.0 K/uL    RBC 3.48 (L) 3.80 - 5.20 M/uL    HGB 11.4 (L) 11.5 - 16.0 g/dL    HCT 33.6 (L) 35.0 - 47.0 %    MCV 96.6 80.0 - 99.0 FL    MCH 32.8 26.0 - 34.0 PG    MCHC 33.9 30.0 - 36.5 g/dL    RDW 13.1 11.5 - 14.5 %    PLATELET 666 973 - 020 K/uL    MPV 9.5 8.9 - 12.9 FL    NRBC 0.0 0.0  WBC    ABSOLUTE NRBC 0.00 0.00 - 0.01 K/uL    NEUTROPHILS 76 (H) 32 - 75 %    LYMPHOCYTES 10 (L) 12 - 49 %    MONOCYTES 13 5 - 13 %    EOSINOPHILS 1 0 - 7 %    BASOPHILS 0 0 - 1 %    IMMATURE GRANULOCYTES 0 0 - 0.5 %    ABS. NEUTROPHILS 4.4 1.8 - 8.0 K/UL    ABS. LYMPHOCYTES 0.6 (L) 0.8 - 3.5 K/UL    ABS. MONOCYTES 0.7 0.0 - 1.0 K/UL    ABS. EOSINOPHILS 0.1 0.0 - 0.4 K/UL    ABS. BASOPHILS 0.0 0.0 - 0.1 K/UL    ABS. IMM.  GRANS. 0.0 0.00 - 0.04 K/UL    DF AUTOMATED     RENAL FUNCTION PANEL    Collection Time: 03/08/23 11:53 AM   Result Value Ref Range    Sodium 132 (L) 136 - 145 mmol/L    Potassium 4.0 3.5 - 5.1 mmol/L    Chloride 102 97 - 108 mmol/L    CO2 25 21 - 32 mmol/L    Anion gap 5 5 - 15 mmol/L    Glucose 312 (H) 65 - 100 mg/dL    BUN 33 (H) 6 - 20 mg/dL    Creatinine 0.94 0.55 - 1.02 mg/dL    BUN/Creatinine ratio 35 (H) 12 - 20      eGFR >60 >60 ml/min/1.73m2    Calcium 8.8 8.5 - 10.1 mg/dL    Phosphorus 3.5 2.6 - 4.7 mg/dL    Albumin 2.7 (L) 3.5 - 5.0 g/dL   MAGNESIUM    Collection Time: 03/08/23 11:53 AM   Result Value Ref Range    Magnesium 2.1 1.6 - 2.4 mg/dL   GLUCOSE, POC    Collection Time: 03/08/23  4:36 PM   Result Value Ref Range    Glucose (POC) 171 (H) 65 - 100 mg/dL    Performed by Elle Mcconnell, POC    Collection Time: 03/08/23  7:21 PM   Result Value Ref Range    Glucose (POC) 209 (H) 65 - 100 mg/dL    Performed by Dmitriy Nguyen, POC    Collection Time: 03/09/23  7:01 AM   Result Value Ref Range    Glucose (POC) 274 (H) 65 - 100 mg/dL    Performed by Joss Cardenas        XR SWALLOW FUNC VIDEO   Final Result   There was no penetration or aspiration. There was delay in   initiation of swallowing mechanism. There is also fair amount of perioral   spillage externally. Please see speech therapy documentation in the chart for further details. Fluoroscopy time: 1 minute 31 seconds   Air kerma (radiation dose): 65.8 mGy         XR CHEST PORT   Final Result   Satisfactory NG tube placement. No Acute Disease. DUPLEX CAROTID BILATERAL   Final Result      MRI BRAIN WO CONT   Final Result      1. Multiple acute to subacute infarcts in the left cerebral hemisphere   predominantly within the left frontoparietal deep white matter, and in a pattern   suggestive of watershed infarction. 2. Minimal chronic microvascular ischemic disease. Chronic infarcts in the   bilateral basal ganglia, left larger than right, and left cerebellum. XR CHEST PORT   Final Result   No acute process identified. CTA CODE NEURO HEAD AND NECK W CONT   Final Result   CTA Head:   1. No evidence of flow-limiting stenosis or aneurysm. Scattered atherosclerotic   disease as above, including severe stenosis in the right P2 segment, which is   unchanged. CTA Neck:   1. No evidence of flow-limiting stenosis. 2. Evaluation of the carotid bifurcations is limited by motion, though there are   suspected moderate stenoses of the bilateral proximal internal carotid arteries,   right worse than left. CT CODE NEURO HEAD WO CONTRAST   Final Result   Chronic ischemic changes. No acute findings.                  Assessment with MDM/DDx:  Acute CVA with right hemiplegia and dysphagia  Hypertension-BP variable  DMT2-uncontrolled with hyperglycemia  Previous CVA-without any residual symptoms  Moderate right carotid and mild left carotid stenosis  Mild hyponatremia-        Plan-  On aspirin/Plavix and statins  S/p NGT  Diet as per SLP tolerating current diet  May need PEG tube in future if fails swallowing  Continue other medications    Monitor labs  at rehab  Monitor BP at rehab  Monitor BG adjust accordingly at rehab  Monitor clinical course  at rehab    Advance directive-DNR per chart  DVT prophylaxis-subcu Lovenox  Next of kin-son   Dispo-rehab facility  today           Ethan Duncan MD

## 2023-03-09 NOTE — PROGRESS NOTES
Face to face meeting completed with Shayne Anaya PT regarding current status and progress of   Hernandez Hernandez .  Edwin Peck, PTA    Report given to [facility] RN at [facility].

## 2023-03-09 NOTE — PROGRESS NOTES
Spiritual Care Assessment/Progress Note  [facility]      NAME: Aleena Murry      MRN: [id number]  AGE: 78 y.o.  SEX: female  Jew Affiliation: Restorationist   Language: English     3/9/2023     Total Time (in minutes): 11     Spiritual Assessment begun in [facility] through conversation with:         [x]Patient        [x] Family    [] Friend(s)        Reason for Consult: Initial/Spiritual assessment, patient floor     Spiritual beliefs: (Please include comment if needed)     [x] Identifies with a maged tradition: Shazia        [] Supported by a maged community:  [employer]         [] Claims no spiritual orientation:           [] Seeking spiritual identity:                [] Adheres to an individual form of spirituality:           [] Not able to assess:                            Identified resources for coping:      [x] Prayer                               [] Music                  [] Guided Imagery     [] Family/friends                 [] Pet visits     [x] Devotional reading                         [] Unknown     [] Other:                                               Interventions offered during this visit: (See comments for more details)    Patient Interventions: Affirmation of maged, Initial/Spiritual assessment, patient floor, Prayer (actual), Iconic (affirming the presence of God/Higher Power)     Family/Friend(s): Prayer (actual), Initial Assessment     Plan of Care:     [] Support spiritual and/or cultural needs    [] Support AMD and/or advance care planning process      [] Support grieving process   [] Coordinate Rites and/or Rituals    [] Coordination with community clergy   [] No spiritual needs identified at this time   [] Detailed Plan of Care below (See Comments)  [] Make referral to Music Therapy  [] Make referral to Pet Therapy     [] Make referral to Addiction services  [] Make referral to [facility]  [] Make referral to Spiritual Care Partner  [] No future visits requested        [x] Contact Spiritual Care for further referrals     Comments: I visited Ms. Salomon while rounding on [address] Ms. Salomon's son, Ethan Denson, was present. Ms. Lee Ann Gutierrez shared she is fine this morning. Ms. Lee Ann Gutierrez demonstrated some challenges speaking. Ethan Denson informed me Ms. Lee Ann Gutierrez has actively attended her [employer] for 30 years. I provided prayer and spiritual support. Spiritual Care remains available if needed. Rev.  Isidro Nichole, [address]

## 2023-03-09 NOTE — PROGRESS NOTES
Problem: Pressure Injury - Risk of  Goal: *Prevention of pressure injury  Description: Document Nitin Scale and appropriate interventions in the flowsheet.   Outcome: Progressing Towards Goal  Note: Pressure Injury Interventions:  Sensory Interventions: Assess changes in LOC    Moisture Interventions: Absorbent underpads    Activity Interventions: Pressure redistribution bed/mattress(bed type)    Mobility Interventions: Float heels    Nutrition Interventions: Document food/fluid/supplement intake    Friction and Shear Interventions: HOB 30 degrees or less

## 2023-03-09 NOTE — DISCHARGE SUMMARY
Hospitalist Discharge Summary     Patient ID:  Laura Jarquin  [id number]  78 y.o.  1945  3/3/2023    PCP on record: Saturnino Perez MD    Admit date: 3/3/2023  Discharge date and time: 3/9/2023    DISCHARGE DIAGNOSIS:  Acute CVA with dysarthria/right hemiplegia  HTN  DMT2  Bilateral moderate carotid stenosis  Previous history of CVA    CONSULTATIONS: Neurology       Excerpted HPI from H&P of Anatoliy Interiano MD:    Laura Jarquin is a 78 y.o. female who has a past medical history of type 2 diabetes, hypertension and CVA 2021, with no residual per her daughter Mayo Valencia. Her daughter reports she went to see her this morning around 1015 and the patient's speech was slurred with right side weakness. Her daughter brought her to the ED. She is an ex-smoker and has not smoked in years. On exam she has right sided facial drooping, right upper extremity drift. Right leg weakness, expressive aphasia. CT of head on admission shows chronic ischemia with no changes, no acute findings. MRI shows 1. Multiple acute to subacute infarcts in the left cerebral hemisphere predominantly within the left frontoparietal deep white matter, and in a pattern suggestive of watershed infarction. 2. Minimal chronic microvascular ischemic disease. Chronic infarcts in the bilateral basal ganglia, left larger than right, and left cerebellum. EKG shows Normal Sinus Rhythm. Ms. Nance Sample admitted with CVA for further evaluation and treatment. Labs on admission:  WBC 7.5, HgB 12.1, Platelet 281, Sodium 132, Potassium 4.6, Glucose 276, Creatinine 1.06. normal LFT's. Troponin 4. Lipid panel, TSH,and HgBA1C pending. Urinalysis pending. Speech therapy, OT/PT consult. Modified Barium swallow pending. Neurology consult.        ______________________________________________________________________  DISCHARGE SUMMARY/HOSPITAL COURSE:  for full details see H&P, daily progress notes, labs, consult notes.    Assessment with MDM/DDx:  Acute CVA with right hemiplegia and dysphagia  Hypertension-BP variable  DMT2-uncontrolled with hyperglycemia  Previous CVA-without any residual symptoms  Moderate right carotid and mild left carotid stenosis  Mild hyponatremia-        Plan-  On aspirin/Plavix and statins  S/p NGT  Diet as per SLP tolerating current diet  May need PEG tube in future if fails swallowing  Continue other medications    Monitor labs  at rehab  Monitor BP at rehab  Monitor BG adjust accordingly at rehab  Monitor clinical course  at rehab    Advance directive-DNR per chart  DVT prophylaxis-subcu Lovenox  Next of kin-son   Dispo-rehab facility  today          _______________________________________________________________________  Patient seen and examined by me on discharge day. Patient maximized inpatient benefit stay, stable for discharge to rehab facility if bed available today. Discussed with son at bedside yesterday answered all questions to best satisfaction. Son agreed and verbalized understanding of discharge plan and instructions at this time. Advise close follow-up with neurology postdischarge for bilateral carotid stenosis. ___________________________________________________________  DISCHARGE MEDICATIONS:   Current Discharge Medication List        START taking these medications    Details   cyanocobalamin 1,000 mcg tablet Take 1 Tablet by mouth daily. Qty: 30 Tablet, Refills: 0  Start date: 3/10/2023      insulin glargine (LANTUS) 100 unit/mL injection 20 Units by SubCUTAneous route nightly. Hold if BG less than 100  Qty: 1 mL, Refills: 0  Start date: 3/9/2023      atorvastatin (LIPITOR) 80 mg tablet Take 1 Tablet by mouth nightly.   Qty: 30 Tablet, Refills: 0  Start date: 3/9/2023      insulin lispro (HUMALOG) 100 unit/mL injection As directed above  Qty: 1 Each, Refills: 0  Start date: 3/9/2023           CONTINUE these medications which have CHANGED    Details   clopidogreL (PLAVIX) 75 mg tab Take 1 Tablet by mouth daily. Qty: 30 Tablet, Refills: 0  Start date: 3/10/2023      aspirin 81 mg chewable tablet Take 1 Tablet by mouth daily. Qty: 30 Tablet, Refills: 0  Start date: 3/9/2023           CONTINUE these medications which have NOT CHANGED    Details   Janumet 50-1,000 mg per tablet Take 1 Tablet by mouth two (2) times a day. amLODIPine-benazepril (LOTREL) 10-40 mg per capsule Take 1 Capsule by mouth daily. ibandronate (BONIVA) 150 mg tablet       ergocalciferol (ERGOCALCIFEROL) 1,250 mcg (50,000 unit) capsule Take 50,000 Units by mouth every seven (7) days. STOP taking these medications       insulin glargine (Lantus Solostar U-100 Insulin) 100 unit/mL (3 mL) inpn Comments:   Reason for Stopping:         atenoloL-chlorthalidone (TENORETIC) 50-25 mg per tablet Comments:   Reason for Stopping:         cyanocobalamin (VITAMIN B-12) 1,000 mcg sublingual tablet Comments:   Reason for Stopping:                 Patient Follow Up Instructions:   Diet-continue diet as per SLP   \"Puréed with moderate honey thick consistency   Strict aspiration precautions  Crush meds  All PO feed via spoon, REST BREAKS w/ slow rate of intake. Cue for fast hard swallow. \"   Activity-slowly increase to levels as before  Strict aspiration/fall/pressure ulcer precautions  Continue current local wound care  Accu-Cheks before every meal and at bedtime call MD if less than 70 or more than 299  Hold BP meds if SBP less than 110  Hold insulin if BG less than 100  Check labs next week CBC/BMP  Return to ED or call 911 immediately if symptoms recur or get worse    Follow-up Information       Follow up With Specialties Details Why Vasquez Porter MD Internal Medicine Physician Follow up in 1 week(s) Posthospital discharge care follow-up [address]      Susie Solo MD Neurology Follow up in 4 week(s) [facility]hospital [facility]  [phone]            ________________________________________________________________    Risk of deterioration: Moderate    Condition at Discharge:  Stable  __________________________________________________________________    Disposition  IP Rehab    ____________________________________________________________________    Code Status: DNR  ___________________________________________________________________      Total time in minutes spent coordinating this discharge  35 minutes    Signed:  MD Melyssa Silva

## 2023-03-09 NOTE — DISCHARGE INSTRUCTIONS
Diet-continue diet as per SLP  \"Puréed with moderate honey thick consistency   Strict aspiration precautions  Crush meds  All PO feed via spoon, REST BREAKS w/ slow rate of intake. Cue for fast hard swallow. \"   Activity-slowly increase to levels as before  Strict aspiration/fall/pressure ulcer precautions  Continue current local wound care  Accu-Cheks before every meal and at bedtime call MD if less than 70 or more than 299  Hold BP meds if SBP less than 110  Hold insulin if BG less than 100  Check labs next week CBC/BMP  Return to ED or call 911 immediately if symptoms recur or get worse

## 2023-03-09 NOTE — PROGRESS NOTES
Patient has been accepted to admit to [facility] re[facility] hospital located at [address] [place] [address]. Nurse to call report to [phone]. Primary nurse notified. CALI called [facility] [phone] and spoke with omid, she is requesting PT note to be faxed to [phone] and she will call back if patient is approved.  [facility] wants patient there at 3pm.

## 2023-03-22 ENCOUNTER — HOSPITAL ENCOUNTER (OUTPATIENT)
Dept: LAB | Age: 78
Discharge: HOME | End: 2023-03-22

## 2023-03-22 LAB
ANION GAP SERPL CALC-SCNC: 6 MMOL/L (ref 5–15)
BASOPHILS # BLD: 0 K/UL (ref 0–0.1)
BASOPHILS NFR BLD: 0 % (ref 0–1)
BUN SERPL-MCNC: 51 MG/DL (ref 6–20)
BUN/CREAT SERPL: 42 (ref 12–20)
CA-I BLD-MCNC: 8.9 MG/DL (ref 8.5–10.1)
CHLORIDE SERPL-SCNC: 109 MMOL/L (ref 97–108)
CO2 SERPL-SCNC: 23 MMOL/L (ref 21–32)
CREAT SERPL-MCNC: 1.21 MG/DL (ref 0.55–1.02)
DIFFERENTIAL METHOD BLD: ABNORMAL
EOSINOPHIL # BLD: 0.1 K/UL (ref 0–0.4)
EOSINOPHIL NFR BLD: 1 % (ref 0–7)
ERYTHROCYTE [DISTWIDTH] IN BLOOD BY AUTOMATED COUNT: 12.9 % (ref 11.5–14.5)
GLUCOSE SERPL-MCNC: 161 MG/DL (ref 65–100)
HCT VFR BLD AUTO: 36.8 % (ref 35–47)
HGB BLD-MCNC: 11.6 G/DL (ref 11.5–16)
IMM GRANULOCYTES # BLD AUTO: 0.1 K/UL (ref 0–0.04)
IMM GRANULOCYTES NFR BLD AUTO: 1 % (ref 0–0.5)
LYMPHOCYTES # BLD: 0.9 K/UL (ref 0.8–3.5)
LYMPHOCYTES NFR BLD: 10 % (ref 12–49)
MCH RBC QN AUTO: 31.4 PG (ref 26–34)
MCHC RBC AUTO-ENTMCNC: 31.5 G/DL (ref 30–36.5)
MCV RBC AUTO: 99.5 FL (ref 80–99)
MONOCYTES # BLD: 0.6 K/UL (ref 0–1)
MONOCYTES NFR BLD: 7 % (ref 5–13)
NEUTS SEG # BLD: 7 K/UL (ref 1.8–8)
NEUTS SEG NFR BLD: 81 % (ref 32–75)
NRBC # BLD: 0 K/UL (ref 0–0.01)
NRBC BLD-RTO: 0 PER 100 WBC
PLATELET # BLD AUTO: 447 K/UL (ref 150–400)
PMV BLD AUTO: 10.4 FL (ref 8.9–12.9)
POTASSIUM SERPL-SCNC: 5 MMOL/L (ref 3.5–5.1)
RBC # BLD AUTO: 3.7 M/UL (ref 3.8–5.2)
SODIUM SERPL-SCNC: 138 MMOL/L (ref 136–145)
WBC # BLD AUTO: 8.7 K/UL (ref 3.6–11)

## 2023-03-22 PROCEDURE — 85025 COMPLETE CBC W/AUTO DIFF WBC: CPT

## 2023-03-22 PROCEDURE — 80048 BASIC METABOLIC PNL TOTAL CA: CPT

## 2023-03-24 ENCOUNTER — HOSPITAL ENCOUNTER (OUTPATIENT)
Dept: LAB | Age: 78
Discharge: HOME | End: 2023-03-24

## 2023-03-24 LAB
APPEARANCE UR: ABNORMAL
BACTERIA URNS QL MICRO: NEGATIVE /HPF
BILIRUB UR QL: NEGATIVE
COLOR UR: ABNORMAL
EPITH CASTS URNS QL MICRO: ABNORMAL /LPF
GLUCOSE UR STRIP.AUTO-MCNC: NEGATIVE MG/DL
HGB UR QL STRIP: ABNORMAL
KETONES UR QL STRIP.AUTO: NEGATIVE MG/DL
LEUKOCYTE ESTERASE UR QL STRIP.AUTO: ABNORMAL
MUCOUS THREADS URNS QL MICRO: ABNORMAL /LPF
NITRITE UR QL STRIP.AUTO: NEGATIVE
PH UR STRIP: 5 (ref 5–8)
PROT UR STRIP-MCNC: 30 MG/DL
RBC #/AREA URNS HPF: ABNORMAL /HPF (ref 0–5)
SP GR UR REFRACTOMETRY: 1.02 (ref 1–1.03)
UROBILINOGEN UR QL STRIP.AUTO: 0.1 EU/DL (ref 0.1–1)
WBC URNS QL MICRO: ABNORMAL /HPF (ref 0–4)

## 2023-03-24 PROCEDURE — 81001 URINALYSIS AUTO W/SCOPE: CPT

## 2023-03-24 PROCEDURE — 87086 URINE CULTURE/COLONY COUNT: CPT

## 2023-03-26 LAB
BACTERIA SPEC CULT: NORMAL
SPECIAL REQUESTS,SREQ: NORMAL

## 2023-03-28 ENCOUNTER — HOSPITAL ENCOUNTER (OUTPATIENT)
Dept: LAB | Age: 78
End: 2023-03-28

## 2023-03-28 LAB
ANION GAP SERPL CALC-SCNC: 4 MMOL/L (ref 5–15)
BUN SERPL-MCNC: 14 MG/DL (ref 6–20)
BUN/CREAT SERPL: 18 (ref 12–20)
CA-I BLD-MCNC: 9.1 MG/DL (ref 8.5–10.1)
CHLORIDE SERPL-SCNC: 105 MMOL/L (ref 97–108)
CO2 SERPL-SCNC: 26 MMOL/L (ref 21–32)
CREAT SERPL-MCNC: 0.79 MG/DL (ref 0.55–1.02)
GLUCOSE SERPL-MCNC: 151 MG/DL (ref 65–100)
POTASSIUM SERPL-SCNC: 4.5 MMOL/L (ref 3.5–5.1)
SODIUM SERPL-SCNC: 135 MMOL/L (ref 136–145)

## 2023-03-28 PROCEDURE — 80048 BASIC METABOLIC PNL TOTAL CA: CPT

## 2023-03-28 PROCEDURE — 36415 COLL VENOUS BLD VENIPUNCTURE: CPT

## 2023-03-31 ENCOUNTER — TELEPHONE (OUTPATIENT)
Dept: INTERNAL MEDICINE CLINIC | Age: 78
End: 2023-03-31

## 2023-03-31 NOTE — TELEPHONE ENCOUNTER
Home Health Requesting speech therapy 2xs weeks for a few weeks. Is ok to give verbal order?      [id number]

## 2023-04-01 ENCOUNTER — HOSPITAL ENCOUNTER (EMERGENCY)
Age: 78
End: 2023-04-01
Attending: EMERGENCY MEDICINE
Payer: MEDICARE

## 2023-04-01 ENCOUNTER — APPOINTMENT (OUTPATIENT)
Dept: CT IMAGING | Age: 78
End: 2023-04-01
Attending: EMERGENCY MEDICINE
Payer: MEDICARE

## 2023-04-01 VITALS
OXYGEN SATURATION: 98 % | BODY MASS INDEX: 30.31 KG/M2 | SYSTOLIC BLOOD PRESSURE: 136 MMHG | WEIGHT: 200 LBS | HEART RATE: 102 BPM | RESPIRATION RATE: 14 BRPM | DIASTOLIC BLOOD PRESSURE: 84 MMHG | HEIGHT: 68 IN | TEMPERATURE: 98.1 F

## 2023-04-01 DIAGNOSIS — N30.01 ACUTE CYSTITIS WITH HEMATURIA: Primary | ICD-10-CM

## 2023-04-01 DIAGNOSIS — E27.8 ADRENAL NODULE (HCC): ICD-10-CM

## 2023-04-01 DIAGNOSIS — R73.9 HYPERGLYCEMIA: ICD-10-CM

## 2023-04-01 DIAGNOSIS — K13.79 ORAL BLEEDING: ICD-10-CM

## 2023-04-01 DIAGNOSIS — E86.0 MODERATE DEHYDRATION: ICD-10-CM

## 2023-04-01 DIAGNOSIS — K80.20 CALCULUS OF GALLBLADDER WITHOUT CHOLECYSTITIS WITHOUT OBSTRUCTION: ICD-10-CM

## 2023-04-01 DIAGNOSIS — B37.49 CANDIDURIA: ICD-10-CM

## 2023-04-01 LAB
ABO + RH BLD: NORMAL
ALBUMIN SERPL-MCNC: 2.6 G/DL (ref 3.5–5)
ALBUMIN/GLOB SERPL: 0.6 (ref 1.1–2.2)
ALP SERPL-CCNC: 105 U/L (ref 45–117)
ALT SERPL-CCNC: 12 U/L (ref 12–78)
ANION GAP SERPL CALC-SCNC: 5 MMOL/L (ref 5–15)
APPEARANCE UR: ABNORMAL
AST SERPL W P-5'-P-CCNC: 10 U/L (ref 15–37)
BACTERIA URNS QL MICRO: NEGATIVE /HPF
BASOPHILS # BLD: 0 K/UL (ref 0–0.1)
BASOPHILS NFR BLD: 0 % (ref 0–1)
BILIRUB SERPL-MCNC: 0.4 MG/DL (ref 0.2–1)
BILIRUB UR QL: NEGATIVE
BLOOD GROUP ANTIBODIES SERPL: NEGATIVE
BUN SERPL-MCNC: 24 MG/DL (ref 6–20)
BUN/CREAT SERPL: 23 (ref 12–20)
CA-I BLD-MCNC: 8.5 MG/DL (ref 8.5–10.1)
CHLORIDE SERPL-SCNC: 104 MMOL/L (ref 97–108)
CO2 SERPL-SCNC: 24 MMOL/L (ref 21–32)
COLOR UR: ABNORMAL
CREAT SERPL-MCNC: 1.05 MG/DL (ref 0.55–1.02)
DIFFERENTIAL METHOD BLD: ABNORMAL
EOSINOPHIL # BLD: 0.1 K/UL (ref 0–0.4)
EOSINOPHIL NFR BLD: 1 % (ref 0–7)
EPITH CASTS URNS QL MICRO: ABNORMAL /LPF
ERYTHROCYTE [DISTWIDTH] IN BLOOD BY AUTOMATED COUNT: 12.7 % (ref 11.5–14.5)
GLOBULIN SER CALC-MCNC: 4.3 G/DL (ref 2–4)
GLUCOSE BLD STRIP.AUTO-MCNC: 335 MG/DL (ref 65–100)
GLUCOSE BLD STRIP.AUTO-MCNC: 335 MG/DL (ref 65–100)
GLUCOSE SERPL-MCNC: 401 MG/DL (ref 65–100)
GLUCOSE UR STRIP.AUTO-MCNC: >300 MG/DL
HCT VFR BLD AUTO: 31 % (ref 35–47)
HGB BLD-MCNC: 10.1 G/DL (ref 11.5–16)
HGB UR QL STRIP: ABNORMAL
IMM GRANULOCYTES # BLD AUTO: 0 K/UL (ref 0–0.04)
IMM GRANULOCYTES NFR BLD AUTO: 1 % (ref 0–0.5)
KETONES UR QL STRIP.AUTO: 5 MG/DL
LACTATE SERPL-SCNC: 1 MMOL/L (ref 0.4–2)
LEUKOCYTE ESTERASE UR QL STRIP.AUTO: ABNORMAL
LYMPHOCYTES # BLD: 0.7 K/UL (ref 0.8–3.5)
LYMPHOCYTES NFR BLD: 10 % (ref 12–49)
MAGNESIUM SERPL-MCNC: 1.7 MG/DL (ref 1.6–2.4)
MCH RBC QN AUTO: 31.3 PG (ref 26–34)
MCHC RBC AUTO-ENTMCNC: 32.6 G/DL (ref 30–36.5)
MCV RBC AUTO: 96 FL (ref 80–99)
MONOCYTES # BLD: 0.7 K/UL (ref 0–1)
MONOCYTES NFR BLD: 9 % (ref 5–13)
NEUTS SEG # BLD: 6 K/UL (ref 1.8–8)
NEUTS SEG NFR BLD: 79 % (ref 32–75)
NITRITE UR QL STRIP.AUTO: NEGATIVE
NRBC # BLD: 0 K/UL (ref 0–0.01)
NRBC BLD-RTO: 0 PER 100 WBC
PERFORMED BY, TECHID: ABNORMAL
PERFORMED BY, TECHID: ABNORMAL
PH UR STRIP: 5 (ref 5–8)
PLATELET # BLD AUTO: 336 K/UL (ref 150–400)
PMV BLD AUTO: 10.3 FL (ref 8.9–12.9)
POTASSIUM SERPL-SCNC: 4.6 MMOL/L (ref 3.5–5.1)
PROT SERPL-MCNC: 6.9 G/DL (ref 6.4–8.2)
PROT UR STRIP-MCNC: 100 MG/DL
RBC # BLD AUTO: 3.23 M/UL (ref 3.8–5.2)
RBC #/AREA URNS HPF: >100 /HPF (ref 0–5)
SODIUM SERPL-SCNC: 133 MMOL/L (ref 136–145)
SP GR UR REFRACTOMETRY: >1.03 (ref 1–1.03)
SPECIMEN EXP DATE BLD: NORMAL
TROPONIN I SERPL HS-MCNC: 7 NG/L (ref 0–51)
UA: UC IF INDICATED,UAUC: ABNORMAL
UROBILINOGEN UR QL STRIP.AUTO: 0.1 EU/DL (ref 0.1–1)
WBC # BLD AUTO: 7.5 K/UL (ref 3.6–11)
WBC CASTS URNS QL MICRO: PRESENT
WBC URNS QL MICRO: >100 /HPF (ref 0–4)
YEAST URNS QL MICRO: PRESENT

## 2023-04-01 PROCEDURE — 99285 EMERGENCY DEPT VISIT HI MDM: CPT

## 2023-04-01 PROCEDURE — 74011000636 HC RX REV CODE- 636: Performed by: EMERGENCY MEDICINE

## 2023-04-01 PROCEDURE — 82962 GLUCOSE BLOOD TEST: CPT

## 2023-04-01 PROCEDURE — 74011250636 HC RX REV CODE- 250/636: Performed by: EMERGENCY MEDICINE

## 2023-04-01 PROCEDURE — 80053 COMPREHEN METABOLIC PANEL: CPT

## 2023-04-01 PROCEDURE — 87086 URINE CULTURE/COLONY COUNT: CPT

## 2023-04-01 PROCEDURE — 84484 ASSAY OF TROPONIN QUANT: CPT

## 2023-04-01 PROCEDURE — 85025 COMPLETE CBC W/AUTO DIFF WBC: CPT

## 2023-04-01 PROCEDURE — 74011250637 HC RX REV CODE- 250/637: Performed by: EMERGENCY MEDICINE

## 2023-04-01 PROCEDURE — 96374 THER/PROPH/DIAG INJ IV PUSH: CPT

## 2023-04-01 PROCEDURE — 81001 URINALYSIS AUTO W/SCOPE: CPT

## 2023-04-01 PROCEDURE — 74011636637 HC RX REV CODE- 636/637: Performed by: EMERGENCY MEDICINE

## 2023-04-01 PROCEDURE — 74177 CT ABD & PELVIS W/CONTRAST: CPT

## 2023-04-01 PROCEDURE — 83605 ASSAY OF LACTIC ACID: CPT

## 2023-04-01 PROCEDURE — 86900 BLOOD TYPING SEROLOGIC ABO: CPT

## 2023-04-01 PROCEDURE — 36415 COLL VENOUS BLD VENIPUNCTURE: CPT

## 2023-04-01 PROCEDURE — 83735 ASSAY OF MAGNESIUM: CPT

## 2023-04-01 PROCEDURE — 74011000250 HC RX REV CODE- 250: Performed by: EMERGENCY MEDICINE

## 2023-04-01 RX ORDER — CEFDINIR 300 MG/1
300 CAPSULE ORAL 2 TIMES DAILY
Qty: 20 CAPSULE | Refills: 0 | OUTPATIENT
Start: 2023-04-01 | End: 2023-04-11

## 2023-04-01 RX ORDER — CEFDINIR 300 MG/1
300 CAPSULE ORAL 2 TIMES DAILY
Qty: 20 CAPSULE | Refills: 0 | OUTPATIENT
Start: 2023-04-01 | End: 2023-04-01 | Stop reason: SDUPTHER

## 2023-04-01 RX ORDER — FLUCONAZOLE 200 MG/1
200 TABLET ORAL DAILY
Qty: 7 TABLET | Refills: 0 | Status: SHIPPED | OUTPATIENT
Start: 2023-04-01 | End: 2023-04-08

## 2023-04-01 RX ORDER — CEFDINIR 300 MG/1
300 CAPSULE ORAL 2 TIMES DAILY
Qty: 20 CAPSULE | Refills: 0 | Status: SHIPPED | OUTPATIENT
Start: 2023-04-01 | End: 2023-04-01 | Stop reason: SDUPTHER

## 2023-04-01 RX ORDER — FLUCONAZOLE 100 MG/1
200 TABLET ORAL
Status: COMPLETED | OUTPATIENT
Start: 2023-04-01 | End: 2023-04-01

## 2023-04-01 RX ADMIN — IOPAMIDOL 100 ML: 755 INJECTION, SOLUTION INTRAVENOUS at 18:58

## 2023-04-01 RX ADMIN — FLUCONAZOLE 200 MG: 100 TABLET ORAL at 23:00

## 2023-04-01 RX ADMIN — SODIUM CHLORIDE 1000 ML: 9 INJECTION, SOLUTION INTRAVENOUS at 17:45

## 2023-04-01 RX ADMIN — CEFTRIAXONE 2 G: 2 INJECTION, POWDER, FOR SOLUTION INTRAMUSCULAR; INTRAVENOUS at 23:00

## 2023-04-01 RX ADMIN — INSULIN HUMAN 5 UNITS: 100 INJECTION, SOLUTION PARENTERAL at 20:57

## 2023-04-01 NOTE — ED NOTES
Pt presented to the ER with complaint of GI Bleed. Pt coming from [facility]. Staff told EMS that they noted they patient had blood from mouth and was concerned she might have GI bleeding. Staff denied any known N/V. EMS stated staff pt is at baseline mental status. oriented to room and call bell,, cardiac monitoring initiated , spO2 Monitoring initiated , IV  initiated , call bell within reach, side rails up x2, resting comfortable but easily arousable, no signs of acute distress. , bed in lowest position, will continue to monitor      Airway: Patent, Managing oral secretions, and No obstruction    Respiratory: Normal Rate , Normal Depth, No acute Distress, and Speaking in full sentences    Cardiac: WNL    Neuro: AVPU a;ert and A&O1/4    GI: Soft and Non-tender    : WNL    Muscle/Skeletal: WNL

## 2023-04-01 NOTE — ED PROVIDER NOTES
[facility] EMERGENCY DEPT   EMERGENCY DEPARTMENT HISTORY AND PHYSICAL EXAM      Date: 4/1/2023  Patient Name: Dayanna Rdz      History of Presenting Illness     Chief Complaint   Patient presents with    Other     Upper GI bleed       History Provided By: Patient, EMS, Patient's Son, and Nursing Home/SNF/Rehab [address]: by ambulance    Location/Duration/Severity/Modifying factors   Patient is a pleasant 19-year-old female with a history of a recent stroke. Patient presents today with concerns for possible bleeding. Story provided by the son who is at the bedside states that the nursing staff noted some blood on the patient's hands and in her mouth and they were concerned that she had been vomiting blood although there was no witnessed vomiting. She was sent for evaluation for this reason. The son states that he does not believe that she was vomiting and was feeling fine this morning. He notes that she has a lesion in the sublingual area that he thought may have been bleeding and that may be the blood that was visualized by the nursing staff. Patient recently moved over to a rehabilitation facility after she had a recent stroke affecting the right side of her body. He states that her mental status is at baseline although she has some aphasia. There are no other complaints, changes, or physical findings at this time. PCP: Sonja James MD    Current Outpatient Medications   Medication Sig Dispense Refill    fluconazole (DIFLUCAN) 200 mg tablet Take 1 Tablet by mouth daily for 7 days. FDA advises cautious prescribing of oral fluconazole in pregnancy. 7 Tablet 0    cefdinir (OMNICEF) 300 mg capsule Take 1 Capsule by mouth two (2) times a day for 10 days. 20 Capsule 0    clopidogreL (PLAVIX) 75 mg tab Take 1 Tablet by mouth daily. 30 Tablet 0    cyanocobalamin 1,000 mcg tablet Take 1 Tablet by mouth daily.  30 Tablet 0    insulin glargine (LANTUS) 100 unit/mL injection 20 Units by SubCUTAneous route nightly. Hold if BG less than 100 1 mL 0    atorvastatin (LIPITOR) 80 mg tablet Take 1 Tablet by mouth nightly. 30 Tablet 0    insulin lispro (HUMALOG) 100 unit/mL injection As directed above 1 Each 0    aspirin 81 mg chewable tablet Take 1 Tablet by mouth daily. 30 Tablet 0    Janumet 50-1,000 mg per tablet Take 1 Tablet by mouth two (2) times a day. ibandronate (BONIVA) 150 mg tablet       amLODIPine-benazepril (LOTREL) 10-40 mg per capsule Take 1 Capsule by mouth daily. ergocalciferol (ERGOCALCIFEROL) 1,250 mcg (50,000 unit) capsule Take 50,000 Units by mouth every seven (7) days. Past History     Past Medical History:  Past Medical History:   Diagnosis Date    Diabetes ([facility] [address]     Hypercholesterolemia     Hypertension     Stroke [facility])        Past Surgical History:  Past Surgical History:   Procedure Laterality Date    HX BACK SURGERY      HX CARPAL TUNNEL RELEASE      HX CATARACT REMOVAL Bilateral     HX ORTHOPAEDIC      ankle       Family History:  Family History   Problem Relation Age of Onset    Hypertension Mother     Heart Surgery Father     Immune Disorder Sister        Social History:  Social History     Tobacco Use    Smoking status: Never    Smokeless tobacco: Never   Vaping Use    Vaping Use: Never used   Substance Use Topics    Alcohol use: Not Currently    Drug use: Never       Allergies:  No Known Allergies    Medications:  Discharge Medication List as of 4/1/2023 11:05 PM        CONTINUE these medications which have NOT CHANGED    Details   clopidogreL (PLAVIX) 75 mg tab Take 1 Tablet by mouth daily. , No Print, Disp-30 Tablet, R-0      cyanocobalamin 1,000 mcg tablet Take 1 Tablet by mouth daily. , No Print, Disp-30 Tablet, R-0      insulin glargine (LANTUS) 100 unit/mL injection 20 Units by SubCUTAneous route nightly.  Hold if BG less than 100, Normal, Disp-1 mL, R-0      atorvastatin (LIPITOR) 80 mg tablet Take 1 Tablet by mouth nightly., No Print, Disp-30 Tablet, R-0      insulin lispro (HUMALOG) 100 unit/mL injection As directed above, No Print, Disp-1 Each, R-0      aspirin 81 mg chewable tablet Take 1 Tablet by mouth daily. , No Print, Disp-30 Tablet, R-0      Janumet 50-1,000 mg per tablet Take 1 Tablet by mouth two (2) times a day., Historical Med, MECHE      ibandronate (BONIVA) 150 mg tablet Historical Med      amLODIPine-benazepril (LOTREL) 10-40 mg per capsule Take 1 Capsule by mouth daily. , Historical Med      ergocalciferol (ERGOCALCIFEROL) 1,250 mcg (50,000 unit) capsule Take 50,000 Units by mouth every seven (7) days. , Historical Med             Social Determinants of Health:  Social Determinants of Health     Tobacco Use: Low Risk     Smoking Tobacco Use: Never    Smokeless Tobacco Use: Never    Passive Exposure: Not on file   Alcohol Use: Not on file   Financial Resource Strain: Not on file   Food Insecurity: Not on file   Transportation Needs: Not on file   Physical Activity: Not on file   Stress: Not on file   Social Connections: Not on file   Intimate Partner Violence: Not on file   Depression: Not at risk    PHQ-2 Score: 0   Housing Stability: Not on file     Good access to primary and/or specialist care. Reports generally stable financial, home and living environment. Physical Exam     ED Triage Vitals [04/01/23 1553]   ED Encounter Vitals Group      /72      Pulse (Heart Rate) (!) 122      Resp Rate 18      Temp 98.1 °F (36.7 °C)      Temp src       O2 Sat (%) 99 %      Weight 200 lb      Height 5' 8\"      Physical Exam  Vitals and nursing note reviewed. Constitutional:       General: She is not in acute distress. Appearance: Normal appearance. She is not ill-appearing or toxic-appearing. Comments: Alert, mildly obese. Nontoxic well-appearing   HENT:      Head: Normocephalic and atraumatic. Right Ear: External ear normal.      Left Ear: External ear normal.      Nose: Nose normal. No congestion.       Mouth/Throat: Mouth: Mucous membranes are moist.      Pharynx: Oropharynx is clear. Comments: In the sublingual area there is an appearance of a slight abrasion with some appearance of some dried blood underneath the tongue. There is some white exudate appearing substance just adjacent to this on the left side. .  Eyes:      Conjunctiva/sclera: Conjunctivae normal.   Cardiovascular:      Rate and Rhythm: Normal rate and regular rhythm. Pulses: Normal pulses. Heart sounds: Normal heart sounds. No murmur heard. Pulmonary:      Effort: Pulmonary effort is normal.      Breath sounds: Normal breath sounds. No wheezing, rhonchi or rales. Abdominal:      General: Abdomen is flat. Tenderness: There is no abdominal tenderness. There is no guarding or rebound. Musculoskeletal:         General: No swelling or tenderness. Normal range of motion. Cervical back: Normal range of motion and neck supple. No rigidity or tenderness. Right lower leg: No edema. Left lower leg: No edema. Skin:     General: Skin is warm and dry. Capillary Refill: Capillary refill takes less than 2 seconds. Findings: No rash. Neurological:      General: No focal deficit present. Mental Status: She is alert. Cranial Nerves: No cranial nerve deficit. Sensory: No sensory deficit. Motor: No weakness.        Lab and Diagnostic Study Results     Labs -  Recent Results (from the past 24 hour(s))   CBC WITH AUTOMATED DIFF    Collection Time: 04/01/23  4:29 PM   Result Value Ref Range    WBC 7.5 3.6 - 11.0 K/uL    RBC 3.23 (L) 3.80 - 5.20 M/uL    HGB 10.1 (L) 11.5 - 16.0 g/dL    HCT 31.0 (L) 35.0 - 47.0 %    MCV 96.0 80.0 - 99.0 FL    MCH 31.3 26.0 - 34.0 PG    MCHC 32.6 30.0 - 36.5 g/dL    RDW 12.7 11.5 - 14.5 %    PLATELET 726 587 - 261 K/uL    MPV 10.3 8.9 - 12.9 FL    NRBC 0.0 0.0  WBC    ABSOLUTE NRBC 0.00 0.00 - 0.01 K/uL    NEUTROPHILS 79 (H) 32 - 75 %    LYMPHOCYTES 10 (L) 12 - 49 % MONOCYTES 9 5 - 13 %    EOSINOPHILS 1 0 - 7 %    BASOPHILS 0 0 - 1 %    IMMATURE GRANULOCYTES 1 (H) 0 - 0.5 %    ABS. NEUTROPHILS 6.0 1.8 - 8.0 K/UL    ABS. LYMPHOCYTES 0.7 (L) 0.8 - 3.5 K/UL    ABS. MONOCYTES 0.7 0.0 - 1.0 K/UL    ABS. EOSINOPHILS 0.1 0.0 - 0.4 K/UL    ABS. BASOPHILS 0.0 0.0 - 0.1 K/UL    ABS. IMM. GRANS. 0.0 0.00 - 0.04 K/UL    DF AUTOMATED     METABOLIC PANEL, COMPREHENSIVE    Collection Time: 04/01/23  4:29 PM   Result Value Ref Range    Sodium 133 (L) 136 - 145 mmol/L    Potassium 4.6 3.5 - 5.1 mmol/L    Chloride 104 97 - 108 mmol/L    CO2 24 21 - 32 mmol/L    Anion gap 5 5 - 15 mmol/L    Glucose 401 (H) 65 - 100 mg/dL    BUN 24 (H) 6 - 20 mg/dL    Creatinine 1.05 (H) 0.55 - 1.02 mg/dL    BUN/Creatinine ratio 23 (H) 12 - 20      eGFR 55 (L) >60 ml/min/1.73m2    Calcium 8.5 8.5 - 10.1 mg/dL    Bilirubin, total 0.4 0.2 - 1.0 mg/dL    AST (SGOT) 10 (L) 15 - 37 U/L    ALT (SGPT) 12 12 - 78 U/L    Alk.  phosphatase 105 45 - 117 U/L    Protein, total 6.9 6.4 - 8.2 g/dL    Albumin 2.6 (L) 3.5 - 5.0 g/dL    Globulin 4.3 (H) 2.0 - 4.0 g/dL    A-G Ratio 0.6 (L) 1.1 - 2.2     TYPE & SCREEN    Collection Time: 04/01/23  4:29 PM   Result Value Ref Range    Crossmatch Expiration 04/04/2023,2359     ABO/Rh(D) A Positive     Antibody screen Negative    LACTIC ACID    Collection Time: 04/01/23  5:51 PM   Result Value Ref Range    Lactic acid 1.0 0.4 - 2.0 mmol/L   TROPONIN-HIGH SENSITIVITY    Collection Time: 04/01/23  5:51 PM   Result Value Ref Range    Troponin-High Sensitivity 7 0 - 51 ng/L   MAGNESIUM    Collection Time: 04/01/23  5:51 PM   Result Value Ref Range    Magnesium 1.7 1.6 - 2.4 mg/dL   GLUCOSE, POC    Collection Time: 04/01/23  8:28 PM   Result Value Ref Range    Glucose (POC) 335 (H) 65 - 100 mg/dL    Performed by [address] [place] [address], POC    Collection Time: 04/01/23  9:29 PM   Result Value Ref Range    Glucose (POC) 335 (H) 65 - 100 mg/dL    Performed by Jesica Jones W/ REFLEX CULTURE    Collection Time: 04/01/23  9:31 PM    Specimen: Urine   Result Value Ref Range    Color Yellow/Straw      Appearance Turbid (A) Clear      Specific gravity >1.030 (H) 1.003 - 1.030    pH (UA) 5.0 5.0 - 8.0      Protein 100 (A) Negative mg/dL    Glucose >300 (A) Negative mg/dL    Ketone 5 (A) Negative mg/dL    Bilirubin Negative Negative      Blood Small (A) Negative      Urobilinogen 0.1 0.1 - 1.0 EU/dL    Nitrites Negative Negative      Leukocyte Esterase Moderate (A) Negative      Epithelial cells Few Few /lpf    UA:UC IF INDICATED Urine Culture Ordered (A) Culture not indicated by UA result      WBC >100 (H) 0 - 4 /hpf    RBC >100 (H) 0 - 5 /hpf    Bacteria Negative Negative /hpf    PRESUMPTIVE YEAST Present      Budding yeast Present (A) Negative           Radiologic Studies -   CT ABD PELV W CONT   Final Result   1. Large 3.1 cm dependent gallstone without CT evidence of acute cholecystitis. 2.   Steatosis. 3.  2.9 cm left adrenal nodule. Correlate with nonemergent adrenal mass protocol   CT. 4.  Severe diverticulosis. 5.  Other findings as above. Non-plain film images such as CT, Ultrasound and MRI are read by the radiologist. Plain radiographic images are visualized and preliminarily interpreted by the ED Provider with the below findings:    Please see the full medical record for comprehensive list of labs and imaging obtained during the visit. All labs and imaging studies were personally reviewed.     My intepretation(s) if applicable are below:     EKG interpretation(s): See ED Course for interpretation of my EKG(s)    Xray Interpretations(s): See ED Course Section for my interpretation of Xray(s)    Cardiac Monitor:  Cardiac Monitor Interpretation:     Rate: 100-110 BPM,   Rhythm: Sinus Tachycardia    Pulse Oximetry Interpretation: 98% on Room Air      Medical Decision Making and ED Course   - I am the first and primary provider for this patient AND AM THE PRIMARY PROVIDER OF RECORD. - I reviewed the vital signs, available nursing notes, past medical history, past surgical history, family history and social history. - Initial assessment performed. The patients presenting problems have been discussed, and the staff are in agreement with the care plan formulated and outlined with them. I have encouraged them to ask questions as they arise throughout their visit. Vital Signs-Reviewed the patient's vital signs. Patient was given the following medications:  Medications   sodium chloride 0.9 % bolus infusion 1,000 mL (1,000 mL IntraVENous New Bag 4/1/23 1745)   iopamidoL (ISOVUE-370) 370 mg iodine /mL (76 %) injection 100 mL (100 mL IntraVENous Given 4/1/23 1858)   insulin regular (NOVOLIN R, HUMULIN R) injection 5 Units (5 Units SubCUTAneous Given 4/1/23 2057)   cefTRIAXone (ROCEPHIN) 2 g in sterile water (preservative free) 20 mL IV syringe (2 g IntraVENous Given 4/1/23 2300)   fluconazole (DIFLUCAN) tablet 200 mg (200 mg Oral Given 4/1/23 2300)       Patient Vitals for the past 24 hrs:   Temp Pulse Resp BP SpO2   04/01/23 2151 -- (!) 102 14 -- 98 %   04/01/23 2147 -- (!) 102 15 -- 98 %   04/01/23 1700 -- (!) 108 17 136/84 98 %   04/01/23 1553 98.1 °F (36.7 °C) (!) 122 18 111/72 99 %       Social Determinants affecting Dx or Tx:  Lives in a nursing home and has history of aphasia    Records Reviewed: Prior medical records, Previous Radiology studies, Previous Laboratory studies, Previous EKGs, and Nursing notes    Additional Considerations:    None  Admission considered, I engaged the patient in shared decision making and they prefer to follow up with their PCP/Specialist instead. Although patient does not appear to be any sort of gastrointestinal hemorrhage, I offered the patient admission given a UTI and mild tachycardia. Patient adamantly wants to be discharged. Encouraged her to have a low threshold to return or seek medical care.   Son at bedside advised as well.  Provider Notes (Medical Decision Making):     MDM  Number of Diagnoses or Management Options  Acute cystitis with hematuria  Adrenal nodule ([facility] [address]  Calculus of gallbladder without cholecystitis without obstruction  Candiduria  Hyperglycemia  Moderate dehydration  Oral bleeding  Diagnosis management comments: Patient is a 74-year-old female with history of recent stroke with aphasia presenting to the emergency room with a chief complaint of now resolved oral bleeding. Based on the nursing home recollection in the sun she began having noted some bleeding from her mouth and on her hands which started within the last hour or 2. Son states he saw her at the nursing home and she was not having any problems. States she is at her baseline with her aphasia seems to be responsive normally. Sometimes she gives incorrect answers states basically due to aphasia not due to cognitive impairment. Patient initially tachycardic, this improves slightly with fluids and the son states that they were advised recently that she has history of this. Hemoglobin slightly below baseline but not significantly anemic. Her lab work-up reflects some degree of dehydration, she has an elevated BUN which looks to be somewhat chronic for her. This looks improved compared to previous measurements. DDx: Oral bleeding, GI bleeding, dehydration, UTI, candiduria, sepsis, etc.    Patient denies any symptoms of chest pain or shortness of breath. She is afebrile. Her work-up is as above with generally reassuring labs normal white blood cell count, negative lactic acid. See ED course section below in summary, patient eager to be discharged. We will start her on antibiotics for UTI. There is low likelihood of GI bleeding although patient did refuse a rectal exam for further assessment.   Encouraged to have a low threshold to return if she experiences worsening or condition of the meantime advised of the incidental findings of the gallbladder stone and the patient has no pain so that is likely noncontributory to her presentation as well as the adrenal nodule for which they recommended follow-up. ED Course:         ED Course as of 04/02/23 1324   Sat Apr 01, 2023   1913 Labs reflect some dehydration with elevated BUN to creatinine ratio. BUN elevated but less than previous and looks to be chronic. Glucose elevated at 401, LFTs normal.  Normal CBC. No sign of sepsis. [ADRIANNA]   2024 Patient reassessed. Her work-up generally reassuring with hemoglobin essentially unchanged from previous. She still slightly tachycardic however she says that that is a chronic issue for her. On review of prior encounter she has had some tachycardia in the past that is worse than usual for her. CT showed a gallstone although incidental and not likely affecting her care today. Discussed this with her. She denies any pain in that area. I also discussed with the patient doing a rectal exam but she is declining at this time. She is coherent although has some aphasia her son says that she is at baseline and has no memory impairment. I rechecked her blood sugar so that we can address this calm down. [ADRIANNA]   2027 I suspect the bleeding that was described was due to an intraoral lesion that the patient has in the sublingual area looks like was recently bleeding. [ADRIANNA]   2028 Patient continues to deny any chest pain or shortness of breath or any complaints whatsoever, is requesting to go home. [ADRIANNA]   2034 Blood sugar rechecked 335, improving we will give a little bit of insulin. [ADRIANNA]   2132 Patient went to have a bowel movement however no stool produced. Patient's nurse states that the urine was very very cloudy so we will send that to be assessed. [ADRIANNA]   4937 Waiting for UA. [ADRIANNA]   2217 Patient was reassessed. Urine shows obvious infection. It is very concentrated as well likely reflecting dehydration. I will start the patient on Rocephin here.   I suspect this is very unlikely a GI bleed, likely localized bleeding in the mouth. The patient has no interest in staying any further in the emergency room wants to be discharged. I discussed the incidental findings with the patient and the son recommended follow-up with primary care for further assessment and surgery regarding the gallbladder. Advised to have a low threshold to return if there is any worsening. Patient remains slightly tachycardic although it is improved after fluids. She is adamant that she wants to leave continues to decline rectal exam.  She has decision made capacity and son states that that she is at her baseline and although she has aphasia her decision made capacity has not yet been impaired from the stroke. [ADRIANNA]      ED Course User Index  [ADRIANNA] Sara Alcantara DO     This appears to be an acute condition.  ------------------------------------------------------------------------------------------------------------    Smoking Cessation: N/A    Consultations:       Consultations: - NONE    See the ED course section, if applicable for details on the content of consultations requested. Procedures and Critical Care       Performed by: Brian Saenz DO    Procedures             CRITICAL CARE NOTE :  5:28 PM  CRITICAL CARE TIME: none    Brian Saenz DO        Disposition     DISPOSITION: Discharged to [facility]        Diagnosis:   1. Acute cystitis with hematuria    2. Calculus of gallbladder without cholecystitis without obstruction    3. Oral bleeding    4. Adrenal nodule ([address]    5. Candiduria    6. Hyperglycemia    7.  Moderate dehydration        Follow-up Information       Follow up With Specialties Details Why Contact Info    Amol Holm MD Internal Medicine Physician Call today  [address] [phone]      [facility] EMERGENCY DEPT Emergency Medicine  As needed, If symptoms worsen; or Nearest Emergency Department [address] [place] [id number] [address]  [phone]            Discharge Medication List as of 4/1/2023 11:05 PM        START taking these medications    Details   fluconazole (DIFLUCAN) 200 mg tablet Take 1 Tablet by mouth daily for 7 days. FDA advises cautious prescribing of oral fluconazole in pregnancy. , Normal, Disp-7 Tablet, R-0      cefdinir (OMNICEF) 300 mg capsule Take 1 Capsule by mouth two (2) times a day for 10 days. , Normal, Disp-20 Capsule, R-0           CONTINUE these medications which have NOT CHANGED    Details   clopidogreL (PLAVIX) 75 mg tab Take 1 Tablet by mouth daily. , No Print, Disp-30 Tablet, R-0      cyanocobalamin 1,000 mcg tablet Take 1 Tablet by mouth daily. , No Print, Disp-30 Tablet, R-0      insulin glargine (LANTUS) 100 unit/mL injection 20 Units by SubCUTAneous route nightly. Hold if BG less than 100, Normal, Disp-1 mL, R-0      atorvastatin (LIPITOR) 80 mg tablet Take 1 Tablet by mouth nightly., No Print, Disp-30 Tablet, R-0      insulin lispro (HUMALOG) 100 unit/mL injection As directed above, No Print, Disp-1 Each, R-0      aspirin 81 mg chewable tablet Take 1 Tablet by mouth daily. , No Print, Disp-30 Tablet, R-0      Janumet 50-1,000 mg per tablet Take 1 Tablet by mouth two (2) times a day., Historical Med, MECHE      ibandronate (BONIVA) 150 mg tablet Historical Med      amLODIPine-benazepril (LOTREL) 10-40 mg per capsule Take 1 Capsule by mouth daily. , Historical Med      ergocalciferol (ERGOCALCIFEROL) 1,250 mcg (50,000 unit) capsule Take 50,000 Units by mouth every seven (7) days. , Historical Med                       Diagnosis     Clinical Impression:   1. Acute cystitis with hematuria    2. Calculus of gallbladder without cholecystitis without obstruction    3. Oral bleeding    4. Adrenal nodule ([address]    5. Candiduria    6. Hyperglycemia    7.  Moderate dehydration        Attestations:    Brandon Cabezas, DO    Please note that this dictation was completed with [employer], the [employer] voice recognition software. Quite often unanticipated grammatical, syntax, homophones, and other interpretive errors are inadvertently transcribed by the computer software. Please disregard these errors. Please excuse any errors that have escaped final proofreading. Thank you.

## 2023-04-01 NOTE — ED NOTES
Care assumed and bedside SBAR report endorsed on Eliu. Pt cardiac monitoring continued , spO2 Monitoring continued, IV reassed, call bell within reach, side rails up x2, resting comfortable but easily arousable, no signs of acute distress. , bed in lowest position, MAR reviewed, Labs reviewed, will continue to [employer]

## 2023-04-02 NOTE — DISCHARGE INSTRUCTIONS
Please review the instructions below for important details on your care:  1. Start taking the antibiotics. If the physician at the facility prefers a different antibiotic that is acceptable however I have prescribed cefdinir for treatment of UTI. I have also prescribed fluconazole which is treatment for the yeast that they found in your urine as well. I have prescribed 1 week however it may need to be extended and the urine should be rechecked later in the week. 2.  The gallbladder has a gallstone in it. If you develop abdominal pain you should seek medical attention. You should follow-up with your primary care doctor or facility physician for further assessment. 3.  Your CAT scan showed an incidental finding of an adrenal nodule. This should be followed up by your primary care doctor as well. 4.  Have a low threshold to return if you develop high fever, vomiting blood or bloody stools, changes in mental status or any worsening in your condition in the meantime. Thank you for allowing us to provide you with excellent care today. We hope we addressed all of your concerns and needs. We strive to provide excellent quality care in the Emergency Department. Anything less than excellent does not meet our expectations for you. Incidental findings are findings on labs or imaging studies that do not necessarily correlate with the reason for your visit. We make every effort to inform you of these incidental findings and the proper follow-up, however it is important that you call your primary care doctor or a primary care doctor in 1 to 2 days to set up a follow-up visit so they can go through your results in detail with you, and determine if additional testing is needed. The emergency room is not intended to be complete or comprehensive care, and it serves as a means to rule out life-threatening pathologies.   It is very important that you follow-up with your primary care doctor to arrange additional testing and/or treatment if needed. The exam and treatment you received in the Emergency Department were for an urgent problem and are not intended as complete care. It is important that you follow-up with a doctor, nurse practitioner, or physician assistant to:  (1) confirm your diagnosis,  (2) re-evaluation of changes in your illness and treatment, and  (3) for ongoing care. If your symptoms become worse or you do not improve as expected, or you develop any new symptoms that concern you and/or you are unable to reach your usual health care provider, you should return to the Emergency Department. We are available 24 hours a day. If your blood pressure is greater than 120/80, your blood pressure is considered elevated above normal and you need to follow up with your primary care physician or the physician provided on your discharge instructions for a blood pressure recheck. If you were prescribed narcotic medications such as hydrocodone, oxycodone, diazepam, lorazepam, alprazolam, tramadol or codeine, these medications will NOT typically be refilled in the Emergency Room. Follow up with your primary care doctor or specialist to discuss this, or you may return to the Emergency room if your condition worsens. CT ABD PELV W CONT   Final Result   1. Large 3.1 cm dependent gallstone without CT evidence of acute cholecystitis. 2.   Steatosis. 3.  2.9 cm left adrenal nodule. Correlate with nonemergent adrenal mass protocol   CT. 4.  Severe diverticulosis. 5.  Other findings as above. Diagnosis:   1. Acute cystitis with hematuria    2. Calculus of gallbladder without cholecystitis without obstruction    3. Oral bleeding    4. Adrenal nodule ([address]    5. Candiduria    6. Hyperglycemia    7. Moderate dehydration          Take this sheet with you when you go to your follow-up visit.    If you have any problem arranging the follow-up visit, contact [phone])    Make an appointment with your Primary Care doctor for follow up of this visit. Return to the ER if you are unable to be seen in the time recommended on your discharge instructions. It has been a pleasure caring for you today. Return to the ER or seek medical care for any worsening in your condition at any time. [employer]    Thank you for requesting access to [employer]. Please follow the instructions below to securely access and download your online medical record. [employer] allows you to send messages to your doctor, view your test results, renew your prescriptions, schedule appointments, and more. How Do I Sign Up? In your internet browser, go to [email or web address]  Click on the First Time User? Click Here link in the Sign In box. You will be redirect to the New Member Sign Up page. Enter your BlooBox Access Code exactly as it appears below. You will not need to use this code after youve completed the sign-up process. If you do not sign up before the expiration date, you must request a new code. BlooBox Access Code: [email or web address]  Expires: 2023 11:54 AM (This is the date your BlooBox access code will )    Enter the last four digits of your Social Security Number (xxxx) and Date of Birth (mm/dd/yyyy) as indicated and click Submit. You will be taken to the next sign-up page. Create a BlooBox ID. This will be your BlooBox login ID and cannot be changed, so think of one that is secure and easy to remember. Create a BlooBox password. You can change your password at any time. Enter your Password Reset Question and Answer. This can be used at a later time if you forget your password. Enter your e-mail address. You will receive e-mail notification when new information is available in [address] Click Sign Up. You can now view and download portions of your medical record. Click the [place] link to download a portable copy of your medical information.     Additional Information    If you have questions, please visit the Frequently Asked Questions section of the [employer] website at [email or web address] Remember, [employer] is NOT to be used for urgent needs. For medical emergencies, dial 911.

## 2023-04-02 NOTE — ED NOTES
Called and gave report to John ROUSSEAU at the [employer]. Assisted pt into wheelchair and then into son's car for transport.

## 2023-04-03 LAB
BACTERIA SPEC CULT: NORMAL
COLONY COUNT,CNT: NORMAL
COLONY COUNT,CNT: NORMAL
SPECIAL REQUESTS,SREQ: NORMAL

## 2023-06-19 ENCOUNTER — HOSPITAL ENCOUNTER (INPATIENT)
Facility: HOSPITAL | Age: 78
LOS: 5 days | Discharge: NURSING, REHAB OR LONG-TERM CARE | DRG: 100 | End: 2023-06-24
Attending: EMERGENCY MEDICINE | Admitting: HOSPITALIST
Payer: MEDICARE

## 2023-06-19 ENCOUNTER — APPOINTMENT (OUTPATIENT)
Facility: HOSPITAL | Age: 78
DRG: 100 | End: 2023-06-19
Payer: MEDICARE

## 2023-06-19 DIAGNOSIS — R56.9 SEIZURE (HCC): Primary | ICD-10-CM

## 2023-06-19 DIAGNOSIS — R56.9 POST-ICTAL STATE (HCC): ICD-10-CM

## 2023-06-19 PROBLEM — R41.82 ALTERED MENTAL STATUS: Status: ACTIVE | Noted: 2023-06-19

## 2023-06-19 LAB
ALBUMIN SERPL-MCNC: 3.5 G/DL (ref 3.5–5)
ALBUMIN/GLOB SERPL: 1.1 (ref 1.1–2.2)
ALP SERPL-CCNC: 73 U/L (ref 45–117)
ALT SERPL-CCNC: 15 U/L (ref 12–78)
ANION GAP SERPL CALC-SCNC: 18 MMOL/L (ref 5–15)
AST SERPL W P-5'-P-CCNC: 13 U/L (ref 15–37)
BASE DEFICIT BLD-SCNC: 9.3 MMOL/L
BASOPHILS # BLD: 0.1 K/UL (ref 0–0.1)
BASOPHILS NFR BLD: 0 % (ref 0–1)
BILIRUB SERPL-MCNC: 0.3 MG/DL (ref 0.2–1)
BUN SERPL-MCNC: 27 MG/DL (ref 6–20)
BUN/CREAT SERPL: 24 (ref 12–20)
CA-I BLD-MCNC: 9 MG/DL (ref 8.5–10.1)
CHLORIDE SERPL-SCNC: 109 MMOL/L (ref 97–108)
CO2 SERPL-SCNC: 17 MMOL/L (ref 21–32)
CREAT SERPL-MCNC: 1.13 MG/DL (ref 0.55–1.02)
DIFFERENTIAL METHOD BLD: ABNORMAL
EOSINOPHIL # BLD: 0.2 K/UL (ref 0–0.4)
EOSINOPHIL NFR BLD: 1 % (ref 0–7)
ERYTHROCYTE [DISTWIDTH] IN BLOOD BY AUTOMATED COUNT: 13.6 % (ref 11.5–14.5)
GLOBULIN SER CALC-MCNC: 3.2 G/DL (ref 2–4)
GLUCOSE SERPL-MCNC: 257 MG/DL (ref 65–100)
HCO3 BLD-SCNC: 16.8 MMOL/L (ref 19–28)
HCT VFR BLD AUTO: 36.9 % (ref 35–47)
HGB BLD-MCNC: 10.8 G/DL (ref 11.5–16)
IMM GRANULOCYTES # BLD AUTO: 0.2 K/UL (ref 0–0.04)
IMM GRANULOCYTES NFR BLD AUTO: 1 % (ref 0–0.5)
LACTATE SERPL-SCNC: 3.7 MMOL/L (ref 0.4–2)
LYMPHOCYTES # BLD: 2.8 K/UL (ref 0.8–3.5)
LYMPHOCYTES NFR BLD: 17 % (ref 12–49)
MAGNESIUM SERPL-MCNC: 1.4 MG/DL (ref 1.6–2.4)
MCH RBC QN AUTO: 28.1 PG (ref 26–34)
MCHC RBC AUTO-ENTMCNC: 29.3 G/DL (ref 30–36.5)
MCV RBC AUTO: 96.1 FL (ref 80–99)
MONOCYTES # BLD: 1.1 K/UL (ref 0–1)
MONOCYTES NFR BLD: 7 % (ref 5–13)
NEUTS SEG # BLD: 12 K/UL (ref 1.8–8)
NEUTS SEG NFR BLD: 74 % (ref 32–75)
NRBC # BLD: 0 K/UL (ref 0–0.01)
NRBC BLD-RTO: 0 PER 100 WBC
PCO2 BLD: 36.4 MMHG (ref 35–45)
PERFORMED BY:: ABNORMAL
PH BLD: 7.27 (ref 7.35–7.45)
PLATELET # BLD AUTO: 480 K/UL (ref 150–400)
PMV BLD AUTO: 10.7 FL (ref 8.9–12.9)
PO2 BLD: 138 MMHG (ref 75–100)
POTASSIUM SERPL-SCNC: 3.8 MMOL/L (ref 3.5–5.1)
PROT SERPL-MCNC: 6.7 G/DL (ref 6.4–8.2)
RBC # BLD AUTO: 3.84 M/UL (ref 3.8–5.2)
SAO2 % BLD: 98.8 %
SODIUM SERPL-SCNC: 144 MMOL/L (ref 136–145)
SPECIMEN TYPE: ABNORMAL
TROPONIN I SERPL HS-MCNC: 11 NG/L (ref 0–51)
WBC # BLD AUTO: 16.2 K/UL (ref 3.6–11)

## 2023-06-19 PROCEDURE — 96374 THER/PROPH/DIAG INJ IV PUSH: CPT

## 2023-06-19 PROCEDURE — 83735 ASSAY OF MAGNESIUM: CPT

## 2023-06-19 PROCEDURE — 85025 COMPLETE CBC W/AUTO DIFF WBC: CPT

## 2023-06-19 PROCEDURE — 6360000002 HC RX W HCPCS

## 2023-06-19 PROCEDURE — 96375 TX/PRO/DX INJ NEW DRUG ADDON: CPT

## 2023-06-19 PROCEDURE — 82803 BLOOD GASES ANY COMBINATION: CPT

## 2023-06-19 PROCEDURE — 84295 ASSAY OF SERUM SODIUM: CPT

## 2023-06-19 PROCEDURE — 84484 ASSAY OF TROPONIN QUANT: CPT

## 2023-06-19 PROCEDURE — 82330 ASSAY OF CALCIUM: CPT

## 2023-06-19 PROCEDURE — 87150 DNA/RNA AMPLIFIED PROBE: CPT

## 2023-06-19 PROCEDURE — 1100000000 HC RM PRIVATE

## 2023-06-19 PROCEDURE — 70450 CT HEAD/BRAIN W/O DYE: CPT

## 2023-06-19 PROCEDURE — 82947 ASSAY GLUCOSE BLOOD QUANT: CPT

## 2023-06-19 PROCEDURE — 6360000002 HC RX W HCPCS: Performed by: EMERGENCY MEDICINE

## 2023-06-19 PROCEDURE — 36415 COLL VENOUS BLD VENIPUNCTURE: CPT

## 2023-06-19 PROCEDURE — 72125 CT NECK SPINE W/O DYE: CPT

## 2023-06-19 PROCEDURE — 80053 COMPREHEN METABOLIC PANEL: CPT

## 2023-06-19 PROCEDURE — 93005 ELECTROCARDIOGRAM TRACING: CPT | Performed by: EMERGENCY MEDICINE

## 2023-06-19 PROCEDURE — 71045 X-RAY EXAM CHEST 1 VIEW: CPT

## 2023-06-19 PROCEDURE — 83605 ASSAY OF LACTIC ACID: CPT

## 2023-06-19 PROCEDURE — 2580000003 HC RX 258: Performed by: HOSPITALIST

## 2023-06-19 PROCEDURE — 84132 ASSAY OF SERUM POTASSIUM: CPT

## 2023-06-19 PROCEDURE — 99285 EMERGENCY DEPT VISIT HI MDM: CPT

## 2023-06-19 PROCEDURE — 87040 BLOOD CULTURE FOR BACTERIA: CPT

## 2023-06-19 RX ORDER — FERROUS SULFATE 325(65) MG
325 TABLET ORAL
COMMUNITY

## 2023-06-19 RX ORDER — LORAZEPAM 2 MG/ML
INJECTION INTRAMUSCULAR
Status: COMPLETED
Start: 2023-06-19 | End: 2023-06-19

## 2023-06-19 RX ORDER — AMLODIPINE BESYLATE 5 MG/1
5 TABLET ORAL DAILY
COMMUNITY
Start: 2023-06-06

## 2023-06-19 RX ORDER — ATORVASTATIN CALCIUM 40 MG/1
40 TABLET, FILM COATED ORAL DAILY
COMMUNITY
Start: 2023-06-06

## 2023-06-19 RX ORDER — FENOFIBRATE 48 MG/1
48 TABLET, COATED ORAL DAILY
COMMUNITY
Start: 2023-06-06

## 2023-06-19 RX ORDER — MIRTAZAPINE 7.5 MG/1
7.5 TABLET, FILM COATED ORAL
COMMUNITY
Start: 2023-06-06

## 2023-06-19 RX ORDER — INSULIN GLARGINE 100 [IU]/ML
16 INJECTION, SOLUTION SUBCUTANEOUS NIGHTLY
COMMUNITY
Start: 2023-04-25

## 2023-06-19 RX ORDER — METOPROLOL SUCCINATE 50 MG/1
50 TABLET, EXTENDED RELEASE ORAL DAILY
COMMUNITY
Start: 2023-06-06

## 2023-06-19 RX ORDER — GABAPENTIN 100 MG/1
100 CAPSULE ORAL 3 TIMES DAILY
COMMUNITY
Start: 2023-06-06

## 2023-06-19 RX ORDER — LORAZEPAM 2 MG/ML
2 INJECTION INTRAMUSCULAR ONCE
Status: COMPLETED | OUTPATIENT
Start: 2023-06-19 | End: 2023-06-19

## 2023-06-19 RX ORDER — LEVETIRACETAM 500 MG/5ML
1500 INJECTION, SOLUTION, CONCENTRATE INTRAVENOUS
Status: COMPLETED | OUTPATIENT
Start: 2023-06-19 | End: 2023-06-19

## 2023-06-19 RX ORDER — SODIUM CHLORIDE, SODIUM LACTATE, POTASSIUM CHLORIDE, CALCIUM CHLORIDE 600; 310; 30; 20 MG/100ML; MG/100ML; MG/100ML; MG/100ML
INJECTION, SOLUTION INTRAVENOUS CONTINUOUS
Status: DISCONTINUED | OUTPATIENT
Start: 2023-06-19 | End: 2023-06-20

## 2023-06-19 RX ADMIN — SODIUM CHLORIDE, POTASSIUM CHLORIDE, SODIUM LACTATE AND CALCIUM CHLORIDE: 600; 310; 30; 20 INJECTION, SOLUTION INTRAVENOUS at 23:53

## 2023-06-19 RX ADMIN — LORAZEPAM 2 MG: 2 INJECTION INTRAMUSCULAR; INTRAVENOUS at 21:17

## 2023-06-19 RX ADMIN — LEVETIRACETAM 1500 MG: 100 INJECTION, SOLUTION INTRAVENOUS at 22:17

## 2023-06-19 RX ADMIN — LORAZEPAM 2 MG: 2 INJECTION INTRAMUSCULAR at 21:17

## 2023-06-20 LAB
ALBUMIN SERPL-MCNC: 2.9 G/DL (ref 3.5–5)
ALBUMIN/GLOB SERPL: 0.9 (ref 1.1–2.2)
ALP SERPL-CCNC: 61 U/L (ref 45–117)
ALT SERPL-CCNC: 12 U/L (ref 12–78)
ANION GAP SERPL CALC-SCNC: 7 MMOL/L (ref 5–15)
APPEARANCE UR: ABNORMAL
ARTERIAL PATENCY WRIST A: YES
AST SERPL W P-5'-P-CCNC: 10 U/L (ref 15–37)
BACTERIA URNS QL MICRO: NEGATIVE /HPF
BASE EXCESS BLDA CALC-SCNC: 0.1 MMOL/L (ref 0–3)
BASOPHILS # BLD: 0 K/UL (ref 0–0.1)
BASOPHILS NFR BLD: 0 % (ref 0–1)
BDY SITE: ABNORMAL
BILIRUB SERPL-MCNC: 0.2 MG/DL (ref 0.2–1)
BILIRUB UR QL: NEGATIVE
BUN SERPL-MCNC: 21 MG/DL (ref 6–20)
BUN/CREAT SERPL: 25 (ref 12–20)
CA-I BLD-MCNC: 8.5 MG/DL (ref 8.5–10.1)
CHLORIDE SERPL-SCNC: 110 MMOL/L (ref 97–108)
CK SERPL-CCNC: 36 U/L (ref 26–192)
CO2 SERPL-SCNC: 25 MMOL/L (ref 21–32)
COHGB MFR BLD: 0.3 % (ref 1–2)
COLOR UR: ABNORMAL
CREAT SERPL-MCNC: 0.84 MG/DL (ref 0.55–1.02)
DIFFERENTIAL METHOD BLD: ABNORMAL
EOSINOPHIL # BLD: 0 K/UL (ref 0–0.4)
EOSINOPHIL NFR BLD: 0 % (ref 0–7)
EPITH CASTS URNS QL MICRO: ABNORMAL /LPF
ERYTHROCYTE [DISTWIDTH] IN BLOOD BY AUTOMATED COUNT: 13.6 % (ref 11.5–14.5)
FIO2 ON VENT: 21 %
GLOBULIN SER CALC-MCNC: 3.2 G/DL (ref 2–4)
GLUCOSE BLD STRIP.AUTO-MCNC: 121 MG/DL (ref 65–100)
GLUCOSE BLD STRIP.AUTO-MCNC: 131 MG/DL (ref 65–100)
GLUCOSE BLD STRIP.AUTO-MCNC: 154 MG/DL (ref 65–100)
GLUCOSE BLD STRIP.AUTO-MCNC: 223 MG/DL (ref 65–100)
GLUCOSE BLD STRIP.AUTO-MCNC: 313 MG/DL (ref 65–100)
GLUCOSE SERPL-MCNC: 190 MG/DL (ref 65–100)
GLUCOSE UR STRIP.AUTO-MCNC: >300 MG/DL
HCO3 BLDA-SCNC: 24 MMOL/L (ref 22–26)
HCT VFR BLD AUTO: 30.7 % (ref 35–47)
HGB BLD-MCNC: 9.3 G/DL (ref 11.5–16)
HGB UR QL STRIP: ABNORMAL
IMM GRANULOCYTES # BLD AUTO: 0 K/UL (ref 0–0.04)
IMM GRANULOCYTES NFR BLD AUTO: 0 % (ref 0–0.5)
KETONES UR QL STRIP.AUTO: 5 MG/DL
LACTATE SERPL-SCNC: 1.1 MMOL/L (ref 0.4–2)
LEUKOCYTE ESTERASE UR QL STRIP.AUTO: ABNORMAL
LYMPHOCYTES # BLD: 0.7 K/UL (ref 0.8–3.5)
LYMPHOCYTES NFR BLD: 7 % (ref 12–49)
MAGNESIUM SERPL-MCNC: 1.2 MG/DL (ref 1.6–2.4)
MCH RBC QN AUTO: 27.6 PG (ref 26–34)
MCHC RBC AUTO-ENTMCNC: 30.3 G/DL (ref 30–36.5)
MCV RBC AUTO: 91.1 FL (ref 80–99)
METHGB MFR BLD: 0.2 % (ref 0–1.4)
MONOCYTES # BLD: 0.6 K/UL (ref 0–1)
MONOCYTES NFR BLD: 6 % (ref 5–13)
MUCOUS THREADS URNS QL MICRO: ABNORMAL /LPF
NEUTS SEG # BLD: 8.6 K/UL (ref 1.8–8)
NEUTS SEG NFR BLD: 87 % (ref 32–75)
NITRITE UR QL STRIP.AUTO: NEGATIVE
NRBC # BLD: 0 K/UL (ref 0–0.01)
NRBC BLD-RTO: 0 PER 100 WBC
OXYHGB MFR BLD: 94.6 % (ref 95–99)
PCO2 BLDA: 35 MMHG (ref 35–45)
PERFORMED BY:: ABNORMAL
PH BLDA: 7.45 (ref 7.35–7.45)
PH UR STRIP: 5 (ref 5–8)
PHOSPHATE SERPL-MCNC: 2.6 MG/DL (ref 2.6–4.7)
PLATELET # BLD AUTO: 341 K/UL (ref 150–400)
PMV BLD AUTO: 10.6 FL (ref 8.9–12.9)
PO2 BLDA: 80 MMHG (ref 80–100)
POTASSIUM SERPL-SCNC: 3.6 MMOL/L (ref 3.5–5.1)
PROT SERPL-MCNC: 6.1 G/DL (ref 6.4–8.2)
PROT UR STRIP-MCNC: 30 MG/DL
RBC # BLD AUTO: 3.37 M/UL (ref 3.8–5.2)
RBC #/AREA URNS HPF: ABNORMAL /HPF (ref 0–5)
SAO2 % BLD: 95 % (ref 95–99)
SAO2% DEVICE SAO2% SENSOR NAME: ABNORMAL
SODIUM SERPL-SCNC: 142 MMOL/L (ref 136–145)
SP GR UR REFRACTOMETRY: 1.02 (ref 1–1.03)
SPECIMEN SITE: ABNORMAL
TSH SERPL DL<=0.05 MIU/L-ACNC: 0.7 UIU/ML (ref 0.36–3.74)
URINE CULTURE IF INDICATED: ABNORMAL
UROBILINOGEN UR QL STRIP.AUTO: 0.1 EU/DL (ref 0.1–1)
WBC # BLD AUTO: 10 K/UL (ref 3.6–11)
WBC CASTS URNS QL MICRO: PRESENT
WBC URNS QL MICRO: ABNORMAL /HPF (ref 0–4)

## 2023-06-20 PROCEDURE — 1100000000 HC RM PRIVATE

## 2023-06-20 PROCEDURE — 87086 URINE CULTURE/COLONY COUNT: CPT

## 2023-06-20 PROCEDURE — 83605 ASSAY OF LACTIC ACID: CPT

## 2023-06-20 PROCEDURE — 87077 CULTURE AEROBIC IDENTIFY: CPT

## 2023-06-20 PROCEDURE — 82962 GLUCOSE BLOOD TEST: CPT

## 2023-06-20 PROCEDURE — 81001 URINALYSIS AUTO W/SCOPE: CPT

## 2023-06-20 PROCEDURE — 87040 BLOOD CULTURE FOR BACTERIA: CPT

## 2023-06-20 PROCEDURE — 84100 ASSAY OF PHOSPHORUS: CPT

## 2023-06-20 PROCEDURE — 80053 COMPREHEN METABOLIC PANEL: CPT

## 2023-06-20 PROCEDURE — 92610 EVALUATE SWALLOWING FUNCTION: CPT

## 2023-06-20 PROCEDURE — 83735 ASSAY OF MAGNESIUM: CPT

## 2023-06-20 PROCEDURE — 6360000002 HC RX W HCPCS: Performed by: INTERNAL MEDICINE

## 2023-06-20 PROCEDURE — 6370000000 HC RX 637 (ALT 250 FOR IP): Performed by: HOSPITALIST

## 2023-06-20 PROCEDURE — 85025 COMPLETE CBC W/AUTO DIFF WBC: CPT

## 2023-06-20 PROCEDURE — 36600 WITHDRAWAL OF ARTERIAL BLOOD: CPT

## 2023-06-20 PROCEDURE — 87186 SC STD MICRODIL/AGAR DIL: CPT

## 2023-06-20 PROCEDURE — 82803 BLOOD GASES ANY COMBINATION: CPT

## 2023-06-20 PROCEDURE — 2580000003 HC RX 258: Performed by: HOSPITALIST

## 2023-06-20 PROCEDURE — 82550 ASSAY OF CK (CPK): CPT

## 2023-06-20 PROCEDURE — 84443 ASSAY THYROID STIM HORMONE: CPT

## 2023-06-20 PROCEDURE — 36415 COLL VENOUS BLD VENIPUNCTURE: CPT

## 2023-06-20 PROCEDURE — 2500000003 HC RX 250 WO HCPCS: Performed by: INTERNAL MEDICINE

## 2023-06-20 PROCEDURE — 2580000003 HC RX 258: Performed by: INTERNAL MEDICINE

## 2023-06-20 RX ORDER — METOPROLOL SUCCINATE 50 MG/1
50 TABLET, EXTENDED RELEASE ORAL DAILY
Status: DISCONTINUED | OUTPATIENT
Start: 2023-06-20 | End: 2023-06-24 | Stop reason: HOSPADM

## 2023-06-20 RX ORDER — MAGNESIUM SULFATE HEPTAHYDRATE 40 MG/ML
2000 INJECTION, SOLUTION INTRAVENOUS ONCE
Status: COMPLETED | OUTPATIENT
Start: 2023-06-20 | End: 2023-06-20

## 2023-06-20 RX ORDER — ATORVASTATIN CALCIUM 40 MG/1
40 TABLET, FILM COATED ORAL DAILY
Status: DISCONTINUED | OUTPATIENT
Start: 2023-06-20 | End: 2023-06-24 | Stop reason: HOSPADM

## 2023-06-20 RX ORDER — LORAZEPAM 2 MG/ML
1 INJECTION INTRAMUSCULAR EVERY 5 MIN PRN
Status: DISCONTINUED | OUTPATIENT
Start: 2023-06-20 | End: 2023-06-24 | Stop reason: HOSPADM

## 2023-06-20 RX ORDER — INSULIN LISPRO 100 [IU]/ML
0-4 INJECTION, SOLUTION INTRAVENOUS; SUBCUTANEOUS NIGHTLY
Status: DISCONTINUED | OUTPATIENT
Start: 2023-06-20 | End: 2023-06-24 | Stop reason: HOSPADM

## 2023-06-20 RX ORDER — SODIUM CHLORIDE 9 MG/ML
INJECTION, SOLUTION INTRAVENOUS CONTINUOUS
Status: DISCONTINUED | OUTPATIENT
Start: 2023-06-20 | End: 2023-06-23

## 2023-06-20 RX ORDER — DEXTROSE MONOHYDRATE 100 MG/ML
INJECTION, SOLUTION INTRAVENOUS CONTINUOUS PRN
Status: DISCONTINUED | OUTPATIENT
Start: 2023-06-20 | End: 2023-06-24 | Stop reason: HOSPADM

## 2023-06-20 RX ORDER — VITAMIN B COMPLEX
5000 TABLET ORAL DAILY
Status: DISCONTINUED | OUTPATIENT
Start: 2023-06-20 | End: 2023-06-24 | Stop reason: HOSPADM

## 2023-06-20 RX ORDER — FENOFIBRATE 54 MG/1
54 TABLET ORAL DAILY
Status: DISCONTINUED | OUTPATIENT
Start: 2023-06-20 | End: 2023-06-24 | Stop reason: HOSPADM

## 2023-06-20 RX ORDER — LEVETIRACETAM 500 MG/5ML
500 INJECTION, SOLUTION, CONCENTRATE INTRAVENOUS EVERY 12 HOURS
Status: DISCONTINUED | OUTPATIENT
Start: 2023-06-20 | End: 2023-06-22

## 2023-06-20 RX ORDER — CLOPIDOGREL BISULFATE 75 MG/1
75 TABLET ORAL DAILY
Status: DISCONTINUED | OUTPATIENT
Start: 2023-06-20 | End: 2023-06-24 | Stop reason: HOSPADM

## 2023-06-20 RX ORDER — INSULIN LISPRO 100 [IU]/ML
0-8 INJECTION, SOLUTION INTRAVENOUS; SUBCUTANEOUS
Status: DISCONTINUED | OUTPATIENT
Start: 2023-06-20 | End: 2023-06-24 | Stop reason: HOSPADM

## 2023-06-20 RX ORDER — AMLODIPINE BESYLATE 5 MG/1
5 TABLET ORAL DAILY
Status: DISCONTINUED | OUTPATIENT
Start: 2023-06-20 | End: 2023-06-24 | Stop reason: HOSPADM

## 2023-06-20 RX ORDER — INSULIN GLARGINE 100 [IU]/ML
16 INJECTION, SOLUTION SUBCUTANEOUS NIGHTLY
Status: DISCONTINUED | OUTPATIENT
Start: 2023-06-20 | End: 2023-06-24 | Stop reason: HOSPADM

## 2023-06-20 RX ORDER — LANOLIN ALCOHOL/MO/W.PET/CERES
1000 CREAM (GRAM) TOPICAL DAILY
Status: DISCONTINUED | OUTPATIENT
Start: 2023-06-20 | End: 2023-06-24 | Stop reason: HOSPADM

## 2023-06-20 RX ORDER — ASPIRIN 81 MG/1
81 TABLET, CHEWABLE ORAL DAILY
Status: DISCONTINUED | OUTPATIENT
Start: 2023-06-20 | End: 2023-06-24 | Stop reason: HOSPADM

## 2023-06-20 RX ORDER — MIRTAZAPINE 15 MG/1
7.5 TABLET, FILM COATED ORAL
Status: DISCONTINUED | OUTPATIENT
Start: 2023-06-20 | End: 2023-06-24 | Stop reason: HOSPADM

## 2023-06-20 RX ORDER — GABAPENTIN 100 MG/1
100 CAPSULE ORAL 3 TIMES DAILY
Status: DISCONTINUED | OUTPATIENT
Start: 2023-06-20 | End: 2023-06-24 | Stop reason: HOSPADM

## 2023-06-20 RX ADMIN — MIRTAZAPINE 7.5 MG: 15 TABLET, FILM COATED ORAL at 21:20

## 2023-06-20 RX ADMIN — INSULIN LISPRO 4 UNITS: 100 INJECTION, SOLUTION INTRAVENOUS; SUBCUTANEOUS at 02:00

## 2023-06-20 RX ADMIN — CEFTRIAXONE SODIUM 1000 MG: 1 INJECTION, POWDER, FOR SOLUTION INTRAMUSCULAR; INTRAVENOUS at 17:46

## 2023-06-20 RX ADMIN — SODIUM CHLORIDE, POTASSIUM CHLORIDE, SODIUM LACTATE AND CALCIUM CHLORIDE: 600; 310; 30; 20 INJECTION, SOLUTION INTRAVENOUS at 08:50

## 2023-06-20 RX ADMIN — INSULIN GLARGINE 16 UNITS: 100 INJECTION, SOLUTION SUBCUTANEOUS at 21:21

## 2023-06-20 RX ADMIN — LEVETIRACETAM 500 MG: 100 INJECTION, SOLUTION INTRAVENOUS at 10:53

## 2023-06-20 RX ADMIN — GABAPENTIN 100 MG: 100 CAPSULE ORAL at 21:20

## 2023-06-20 RX ADMIN — LEVETIRACETAM 500 MG: 100 INJECTION, SOLUTION INTRAVENOUS at 22:01

## 2023-06-20 RX ADMIN — MAGNESIUM SULFATE HEPTAHYDRATE 2000 MG: 40 INJECTION, SOLUTION INTRAVENOUS at 17:01

## 2023-06-20 RX ADMIN — SODIUM CHLORIDE: 9 INJECTION, SOLUTION INTRAVENOUS at 16:33

## 2023-06-20 NOTE — ED PROVIDER NOTES
[facility]  EMERGENCY DEPARTMENT HISTORY AND PHYSICAL EXAM      Date: 6/19/2023  Patient Name: Lucila Pardo  MRN: [id number]  YOB: 1945  Date of evaluation: 6/19/2023  Provider: Anup Gurrola MD   Note Started: 10:00 PM EDT 6/19/23    HISTORY OF PRESENT ILLNESS     Chief Complaint   Patient presents with    Altered Mental Status       History Provided By: Patient    HPI: Lucila Pardo is a 70 y.o. female with history of diabetes, hypertension, hyperlipidemia, CVA presenting with altered mental status. Patient came in from [facility] after she was found down. Last known normal at 7 AM this morning. Reportedly had a seizure in route. Patient was admitted to the hospital in [address] [place] [address] acute stroke.       PAST MEDICAL HISTORY   Past Medical History:  Past Medical History:   Diagnosis Date    Diabetes ([address]     Dysphagia     Hypercholesterolemia     Hypertension     Osteoporosis     Stroke [facility])        Past Surgical History:  Past Surgical History:   Procedure Laterality Date    BACK SURGERY      CARPAL TUNNEL RELEASE      CATARACT REMOVAL Bilateral     ORTHOPEDIC SURGERY      ankle       Family History:  Family History   Problem Relation Age of Onset    Heart Surgery Father     Hypertension Mother        Social History:  Social History     Tobacco Use    Smoking status: Never    Smokeless tobacco: Never   Substance Use Topics    Alcohol use: Not Currently    Drug use: Never       Allergies:  No Known Allergies    PCP: Anson Soriano MD    Current Meds:   Current Facility-Administered Medications   Medication Dose Route Frequency Provider Last Rate Last Admin    levETIRAcetam (KEPPRA) injection 1,500 mg  1,500 mg IntraVENous NOW Anup Gurrola MD         Current Outpatient Medications   Medication Sig Dispense Refill    amLODIPine-benazepril (LOTREL) 10-40 MG per capsule Take 1 capsule by mouth daily      aspirin 81 MG

## 2023-06-20 NOTE — ED TRIAGE NOTES
Per EMS: pt from [facility] assisted [facility]; pt found down on the ground; LKW at approx.  0700; pt with seizure en route with EMS; denies any known hx of seizures

## 2023-06-20 NOTE — ED NOTES
Kailey Verduzco from the [facility] where the patient is a resident called to check on the pt. Informed her that the patient is being admitted for seizures.      Lambertoluis White RN  06/19/23 0670

## 2023-06-20 NOTE — H&P
Hospitalist History & Physical Notes. Lucy Taylor. Name : Beth Aldana      MRN number : [id number]     YOB: 1945     Subjective :   Chief Complaint : Found with altered mental status, Seizure activity    Source of information : From ED provider, previous records. EMT notes. History of present illness:   Beth Aldana is  70 y.o. female  with history of diabetes, hypertension, hyperlipidemia with multiple CVA with residual weakness, resident of [facility] assisted living [facility] found unresponsive near bathroom. According to information she ate her dinner, 15 minutes later they found her on the floor unresponsive. Not sure exactly what happened, called emergency crew. Patient is not responding to the commands but vital stable and did not notice any respiratory distress. Patient is brought to the emergency room, during transportation and at the facility she had an episode of vomitings. It was cleaned, did not show any signs of distress. By the time she came to the hospital emergency room during transferring to the bed she started having seizure-like activity. It was witnessed,  with altered mental status which did not improve, suggest post ictal from seizures. She is given IV Keppra, admitted for further management.       Past Medical History:   Diagnosis Date    Diabetes ([address]     Dysphagia     Hypercholesterolemia     Hypertension     Osteoporosis     Stroke [facility])      Past Surgical History:   Procedure Laterality Date    BACK SURGERY      CARPAL TUNNEL RELEASE      CATARACT REMOVAL Bilateral     ORTHOPEDIC SURGERY      ankle     Family History   Problem Relation Age of Onset    Heart Surgery Father     Hypertension Mother       Social History     Tobacco Use    Smoking status: Never    Smokeless tobacco: Never   Substance Use Topics    Alcohol use: Not Currently       No Known Allergies   Current Facility-Administered Medications   Medication

## 2023-06-20 NOTE — CARE COORDINATION
06/20/23 1509   Service Assessment   Patient Orientation Self   Cognition Severely Impaired   History Provided By Child/Family  (sister)   Accompanied By/Relationship sister Tay 71 Members   Patient's Healthcare Decision Maker is: Named in [address] [place] [address]   PCP Verified by CM Yes   Last Visit to PCP Within last 3 months   Prior Functional Level Assistance with the following:;Bathing;Dressing; Toileting;Feeding;Cooking;Mobility   Current Functional Level Assistance with the following:;Bathing;Dressing; Toileting;Feeding;Cooking;Mobility   Can patient return to prior living arrangement Yes   Ability to make needs known: Good   Family able to assist with home care needs: Yes   Would you like for me to discuss the discharge plan with any other family members/significant others, and if so, who? Yes   Financial Resources [employer] None   Social/Functional History   Type of Home Assisted living     Patient is currently resident at [address] [place] [address] since march 2023. Discharge plan to return back to [place] currently.

## 2023-06-20 NOTE — ED NOTES
Called [facility] and gave report to ASH Gonzalez over the phone. Questions answered. No further concerns at this time. Will wait for telemetry box and then transport the pt to the floor.      Matthias Ghotra RN  06/20/23 9553

## 2023-06-21 LAB
ACCESSION NUMBER, LLC1M: ABNORMAL
ACINETOBACTER CALCOAC BAUMANNII COMPLEX BY PCR: NOT DETECTED
ANION GAP SERPL CALC-SCNC: 6 MMOL/L (ref 5–15)
BACTEROIDES FRAGILIS BY PCR: NOT DETECTED
BASOPHILS # BLD: 0 K/UL (ref 0–0.1)
BASOPHILS NFR BLD: 0 % (ref 0–1)
BIOFIRE TEST COMMENT: ABNORMAL
BUN SERPL-MCNC: 12 MG/DL (ref 6–20)
BUN/CREAT SERPL: 19 (ref 12–20)
CA-I BLD-MCNC: 9 MG/DL (ref 8.5–10.1)
CANDIDA ALBICANS BY PCR: NOT DETECTED
CANDIDA AURIS BY PCR: NOT DETECTED
CANDIDA GLABRATA: NOT DETECTED
CANDIDA KRUSEI BY PCR: NOT DETECTED
CANDIDA PARAPSILOSIS BY PCR: NOT DETECTED
CANDIDA TROPICALIS BY PCR: NOT DETECTED
CHLORIDE SERPL-SCNC: 113 MMOL/L (ref 97–108)
CO2 SERPL-SCNC: 25 MMOL/L (ref 21–32)
CREAT SERPL-MCNC: 0.63 MG/DL (ref 0.55–1.02)
CRP SERPL-MCNC: 2.89 MG/DL (ref 0–0.6)
CRYPTOCOCCUS NEOFORMANS/GATTII BY PCR: NOT DETECTED
DIFFERENTIAL METHOD BLD: ABNORMAL
EKG ATRIAL RATE: 97 BPM
EKG DIAGNOSIS: NORMAL
EKG P AXIS: 50 DEGREES
EKG P-R INTERVAL: 174 MS
EKG Q-T INTERVAL: 372 MS
EKG QRS DURATION: 102 MS
EKG QTC CALCULATION (BAZETT): 472 MS
EKG R AXIS: -10 DEGREES
EKG T AXIS: 51 DEGREES
EKG VENTRICULAR RATE: 97 BPM
ENTEROBACTER CLOACAE COMPLEX BY PCR: NOT DETECTED
ENTEROBACTERALES BY PCR: NOT DETECTED
ENTEROCOCCUS FAECALIS BY PCR: NOT DETECTED
ENTEROCOCCUS FAECIUM BY PCR: NOT DETECTED
EOSINOPHIL # BLD: 0.1 K/UL (ref 0–0.4)
EOSINOPHIL NFR BLD: 1 % (ref 0–7)
ERYTHROCYTE [DISTWIDTH] IN BLOOD BY AUTOMATED COUNT: 14 % (ref 11.5–14.5)
ESCHERICHIA COLI: NOT DETECTED
GLUCOSE BLD STRIP.AUTO-MCNC: 135 MG/DL (ref 65–100)
GLUCOSE BLD STRIP.AUTO-MCNC: 136 MG/DL (ref 65–100)
GLUCOSE BLD STRIP.AUTO-MCNC: 188 MG/DL (ref 65–100)
GLUCOSE SERPL-MCNC: 132 MG/DL (ref 65–100)
HAEMOPHILUS INFLUENZAE BY PCR: NOT DETECTED
HCT VFR BLD AUTO: 35.9 % (ref 35–47)
HGB BLD-MCNC: 11.1 G/DL (ref 11.5–16)
IMM GRANULOCYTES # BLD AUTO: 0 K/UL (ref 0–0.04)
IMM GRANULOCYTES NFR BLD AUTO: 0 % (ref 0–0.5)
KLEBSIELLA AEROGENES BY PCR: NOT DETECTED
KLEBSIELLA OXYTOCA BY PCR: NOT DETECTED
KLEBSIELLA PNEUMONIAE GROUP BY PCR: NOT DETECTED
LISTERIA MONOCYTOGENES BY PCR: NOT DETECTED
LYMPHOCYTES # BLD: 1.2 K/UL (ref 0.8–3.5)
LYMPHOCYTES NFR BLD: 12 % (ref 12–49)
MAGNESIUM SERPL-MCNC: 1.9 MG/DL (ref 1.6–2.4)
MCH RBC QN AUTO: 27.5 PG (ref 26–34)
MCHC RBC AUTO-ENTMCNC: 30.9 G/DL (ref 30–36.5)
MCV RBC AUTO: 89.1 FL (ref 80–99)
METHICILLIN RESISTANCE MECA/C  BY PCR: DETECTED
MONOCYTES # BLD: 0.6 K/UL (ref 0–1)
MONOCYTES NFR BLD: 6 % (ref 5–13)
NEISSERIA MENINGITIDIS BY PCR: NOT DETECTED
NEUTS SEG # BLD: 7.7 K/UL (ref 1.8–8)
NEUTS SEG NFR BLD: 81 % (ref 32–75)
NRBC # BLD: 0 K/UL (ref 0–0.01)
NRBC BLD-RTO: 0 PER 100 WBC
PERFORMED BY:: ABNORMAL
PHOSPHATE SERPL-MCNC: 2.6 MG/DL (ref 2.6–4.7)
PLATELET # BLD AUTO: 374 K/UL (ref 150–400)
PMV BLD AUTO: 10.7 FL (ref 8.9–12.9)
POTASSIUM SERPL-SCNC: 3.7 MMOL/L (ref 3.5–5.1)
PROCALCITONIN SERPL-MCNC: 0.05 NG/ML
PROTEUS BY PCR: NOT DETECTED
PSEUDOMONAS AERUGINOSA, PSAEP: NOT DETECTED
RBC # BLD AUTO: 4.03 M/UL (ref 3.8–5.2)
RESISTANT GENE TARGETS: ABNORMAL
SALMONELLA SPECIES BY PCR: NOT DETECTED
SERRATIA MARCESCENS BY PCR: NOT DETECTED
SODIUM SERPL-SCNC: 144 MMOL/L (ref 136–145)
STAPHYLOCOCCUS AUREUS: NOT DETECTED
STAPHYLOCOCCUS EPIDERMIDIS BY PCR: DETECTED
STAPHYLOCOCCUS LUGDUNENSIS BY PCR: NOT DETECTED
STAPHYLOCOCCUS: DETECTED
STENOTROPHOMONAS MALTOPHILIA BY PCR: NOT DETECTED
STREPTOCOCCUS AGALACTIAE (GROUP B): NOT DETECTED
STREPTOCOCCUS PNEUMONIAE , SPNP: NOT DETECTED
STREPTOCOCCUS PYOGENES (GROUP A), SPYOP: NOT DETECTED
STREPTOCOCCUS: NOT DETECTED
WBC # BLD AUTO: 9.6 K/UL (ref 3.6–11)

## 2023-06-21 PROCEDURE — 6370000000 HC RX 637 (ALT 250 FOR IP): Performed by: HOSPITALIST

## 2023-06-21 PROCEDURE — 84145 PROCALCITONIN (PCT): CPT

## 2023-06-21 PROCEDURE — 82962 GLUCOSE BLOOD TEST: CPT

## 2023-06-21 PROCEDURE — 92526 ORAL FUNCTION THERAPY: CPT

## 2023-06-21 PROCEDURE — 6370000000 HC RX 637 (ALT 250 FOR IP): Performed by: INTERNAL MEDICINE

## 2023-06-21 PROCEDURE — 85025 COMPLETE CBC W/AUTO DIFF WBC: CPT

## 2023-06-21 PROCEDURE — 83735 ASSAY OF MAGNESIUM: CPT

## 2023-06-21 PROCEDURE — 6360000002 HC RX W HCPCS: Performed by: INTERNAL MEDICINE

## 2023-06-21 PROCEDURE — 80048 BASIC METABOLIC PNL TOTAL CA: CPT

## 2023-06-21 PROCEDURE — 86140 C-REACTIVE PROTEIN: CPT

## 2023-06-21 PROCEDURE — 2580000003 HC RX 258: Performed by: INTERNAL MEDICINE

## 2023-06-21 PROCEDURE — 1100000000 HC RM PRIVATE

## 2023-06-21 PROCEDURE — 95819 EEG AWAKE AND ASLEEP: CPT

## 2023-06-21 PROCEDURE — 84100 ASSAY OF PHOSPHORUS: CPT

## 2023-06-21 RX ORDER — FLUCONAZOLE 100 MG/1
200 TABLET ORAL DAILY
Status: DISCONTINUED | OUTPATIENT
Start: 2023-06-21 | End: 2023-06-23

## 2023-06-21 RX ADMIN — ATORVASTATIN CALCIUM 40 MG: 40 TABLET, FILM COATED ORAL at 10:05

## 2023-06-21 RX ADMIN — FENOFIBRATE 54 MG: 54 TABLET ORAL at 10:03

## 2023-06-21 RX ADMIN — LEVETIRACETAM 500 MG: 100 INJECTION, SOLUTION INTRAVENOUS at 10:04

## 2023-06-21 RX ADMIN — METOPROLOL SUCCINATE 50 MG: 50 TABLET, EXTENDED RELEASE ORAL at 10:04

## 2023-06-21 RX ADMIN — LEVETIRACETAM 500 MG: 100 INJECTION, SOLUTION INTRAVENOUS at 21:19

## 2023-06-21 RX ADMIN — INSULIN GLARGINE 16 UNITS: 100 INJECTION, SOLUTION SUBCUTANEOUS at 21:12

## 2023-06-21 RX ADMIN — SODIUM CHLORIDE: 9 INJECTION, SOLUTION INTRAVENOUS at 07:56

## 2023-06-21 RX ADMIN — Medication 5000 UNITS: at 10:09

## 2023-06-21 RX ADMIN — GABAPENTIN 100 MG: 100 CAPSULE ORAL at 15:35

## 2023-06-21 RX ADMIN — FLUCONAZOLE 200 MG: 100 TABLET ORAL at 17:39

## 2023-06-21 RX ADMIN — AMLODIPINE BESYLATE 5 MG: 5 TABLET ORAL at 10:03

## 2023-06-21 RX ADMIN — GABAPENTIN 100 MG: 100 CAPSULE ORAL at 21:17

## 2023-06-21 RX ADMIN — SODIUM CHLORIDE: 9 INJECTION, SOLUTION INTRAVENOUS at 21:09

## 2023-06-21 RX ADMIN — GABAPENTIN 100 MG: 100 CAPSULE ORAL at 10:05

## 2023-06-21 RX ADMIN — METFORMIN HYDROCHLORIDE 500 MG: 500 TABLET ORAL at 10:05

## 2023-06-21 RX ADMIN — MIRTAZAPINE 7.5 MG: 15 TABLET, FILM COATED ORAL at 21:17

## 2023-06-21 RX ADMIN — CYANOCOBALAMIN TAB 1000 MCG 1000 MCG: 1000 TAB at 10:04

## 2023-06-21 RX ADMIN — CEFTRIAXONE SODIUM 1000 MG: 1 INJECTION, POWDER, FOR SOLUTION INTRAMUSCULAR; INTRAVENOUS at 15:34

## 2023-06-21 RX ADMIN — CLOPIDOGREL BISULFATE 75 MG: 75 TABLET ORAL at 10:04

## 2023-06-21 RX ADMIN — METFORMIN HYDROCHLORIDE 500 MG: 500 TABLET ORAL at 17:39

## 2023-06-21 RX ADMIN — ASPIRIN 81 MG: 81 TABLET, CHEWABLE ORAL at 10:04

## 2023-06-21 NOTE — ADT AUTH CERT
Nathaniel Wiggins MD  Physician  Hospitalist  Progress Notes      Signed  Date of Service:  6/20/2023  3:54 PM     Signed        Expand All Collapse All                                                                                                                                                             Hospitalist Progress Note     Daily Progress Note: 6/20/2023 3:54 PM  Chief Presenting Complaint: altered, seizure  Assessment/Plan with MDM & Differential Considerations      # AMS d/t Seizure  # Multiple CVA recently  # MOOSE/Dehydration  # Hypomagnesemia  # DM-2 on insulin and orals  # Hx HTN  # Low grade fever; etiology? DVT Prophylaxis: On AC  Code Status: DNR      MDM/Plan & Disposition:  - Cont IV Keppra  - MRI brain  - Swallow Eval  - ABG  - EEG ordered  - UA  - Blood Cx  - Empiric Abx for possible aspiration  - Discussed with nursing  - Total time: 50 mins     Admission Hx/HPI per Dr. Alisson Patterson:  Lucila Pardo is  70 y.o. female  with history of diabetes, hypertension, hyperlipidemia with multiple CVA with residual weakness, resident of [address] [place] [address] assisted living [facility] found unresponsive near bathroom. According to information she ate her dinner, 15 minutes later they found her on the floor unresponsive. Not sure exactly what happened, called emergency crew. Patient is not responding to the commands but vital stable and did not notice any respiratory distress. Patient is brought to the emergency room, during transportation and at the facility she had an episode of vomitings. It was cleaned, did not show any signs of distress. By the time she came to the hospital emergency room during transferring to the bed she started having seizure-like activity. It was witnessed,  with altered mental status which did not improve, suggest post ictal from seizures.   She is given IV Keppra, admitted for further management.  ----------------------------------------     Subjective: Very lethargic; no

## 2023-06-21 NOTE — CARE COORDINATION
CM reviewed chart. Patient from [facility]. Current plans are to return back to [facility]. Awaiting imaging studies. PT/OT ordered as well for safe discharge planning.

## 2023-06-22 ENCOUNTER — APPOINTMENT (OUTPATIENT)
Facility: HOSPITAL | Age: 78
DRG: 100 | End: 2023-06-22
Payer: MEDICARE

## 2023-06-22 LAB
GLUCOSE BLD STRIP.AUTO-MCNC: 144 MG/DL (ref 65–100)
GLUCOSE BLD STRIP.AUTO-MCNC: 153 MG/DL (ref 65–100)
GLUCOSE BLD STRIP.AUTO-MCNC: 156 MG/DL (ref 65–100)
GLUCOSE BLD STRIP.AUTO-MCNC: 92 MG/DL (ref 65–100)
PERFORMED BY:: ABNORMAL
PERFORMED BY:: NORMAL

## 2023-06-22 PROCEDURE — 2580000003 HC RX 258: Performed by: INTERNAL MEDICINE

## 2023-06-22 PROCEDURE — 92526 ORAL FUNCTION THERAPY: CPT

## 2023-06-22 PROCEDURE — 6370000000 HC RX 637 (ALT 250 FOR IP): Performed by: INTERNAL MEDICINE

## 2023-06-22 PROCEDURE — 99223 1ST HOSP IP/OBS HIGH 75: CPT | Performed by: INTERNAL MEDICINE

## 2023-06-22 PROCEDURE — 70551 MRI BRAIN STEM W/O DYE: CPT

## 2023-06-22 PROCEDURE — 97161 PT EVAL LOW COMPLEX 20 MIN: CPT

## 2023-06-22 PROCEDURE — 97165 OT EVAL LOW COMPLEX 30 MIN: CPT

## 2023-06-22 PROCEDURE — 6360000002 HC RX W HCPCS: Performed by: INTERNAL MEDICINE

## 2023-06-22 PROCEDURE — 82962 GLUCOSE BLOOD TEST: CPT

## 2023-06-22 PROCEDURE — 97530 THERAPEUTIC ACTIVITIES: CPT

## 2023-06-22 PROCEDURE — 6370000000 HC RX 637 (ALT 250 FOR IP): Performed by: HOSPITALIST

## 2023-06-22 PROCEDURE — 1100000000 HC RM PRIVATE

## 2023-06-22 RX ORDER — LEVETIRACETAM 250 MG/1
500 TABLET ORAL 2 TIMES DAILY
Status: DISCONTINUED | OUTPATIENT
Start: 2023-06-22 | End: 2023-06-24 | Stop reason: HOSPADM

## 2023-06-22 RX ADMIN — FENOFIBRATE 54 MG: 54 TABLET ORAL at 09:52

## 2023-06-22 RX ADMIN — MIRTAZAPINE 7.5 MG: 15 TABLET, FILM COATED ORAL at 22:27

## 2023-06-22 RX ADMIN — LEVETIRACETAM 500 MG: 100 INJECTION, SOLUTION INTRAVENOUS at 09:54

## 2023-06-22 RX ADMIN — GABAPENTIN 100 MG: 100 CAPSULE ORAL at 15:36

## 2023-06-22 RX ADMIN — CEFTRIAXONE SODIUM 1000 MG: 1 INJECTION, POWDER, FOR SOLUTION INTRAMUSCULAR; INTRAVENOUS at 18:12

## 2023-06-22 RX ADMIN — GABAPENTIN 100 MG: 100 CAPSULE ORAL at 21:15

## 2023-06-22 RX ADMIN — FLUCONAZOLE 200 MG: 100 TABLET ORAL at 09:54

## 2023-06-22 RX ADMIN — INSULIN GLARGINE 16 UNITS: 100 INJECTION, SOLUTION SUBCUTANEOUS at 21:15

## 2023-06-22 RX ADMIN — GABAPENTIN 100 MG: 100 CAPSULE ORAL at 09:54

## 2023-06-22 RX ADMIN — CLOPIDOGREL BISULFATE 75 MG: 75 TABLET ORAL at 09:52

## 2023-06-22 RX ADMIN — LEVETIRACETAM 500 MG: 250 TABLET, FILM COATED ORAL at 21:15

## 2023-06-22 RX ADMIN — METFORMIN HYDROCHLORIDE 500 MG: 500 TABLET ORAL at 18:15

## 2023-06-22 RX ADMIN — ATORVASTATIN CALCIUM 40 MG: 40 TABLET, FILM COATED ORAL at 09:54

## 2023-06-22 RX ADMIN — AMLODIPINE BESYLATE 5 MG: 5 TABLET ORAL at 09:54

## 2023-06-22 RX ADMIN — Medication 5000 UNITS: at 09:52

## 2023-06-22 RX ADMIN — CYANOCOBALAMIN TAB 1000 MCG 1000 MCG: 1000 TAB at 09:54

## 2023-06-22 RX ADMIN — METOPROLOL SUCCINATE 50 MG: 50 TABLET, EXTENDED RELEASE ORAL at 09:52

## 2023-06-22 RX ADMIN — ASPIRIN 81 MG: 81 TABLET, CHEWABLE ORAL at 09:54

## 2023-06-22 RX ADMIN — METFORMIN HYDROCHLORIDE 500 MG: 500 TABLET ORAL at 09:52

## 2023-06-23 LAB
ANION GAP SERPL CALC-SCNC: 3 MMOL/L (ref 5–15)
BACTERIA SPEC CULT: ABNORMAL
BACTERIA SPEC CULT: ABNORMAL
BASOPHILS # BLD: 0 K/UL (ref 0–0.1)
BASOPHILS NFR BLD: 0 % (ref 0–1)
BUN SERPL-MCNC: 8 MG/DL (ref 6–20)
BUN/CREAT SERPL: 12 (ref 12–20)
CA-I BLD-MCNC: 8.6 MG/DL (ref 8.5–10.1)
CHLORIDE SERPL-SCNC: 114 MMOL/L (ref 97–108)
CO2 SERPL-SCNC: 26 MMOL/L (ref 21–32)
COLONY COUNT, CNT: ABNORMAL
CREAT SERPL-MCNC: 0.68 MG/DL (ref 0.55–1.02)
CRP SERPL-MCNC: 5.92 MG/DL (ref 0–0.6)
DIFFERENTIAL METHOD BLD: ABNORMAL
EOSINOPHIL # BLD: 0.2 K/UL (ref 0–0.4)
EOSINOPHIL NFR BLD: 3 % (ref 0–7)
ERYTHROCYTE [DISTWIDTH] IN BLOOD BY AUTOMATED COUNT: 13.8 % (ref 11.5–14.5)
GLUCOSE BLD STRIP.AUTO-MCNC: 121 MG/DL (ref 65–100)
GLUCOSE BLD STRIP.AUTO-MCNC: 158 MG/DL (ref 65–100)
GLUCOSE BLD STRIP.AUTO-MCNC: 183 MG/DL (ref 65–100)
GLUCOSE BLD STRIP.AUTO-MCNC: 86 MG/DL (ref 65–100)
GLUCOSE SERPL-MCNC: 82 MG/DL (ref 65–100)
HCT VFR BLD AUTO: 32.9 % (ref 35–47)
HGB BLD-MCNC: 10.3 G/DL (ref 11.5–16)
IMM GRANULOCYTES # BLD AUTO: 0 K/UL (ref 0–0.04)
IMM GRANULOCYTES NFR BLD AUTO: 0 % (ref 0–0.5)
LYMPHOCYTES # BLD: 1.2 K/UL (ref 0.8–3.5)
LYMPHOCYTES NFR BLD: 17 % (ref 12–49)
Lab: ABNORMAL
MAGNESIUM SERPL-MCNC: 1.4 MG/DL (ref 1.6–2.4)
MCH RBC QN AUTO: 27.8 PG (ref 26–34)
MCHC RBC AUTO-ENTMCNC: 31.3 G/DL (ref 30–36.5)
MCV RBC AUTO: 88.9 FL (ref 80–99)
MONOCYTES # BLD: 0.5 K/UL (ref 0–1)
MONOCYTES NFR BLD: 8 % (ref 5–13)
NEUTS SEG # BLD: 4.9 K/UL (ref 1.8–8)
NEUTS SEG NFR BLD: 72 % (ref 32–75)
NRBC # BLD: 0 K/UL (ref 0–0.01)
NRBC BLD-RTO: 0 PER 100 WBC
PERFORMED BY:: ABNORMAL
PERFORMED BY:: NORMAL
PLATELET # BLD AUTO: 373 K/UL (ref 150–400)
PMV BLD AUTO: 10.3 FL (ref 8.9–12.9)
POTASSIUM SERPL-SCNC: 3.6 MMOL/L (ref 3.5–5.1)
PROCALCITONIN SERPL-MCNC: <0.05 NG/ML
RBC # BLD AUTO: 3.7 M/UL (ref 3.8–5.2)
SODIUM SERPL-SCNC: 143 MMOL/L (ref 136–145)
WBC # BLD AUTO: 6.8 K/UL (ref 3.6–11)

## 2023-06-23 PROCEDURE — 92526 ORAL FUNCTION THERAPY: CPT

## 2023-06-23 PROCEDURE — 1100000000 HC RM PRIVATE

## 2023-06-23 PROCEDURE — 2500000003 HC RX 250 WO HCPCS: Performed by: INTERNAL MEDICINE

## 2023-06-23 PROCEDURE — 6370000000 HC RX 637 (ALT 250 FOR IP): Performed by: INTERNAL MEDICINE

## 2023-06-23 PROCEDURE — 83735 ASSAY OF MAGNESIUM: CPT

## 2023-06-23 PROCEDURE — 80048 BASIC METABOLIC PNL TOTAL CA: CPT

## 2023-06-23 PROCEDURE — 6370000000 HC RX 637 (ALT 250 FOR IP): Performed by: HOSPITALIST

## 2023-06-23 PROCEDURE — 86140 C-REACTIVE PROTEIN: CPT

## 2023-06-23 PROCEDURE — 2580000003 HC RX 258: Performed by: INTERNAL MEDICINE

## 2023-06-23 PROCEDURE — 84145 PROCALCITONIN (PCT): CPT

## 2023-06-23 PROCEDURE — 36415 COLL VENOUS BLD VENIPUNCTURE: CPT

## 2023-06-23 PROCEDURE — 99232 SBSQ HOSP IP/OBS MODERATE 35: CPT | Performed by: INTERNAL MEDICINE

## 2023-06-23 PROCEDURE — 82962 GLUCOSE BLOOD TEST: CPT

## 2023-06-23 PROCEDURE — 6360000002 HC RX W HCPCS: Performed by: INTERNAL MEDICINE

## 2023-06-23 PROCEDURE — 85025 COMPLETE CBC W/AUTO DIFF WBC: CPT

## 2023-06-23 RX ORDER — MAGNESIUM SULFATE HEPTAHYDRATE 40 MG/ML
2000 INJECTION, SOLUTION INTRAVENOUS ONCE
Status: COMPLETED | OUTPATIENT
Start: 2023-06-23 | End: 2023-06-23

## 2023-06-23 RX ORDER — LEVOFLOXACIN 500 MG/1
750 TABLET, FILM COATED ORAL DAILY
Status: DISCONTINUED | OUTPATIENT
Start: 2023-06-23 | End: 2023-06-24 | Stop reason: HOSPADM

## 2023-06-23 RX ADMIN — CLOPIDOGREL BISULFATE 75 MG: 75 TABLET ORAL at 09:13

## 2023-06-23 RX ADMIN — Medication 5000 UNITS: at 09:13

## 2023-06-23 RX ADMIN — LEVOFLOXACIN 750 MG: 500 TABLET, FILM COATED ORAL at 17:25

## 2023-06-23 RX ADMIN — MIRTAZAPINE 7.5 MG: 15 TABLET, FILM COATED ORAL at 20:26

## 2023-06-23 RX ADMIN — LEVETIRACETAM 500 MG: 250 TABLET, FILM COATED ORAL at 21:30

## 2023-06-23 RX ADMIN — CYANOCOBALAMIN TAB 1000 MCG 1000 MCG: 1000 TAB at 09:14

## 2023-06-23 RX ADMIN — GABAPENTIN 100 MG: 100 CAPSULE ORAL at 21:30

## 2023-06-23 RX ADMIN — METFORMIN HYDROCHLORIDE 500 MG: 500 TABLET ORAL at 09:14

## 2023-06-23 RX ADMIN — LEVETIRACETAM 500 MG: 250 TABLET, FILM COATED ORAL at 09:14

## 2023-06-23 RX ADMIN — GABAPENTIN 100 MG: 100 CAPSULE ORAL at 09:14

## 2023-06-23 RX ADMIN — METOPROLOL SUCCINATE 50 MG: 50 TABLET, EXTENDED RELEASE ORAL at 09:14

## 2023-06-23 RX ADMIN — MAGNESIUM SULFATE HEPTAHYDRATE 2000 MG: 40 INJECTION, SOLUTION INTRAVENOUS at 17:19

## 2023-06-23 RX ADMIN — ASPIRIN 81 MG: 81 TABLET, CHEWABLE ORAL at 09:14

## 2023-06-23 RX ADMIN — AMLODIPINE BESYLATE 5 MG: 5 TABLET ORAL at 09:15

## 2023-06-23 RX ADMIN — FENOFIBRATE 54 MG: 54 TABLET ORAL at 09:13

## 2023-06-23 RX ADMIN — METFORMIN HYDROCHLORIDE 500 MG: 500 TABLET ORAL at 17:21

## 2023-06-23 RX ADMIN — GABAPENTIN 100 MG: 100 CAPSULE ORAL at 15:07

## 2023-06-23 RX ADMIN — FLUCONAZOLE 200 MG: 100 TABLET ORAL at 09:14

## 2023-06-23 RX ADMIN — SODIUM CHLORIDE: 9 INJECTION, SOLUTION INTRAVENOUS at 02:00

## 2023-06-23 RX ADMIN — ATORVASTATIN CALCIUM 40 MG: 40 TABLET, FILM COATED ORAL at 09:14

## 2023-06-23 RX ADMIN — CEFTRIAXONE SODIUM 1000 MG: 1 INJECTION, POWDER, FOR SOLUTION INTRAMUSCULAR; INTRAVENOUS at 15:05

## 2023-06-23 RX ADMIN — INSULIN GLARGINE 16 UNITS: 100 INJECTION, SOLUTION SUBCUTANEOUS at 21:30

## 2023-06-23 NOTE — PLAN OF CARE
OCCUPATIONAL THERAPY EVALUATION  Patient: Fabiola Mejía (79 y.o. female)  Date: 6/22/2023  Primary Diagnosis: Seizure ([facility] [address] [R56.9]  Post-ictal state ([address] [R56.9]       Precautions: General Precautions                In place during session:External Catheter and EKG/telemetry     ASSESSMENT  Pt is a 70 y.o. female presenting to [facility] with c/o seizure activity and alerted mental status, admitted 6/19 and currently being treated for seizures and post -ical seizure. Awaiting MRI results. Pt received sitting at EOB w/ PT upon arrival, AXO x1, and agreeable to OT/PT evaluation. Based on current observations, pt presents with decreased  functional mobility, independence in ADLs, ROM, strength, sensation, activity tolerance, endurance, balance (see below for objective details and assist levels). Overall, pt tolerates session fair with R facial droop and flaccid RUE impacting functional mobility/ADLs. Pt noted to have difficulty managing secretions. No subluxation noted in R shoulder and trace movement noted in R hand. She req'd CGA-min A for sitting balance; noted to have posterior lean req'ing tactile and verbal cues to self correct. Pt req'd max A x2 for STS using RW; demo'd difficulty maintain upright balance and had posterior lean on bed. Returned to supine and req'd total A for posterior pericare. Pt will benefit from continued skilled OT services to address current impairments and improve IND and safety with self cares and functional transfers/mobility. Current OT d/c recommendation SNF vs. Return to skilled nursing pending PLOF once medically appropriate. Other factors to consider for discharge: family/social support, DME, time since onset, severity of deficits, functional baseline     Patient will benefit from skilled therapy intervention to address the above noted impairments.        PLAN :  Recommendations and Planned Interventions: self care training, therapeutic activities, functional mobility training,
Problem: Discharge Planning  Goal: Discharge to home or other facility with appropriate resources  6/22/2023 0340 by Emile Crowe RN  Outcome: Progressing  6/21/2023 1613 by Sylvain Brizuela RN  Outcome: Progressing  Flowsheets (Taken 6/21/2023 6421)  Discharge to home or other facility with appropriate resources:   Identify barriers to discharge with patient and caregiver   Arrange for needed discharge resources and transportation as appropriate   Identify discharge learning needs (meds, wound care, etc)   Arrange for interpreters to assist at discharge as needed     Problem: Pain  Goal: Verbalizes/displays adequate comfort level or baseline comfort level  6/22/2023 0340 by Emile Crowe RN  Outcome: Progressing  6/21/2023 1613 by Sylvain Brizuela RN  Outcome: Progressing  Flowsheets  Taken 6/21/2023 1444  Verbalizes/displays adequate comfort level or baseline comfort level:   Assess pain using appropriate pain scale   Administer analgesics based on type and severity of pain and evaluate response  Taken 6/21/2023 0749  Verbalizes/displays adequate comfort level or baseline comfort level:   Assess pain using appropriate pain scale   Administer analgesics based on type and severity of pain and evaluate response   Implement non-pharmacological measures as appropriate and evaluate response     Problem: Skin/Tissue Integrity  Goal: Absence of new skin breakdown  Description: 1. Monitor for areas of redness and/or skin breakdown  2. Assess vascular access sites hourly  3. Every 4-6 hours minimum:  Change oxygen saturation probe site  4. Every 4-6 hours:  If on nasal continuous positive airway pressure, respiratory therapy assess nares and determine need for appliance change or resting period.   6/22/2023 0340 by Emile Crowe RN  Outcome: Progressing  6/21/2023 1613 by Sylvain Brizuela RN  Outcome: Progressing     Problem: Safety - Adult  Goal: Free from fall injury  6/22/2023 0340 by Emile Crowe
Problem: Discharge Planning  Goal: Discharge to home or other facility with appropriate resources  Outcome: Progressing     Problem: Pain  Goal: Verbalizes/displays adequate comfort level or baseline comfort level  6/20/2023 2140 by Thu Cheek RN  Outcome: Progressing  Flowsheets (Taken 6/20/2023 1955)  Verbalizes/displays adequate comfort level or baseline comfort level:   Encourage patient to monitor pain and request assistance   Assess pain using appropriate pain scale   Administer analgesics based on type and severity of pain and evaluate response   Implement non-pharmacological measures as appropriate and evaluate response  6/20/2023 1011 by Jovi Kevin RN  Outcome: Progressing  Flowsheets (Taken 6/20/2023 0025 by Thu Cheek RN)  Verbalizes/displays adequate comfort level or baseline comfort level:   Encourage patient to monitor pain and request assistance   Assess pain using appropriate pain scale   Administer analgesics based on type and severity of pain and evaluate response   Implement non-pharmacological measures as appropriate and evaluate response     Problem: Skin/Tissue Integrity  Goal: Absence of new skin breakdown  Description: 1. Monitor for areas of redness and/or skin breakdown  2. Assess vascular access sites hourly  3. Every 4-6 hours minimum:  Change oxygen saturation probe site  4. Every 4-6 hours:  If on nasal continuous positive airway pressure, respiratory therapy assess nares and determine need for appliance change or resting period.   6/20/2023 2140 by Thu Cheek RN  Outcome: Progressing  6/20/2023 1011 by Jovi Kevin RN  Outcome: Progressing     Problem: Safety - Adult  Goal: Free from fall injury  6/20/2023 2140 by Thu Cheek RN  Outcome: Progressing  Flowsheets (Taken 6/20/2023 2139)  Free From Fall Injury: Instruct family/caregiver on patient safety  6/20/2023 1011 by Jovi Kevin RN  Outcome: Progressing     Problem: SLP Adult - Impaired
Problem: Discharge Planning  Goal: Discharge to home or other facility with appropriate resources  Outcome: Progressing  Flowsheets (Taken 6/22/2023 1945)  Discharge to home or other facility with appropriate resources: Identify barriers to discharge with patient and caregiver     Problem: Pain  Goal: Verbalizes/displays adequate comfort level or baseline comfort level  Outcome: Progressing     Problem: Skin/Tissue Integrity  Goal: Absence of new skin breakdown  Description: 1. Monitor for areas of redness and/or skin breakdown  2. Assess vascular access sites hourly  3. Every 4-6 hours minimum:  Change oxygen saturation probe site  4. Every 4-6 hours:  If on nasal continuous positive airway pressure, respiratory therapy assess nares and determine need for appliance change or resting period. Outcome: Progressing     Problem: Safety - Adult  Goal: Free from fall injury  Outcome: Progressing     Problem: SLP Adult - Impaired Swallowing  Goal: By Discharge: Advance to least restrictive diet without signs or symptoms of aspiration for planned discharge setting. See evaluation for individualized goals. Description: Speech Therapy Swallow Goals    Initiated 6/20/2023    -Patient stated goal: Pt did not state, impaired mentation, speech and lang is present    -Patient will tolerate moderately thick liquids full diet without clinical indicators of aspiration given mod cues within 7 day(s). -Patient will tolerate moderately thick liquids puree diet without clinical indicators of aspiration given mod cues within 7 day(s). -Patient will tolerate PO trials without clinical indicators of aspiration given mod cues within 7 day(s).       -Patient will participate in modified barium swallow study within 7 day(s), as indicated pending pt's progress.      -Patient will demonstrate understanding of swallow safety precautions and aspiration precautions, diet recs with mod cues within 7 day(s).           6/22/2023 1657 by
Problem: Pain  Goal: Verbalizes/displays adequate comfort level or baseline comfort level  Outcome: Progressing  Flowsheets (Taken 6/20/2023 0025 by Victor Manuel Wilkins RN)  Verbalizes/displays adequate comfort level or baseline comfort level:   Encourage patient to monitor pain and request assistance   Assess pain using appropriate pain scale   Administer analgesics based on type and severity of pain and evaluate response   Implement non-pharmacological measures as appropriate and evaluate response     Problem: Skin/Tissue Integrity  Goal: Absence of new skin breakdown  Description: 1. Monitor for areas of redness and/or skin breakdown  2. Assess vascular access sites hourly  3. Every 4-6 hours minimum:  Change oxygen saturation probe site  4. Every 4-6 hours:  If on nasal continuous positive airway pressure, respiratory therapy assess nares and determine need for appliance change or resting period.   Outcome: Progressing     Problem: Safety - Adult  Goal: Free from fall injury  Outcome: Progressing
Problem: Physical Therapy - Adult  Goal: By Discharge: Performs mobility at highest level of function for planned discharge setting. See evaluation for individualized goals. Description: FUNCTIONAL STATUS PRIOR TO ADMISSION: Pt baseline is unknown and unable to acquire due to cognitive status. Per chart review, pt had a CVA in March, with R sided deficits. HOME SUPPORT PRIOR TO ADMISSION: Pt admitted from [facility]. Physical Therapy Goals  Initiated 6/22/2023  Pt stated goal: to get better  Pt will be I with LE HEP in 7 days. Pt will perform bed mobility with Minimal Assist in 7 days. Pt will perform transfers with Minimal Assist in 7 days. Pt will amb 5-10 feet with LRAD safely with Minimal Assist in 7 days. Pt will demonstrate improvement in dynamic standing balance from Maximal Assist to Minimal Assist in 7 days. Outcome: Progressing   PHYSICAL THERAPY EVALUATION  Patient: Steven Munoz (79 y.o. female)  Date: 6/22/2023  Primary Diagnosis: Seizure ([facility] [address] [R56.9]  Post-ictal state ([address] [R56.9]       Precautions: Restrictions/Precautions  Restrictions/Precautions: General Precautions   In place during session: None  ASSESSMENT  Pt is a 70 y.o. female admitted on 6/19/2023 for found with AMS; pt currently being treated for AMS, seizures, DM II, HTN, history of CVA, and anemia. Pt received semi-supine upon PT arrival, agreeable to evaluation. Pt A&O x 1. Per chart review, pt was admitted from [address] [place] [address] where they have resided since March. Pt with PMH of CVA and R sided weakness per chart review. Based on the objective data described below, the patient currently presents with impaired functional mobility, decreased independence in ADLs, impaired ability to perform high-level IADLs, decreased ROM, impaired strength, poor body mechanics, impaired cognition, and impaired posture. (See below for objective details and assist levels).      Overall pt tolerated session fair today with no grimace
Speech LAnguage Pathology Dysphagia EVALUATION    Patient: Leena Mccullough (79 y.o. female)  Date: 6/20/2023  Primary Diagnosis: Seizure ([address] [R56.9]  Post-ictal state ([address] [R56.9]       Precautions: aspiration, sz    ASSESSMENT :  Based on the objective data described below, the patient presents with mod-severe oropharyngeal dysphagia, negatively impacted by drowsiness. Pt seen for bedside sw evaluation, see details below. Pt admitted for stroke workup due to AMS, witnessed sz activity. Pt also with brown emesis per report. Pt with hx of CVA with most recent MBS 3/7/2023, aspiration risk noted. Pt was recommended by SLP for full mod thick liquids at that time. Previous right weakness, dysarthria, suspected apraxia noted. Per hard chart review, pt's most recent diet at [facility] was mech soft/thin. Pt is drowsy but arousable and responsive. Pt follows simple commands. Global facial weakness is present, right greater than left. Oral phase with weak labial closure, delayed a-p propulsion. Pharyngeal phase weak with mild delay. Pt with intermittent mild throat clear. No other s/s aspiration. Patient will benefit from skilled intervention to address the above impairments. PLAN :  Recommendations and Planned Interventions:  Diet: moderately thick FULL liquids   1:1 assistance with ALL PO intake, STRICT aspiration and GERD precautions, monitor pt closely for s/s aspiration, meds crushed if able in applesauce or pudding, FEED ONLY IF AWAKE AND ALERT. Will request MBS when appropriate given dysphagia hx with aspiration risk. Acute SLP Services: Yes, patient will be followed by speech-language pathology 5x/week to address goals. Patient's rehabilitation potential is considered to be Guarded. Discharge Recommendations: cont SLP tx at this time. SUBJECTIVE:   Pt seen for assessment, drowsy but responsive and agreeable to PO trials.      OBJECTIVE:     Past Medical History:
Speech LAnguage Pathology Dysphagia TREATMENT    Patient: Christian Levin (79 y.o. female)  Date: 6/22/2023  Primary Diagnosis: Seizure ([facility] [address] [R56.9]  Post-ictal state ([facility] [address] [R56.9]       Precautions: aspiration, General Precautions    ASSESSMENT :  Based on the objective data described below, the patient presents with improving oropharyngeal sw function, moderate dysphagia persists. Pt seen for follow-up. Significant improvement in mentation is noted. Pt is alert, communicates effectively and clearly in complete sentences. Oral care provided. Pt placed top dentures in mouth for session. Pt given trials of thin via cup/straw, mildly thick via straw, moistened solids and puree. Oral phase with delayed a-p propulsion, functional but mod impairment in oral bolus control and formation. Mild lingual stasis with solid trials. Pharyngeal phase delayed and weak but functional to palpation. Cough following thin and solid trials, delayed. Concerns for aspiration persists. MRI results noted, no acute process per report. Patient will benefit from skilled intervention to address the above impairments. PLAN :  Recommendations and Planned Interventions:  Diet: Puree and moderately thick liquids  1:1 assistance with ALL PO intake, STRICT aspiration and GERD precautions, monitor pt closely for s/s aspiration, meds crushed if able in applesauce or pudding, FEED ONLY IF AWAKE AND ALERT. Will cont to follow and request repeat MBS as indicated pending pt's progress. Acute SLP Services: Yes, patient will be followed by speech-language pathology 5x/week to address goals. Patient's rehabilitation potential is considered to be Good. Discharge Recommendations: cont SLP tx at this time. SUBJECTIVE:   Pt alert, agreeable, pleasant and talkative.      OBJECTIVE:     Past Medical History:   Diagnosis Date    Diabetes ([facility] [address]     Dysphagia     Hypercholesterolemia     Hypertension     Osteoporosis     Stroke ([facility] [address]
Speech LAnguage Pathology Dysphagia TREATMENT    Patient: Hasmukh Pacheco (79 y.o. female)  Date: 6/21/2023  Primary Diagnosis: Seizure ([facility] [address] [R56.9]  Post-ictal state ([address] [R56.9]       Precautions: aspiration                    ASSESSMENT :  Patient continues w/ moderate oral, oropharyngeal dysphagia. Patient continues to be postictal. RN reports minimal intakes and delayed coughing. Administered mildly thick liquids via cup and spoon. Patient w/ reduced labial closure around spoon and moderate anterior spillage of liquids right side. Lingual pumping w/ swallow delay. Pharyngeal response slightly reduced. No clinical indicators of penetration or aspiration noted. Cued patient to tight labial seal around cup. Patient w/ difficulty and anterior spillage continued. Patient w/ slow oral transit of puree ( applesauce ) and swallow delay. No overt s/sx of aspiration. Patient does not appear appropriate for diet advancement at this time s/t lethargy. Patient will benefit from skilled intervention to address the above impairments. PLAN :  Recommendations and Planned Interventions:  Diet:  Full, moderately thick. Aspiration precautions. Do not feed if not alert. MBS as indicated. Acute SLP Services: Yes, patient will be followed by speech-language pathology 5x/week to address goals. Patient's rehabilitation potential is considered to be Good. Discharge Recommendations: To Be Determined     SUBJECTIVE:   Patient drowsy and seen at bedside. She alerts to verbal cues.    OBJECTIVE:     Past Medical History:   Diagnosis Date    Diabetes ([facility] [address]     Dysphagia     Hypercholesterolemia     Hypertension     Osteoporosis     Stroke [facility])      Past Surgical History:   Procedure Laterality Date    BACK SURGERY      CARPAL TUNNEL RELEASE      CATARACT REMOVAL Bilateral     ORTHOPEDIC SURGERY      ankle     Prior Level of Function/Home Situation:   Social/Functional History  Type of Home: Assisted
Level of Function/Home Situation:   Social/Functional History  Lives With:  (Unable to acquire pt social history due to impaired cognitive status)  Type of Home: Assisted living    Cognitive and Communication Status:  Neurologic State: Alert  Orientation Level: Oriented to person  Cognition: Follows commands    After Treatment:  Patient left in no apparent distress in bed, Call bell left within reach, and Nursing notified     Pain:  VAS (numerical) 0    COMMUNICATION/EDUCATION:   Swallow safety precautions, Aspiration precautions, Diet recommendations, and Compensatory strategies education provided to Patient and Nurse via explanation, all questions/concerns addressed. Patient verbalizes understanding. The patient's plan of care including recommendations, planned interventions, and recommended diet changes were discussed with: Registered nurse. Patient/family have participated as able in goal setting and plan of care    Thank you,  Allyson Ortiz SLP  Minutes: 10   Problem: SLP Adult - Impaired Swallowing  Goal: By Discharge: Advance to least restrictive diet without signs or symptoms of aspiration for planned discharge setting. See evaluation for individualized goals. Description: Speech Therapy Swallow Goals    Initiated 6/20/2023    -Patient stated goal: Pt did not state, impaired mentation, speech and lang is present    -Patient will tolerate moderately thick liquids full diet without clinical indicators of aspiration given mod cues within 7 day(s). -Patient will tolerate moderately thick liquids puree diet without clinical indicators of aspiration given mod cues within 7 day(s). -Patient will tolerate PO trials without clinical indicators of aspiration given mod cues within 7 day(s).       -Patient will participate in modified barium swallow study within 7 day(s), as indicated pending pt's progress.      -Patient will demonstrate understanding of swallow safety precautions and aspiration

## 2023-06-23 NOTE — PROGRESS NOTES
0025.Received patient from ED, noted patient is unconscious, GCS 6/15 (E1V1M4) and is in active vomiting of brown emesis output. Transferred to bed, vital signs taken and recorded. Positioned on semi-fowlers, left side lying, oral suctioning performed. Skin assessment done by the writer and Regino Bass RN; Aside from scattered bruises on bilateral upper extremities and a non-blanchable redness on the sacrum, the skin is intact. Applied a zinc paste and a optifoam on the sacral area for prophylaxis. 0111. Called [facility] Tele-Neuroloy [phone]) and talked to Omega to placed a Routine Tele-Neuro consult for the patient at this time. Will continue to monitor patient.
Hospitalist Progress Note    Daily Progress Note: 2023 4:58 PM  Chief Presenting Complaint: altered, seizure  Assessment/Plan with MDM & Differential Considerations     # AMS d/t Seizure, MOOSE and infection ==> much improved  # Multiple CVA recently; MRI  negative for new event  # MOOSE/Dehydration; Cr 1.13 --> 0.6  # Hypomagnesemia; Mg 1.2 --> 1.9  # DM-2 on insulin and orals  # Hx HTN  # Low grade fever, most likely d/t UTI and probable cause for AMS and reactive seizure. U/A mod LE, turbid, yeast; Bld Cx with Staph epi/MRSA x2 but repeat Cx NGTD. Procal <0.05, CRP 2.89; WBC 16 --> 10 --> 6.8    DVT Prophylaxis: On AC  Code Status: DNR     MDM/Plan & Disposition:    :  - Evaluated by ID; Bld cx contaminant --> Dc Rocephin  - Dc IVF  - DC planning; discussed with case mgmt  - Total time: 30 mins    :  - Met with son (who is here from [place], [place]); reviewed findings and mgmt, plans; answered questions. - Cont IV Abx, Keppra  - Consult ID for blood cx eval  - Total Time 35 mins    :  - Cont IV Abx  - Await MRI, EEG  - Cont IV Keppra  - Repeat CXR tomorrow  - Total Time: 35 mins      - Cont IV Keppra  - MRI brain  - Swallow Eval  - STAT AB.45/35/80  - EEG ordered  - UA  - Blood Cx  - Empiric Abx for possible aspiration  - Discussed with nursing  - Total time: 50 mins    Admission Hx/HPI per Dr. Leal Center:  Katalina Haddad is  70 y.o. female  with history of diabetes, hypertension, hyperlipidemia with multiple CVA with residual weakness, resident of [address] [place] [address] assisted living facility found unresponsive near bathroom. According to information she ate her dinner, 15 minutes later they found her on the floor unresponsive. Not sure exactly what happened, called emergency crew. Patient is not responding to the commands but vital stable and did not notice any respiratory distress.   Patient is brought to the emergency room, during transportation and at the facility she had an episode of
Hospitalist Progress Note    Daily Progress Note: 2023 5:02 PM  Chief Presenting Complaint: altered, seizure  Assessment/Plan with MDM & Differential Considerations     # AMS d/t Seizure, MOOSE and infection ==> much improved  # Multiple CVA recently; MRI  negative for new event  # MOOSE/Dehydration; Cr 1.13 --> 0.6  # Hypomagnesemia; Mg 1.2 --> 1.9  # DM-2 on insulin and orals  # Hx HTN  # Low grade fever, most likely d/t UTI and probable cause for AMS and reactive seizure. U/A mod LE, turbid, yeast; Bld Cx with Staph epi/MRSA x2 but repeat Cx NGTD. Procal 0.05, CRP 2.89; WBC 16 --> 10. DVT Prophylaxis: On AC  Code Status: DNR     MDM/Plan & Disposition:    :  - Met with son (who is here from [place], [place]); reviewed findings and mgmt, plans; answered questions. - Cont IV Abx, Keppra  - Consult ID for blood cx eval  - Total Time 35 mins    :  - Cont IV Abx  - Await MRI, EEG  - Cont IV Keppra  - Repeat CXR tomorrow  - Total Time: 35 mins      - Cont IV Keppra  - MRI brain  - Swallow Eval  - STAT AB.45/35/80  - EEG ordered  - UA  - Blood Cx  - Empiric Abx for possible aspiration  - Discussed with nursing  - Total time: 50 mins    Admission Hx/HPI per Dr. Sergio Bose:  Dov Holcomb is  70 y.o. female  with history of diabetes, hypertension, hyperlipidemia with multiple CVA with residual weakness, resident of [address] [place] [address] assisted living [facility] found unresponsive near bathroom. According to information she ate her dinner, 15 minutes later they found her on the floor unresponsive. Not sure exactly what happened, called emergency crew. Patient is not responding to the commands but vital stable and did not notice any respiratory distress. Patient is brought to the emergency room, during transportation and at the facility she had an episode of vomitings. It was cleaned, did not show any signs of distress.      By the time she came to the hospital emergency room during transferring to the
Hospitalist Progress Note    Daily Progress Note: 6/20/2023 3:54 PM  Chief Presenting Complaint: altered, seizure  Assessment/Plan with MDM & Differential Considerations     # AMS d/t Seizure  # Multiple CVA recently  # MOOSE/Dehydration  # Hypomagnesemia  # DM-2 on insulin and orals  # Hx HTN  # Low grade fever; etiology? DVT Prophylaxis: On AC  Code Status: DNR     MDM/Plan & Disposition:  - Cont IV Keppra  - MRI brain  - Swallow Eval  - ABG  - EEG ordered  - UA  - Blood Cx  - Empiric Abx for possible aspiration  - Discussed with nursing  - Total time: 50 mins    Admission Hx/HPI per Dr. Rock Valladares:  Guicho Xavier is  70 y.o. female  with history of diabetes, hypertension, hyperlipidemia with multiple CVA with residual weakness, resident of [address] [place] [address] assisted living [facility] found unresponsive near bathroom. According to information she ate her dinner, 15 minutes later they found her on the floor unresponsive. Not sure exactly what happened, called emergency crew. Patient is not responding to the commands but vital stable and did not notice any respiratory distress. Patient is brought to the emergency room, during transportation and at the facility she had an episode of vomitings. It was cleaned, did not show any signs of distress. By the time she came to the hospital emergency room during transferring to the bed she started having seizure-like activity. It was witnessed,  with altered mental status which did not improve, suggest post ictal from seizures.   She is given IV Keppra, admitted for further management.  ----------------------------------------    Subjective: Very lethargic; no distress    Current Facility-Administered Medications   Medication Dose Route Frequency    [START ON 6/21/2023] metFORMIN (GLUCOPHAGE) tablet 500 mg  500 mg Oral BID     insulin glargine (LANTUS) injection vial 16 Units  16 Units SubCUTAneous Nightly    aspirin chewable tablet 81 mg  81 mg Oral Daily
PT eval order received and acknowledged. PT eval attempted at 1020 however pt going off the floor to MRI. Will continue to follow patient and attempt PT eval at a later time. Thank you.
Physician Progress Note      Myra Sahu  [facility] #:                  [id number]  :                       1945  ADMIT DATE:       2023 8:07 PM  [address] [place] DATE:  RESPONDING  PROVIDER #:        Elisha Hebert MD          QUERY TEXT:    Pt admitted with AMS. Pt noted to have UTI. If possible, please document in   the progress notes and discharge summary if you are evaluating and / or   treating any of the following: The medical record reflects the following:  Risk Factors: 70year old female, AMS, UTI, MOOSE, dehydration  Clinical Indicators:  Attending PN - Low grade fever, most likely d/t UTI   and probable cause for AMS and reactive seizure  U/A mod LE, turbid, yeast  Alert and responsive. Oriented. Treatment: IVF NS, IV Ceftriaxone, PO Diflucan, IV Keppra    Please email [email or web address] with any questions  Options provided:  -- Metabolic encephalopathy  -- Toxic encephalopathy  -- Toxic metabolic encephalopathy  -- Other - I will add my own diagnosis  -- Disagree - Not applicable / Not valid  -- Disagree - Clinically unable to determine / Unknown  -- Refer to Clinical Documentation Reviewer    PROVIDER RESPONSE TEXT:    This patient has metabolic encephalopathy. Query created by:  Ashish Oseguera on 2023 6:56 AM      Electronically signed by:  Elisha Hebert MD 2023 8:13 AM
Progress Note  Date:2023       Room:[facility]  Patient Name:Maureen Chaahl     YOB: 1945     Age:78 y.o. Subjective    Subjective Patient followed for leukocytosis with positive blood cultures presumed to be contaminated, but also Candida UTI, however, urine culture grew E. Coli and Klebsiella. Patient resting comfortably with no new complaints. .       Objective         Vitals Last 24 Hours:  TEMPERATURE:  Temp  Av.3 °F (36.8 °C)  Min: 97.9 °F (36.6 °C)  Max: 98.9 °F (37.2 °C)  RESPIRATIONS RANGE: Resp  Av  Min: 18  Max: 18  PULSE OXIMETRY RANGE: SpO2  Av %  Min: 93 %  Max: 100 %  PULSE RANGE: Pulse  Av.7  Min: 62  Max: 76  BLOOD PRESSURE RANGE: Systolic (81PXC), RBA:404 , Min:140 , VDK:472   ; Diastolic (82TOR), RIQ:33, Min:56, Max:71       Objective    Vitals and nursing note reviewed. Constitutional:       General: She is not in acute distress. Appearance: She is ill-appearing. HENT:      Head: Normocephalic and atraumatic. Right Ear: External ear normal.      Left Ear: External ear normal.      Nose: Nose normal.   Eyes:      Pupils: Pupils are equal, round, and reactive to light. Cardiovascular:      Rate and Rhythm: Normal rate and regular rhythm. Heart sounds: No murmur heard. Pulmonary:      Effort: Pulmonary effort is normal.      Breath sounds: Normal breath sounds. Abdominal:      General: Bowel sounds are normal. There is no distension. Palpations: Abdomen is soft. Tenderness: There is no abdominal tenderness. Genitourinary:     Comments: External urinary device  Musculoskeletal:      Cervical back: Neck supple. Right lower leg: No edema. Left lower leg: No edema. Skin:     Findings: No rash. Neurological:      General: No focal deficit present. Mental Status: She is alert. She is disoriented.    Psychiatric:         Behavior: Behavior normal.     Labs/Imaging/Diagnostics    Labs:  CBC:  Recent Labs
SOC report placed in chart
Absolute 1.2 0.8 - 3.5 K/UL    Monocytes Absolute 0.6 0.0 - 1.0 K/UL    Eosinophils Absolute 0.1 0.0 - 0.4 K/UL    Basophils Absolute 0.0 0.0 - 0.1 K/UL    Absolute Immature Granulocyte 0.0 0.00 - 0.04 K/UL    Differential Type AUTOMATED     C-Reactive Protein    Collection Time: 06/21/23  7:30 AM   Result Value Ref Range    CRP 2.89 (H) 0.00 - 0.60 mg/dL   Procalcitonin    Collection Time: 06/21/23  7:30 AM   Result Value Ref Range    Procalcitonin 0.05 (H) 0 ng/mL   Phosphorus    Collection Time: 06/21/23  7:30 AM   Result Value Ref Range    Phosphorus 2.6 2.6 - 4.7 mg/dL   Magnesium    Collection Time: 06/21/23  7:30 AM   Result Value Ref Range    Magnesium 1.9 1.6 - 2.4 mg/dL   POCT Glucose    Collection Time: 06/21/23  7:52 AM   Result Value Ref Range    POC Glucose 136 (H) 65 - 100 mg/dL    Performed by: RODDY NAZ    POCT Glucose    Collection Time: 06/21/23  4:41 PM   Result Value Ref Range    POC Glucose 135 (H) 65 - 100 mg/dL    Performed by: PEREYRA NAZ        CT Head W/O Contrast   Final Result   1. No evidence of acute infarct or intracranial hemorrhage. 2. Periventricular white matter disease is likely secondary to chronic small   vessel ischemic changes. 3. Multiple old left cerebral and cerebellar hemisphere infarctions. CT CSpine W/O Contrast   Final Result      1. No acute osseous abnormality. 2. Multilevel degenerative spondylosis. XR CHEST PORTABLE   Final Result   No evidence of acute cardiopulmonary process.           MRI BRAIN WO CONTRAST    (Results Pending)         ________________________________________  Mary Alice Rudolph MD

## 2023-06-23 NOTE — CARE COORDINATION
CM reviewed chart. Possible discharge back to [address] [place] [address] tomorrow pending ID clearance. Reached out to [facility] to discuss discharge.  transferred me to person in charge of admission, no answer, phone continued to ring. Will attempt to call back again and tray again. 1345: spoke with Jj Keita from [place] [facility]. Informed of possible discharge tomorrow. Per Joe Lovell she stated she needs to go to rehab before returning. Asked why when we have therapy notes stating she can return to [facility]. Jj Keita stated its there policy patient needs to go to SNF if out of building for more than 3 days. Again stated he insurance would not cover that due to recommendations to return to [facility]. Vahid Leal took my name and number and stated she would call me back after speaking with her director.

## 2023-06-24 VITALS
SYSTOLIC BLOOD PRESSURE: 140 MMHG | TEMPERATURE: 98.2 F | DIASTOLIC BLOOD PRESSURE: 76 MMHG | WEIGHT: 166.45 LBS | HEIGHT: 64 IN | HEART RATE: 79 BPM | OXYGEN SATURATION: 97 % | BODY MASS INDEX: 28.42 KG/M2 | RESPIRATION RATE: 17 BRPM

## 2023-06-24 LAB
BACTERIA SPEC CULT: ABNORMAL
BACTERIA SPEC CULT: NORMAL
CRP SERPL-MCNC: 4.51 MG/DL (ref 0–0.6)
GLUCOSE BLD STRIP.AUTO-MCNC: 100 MG/DL (ref 65–100)
GLUCOSE BLD STRIP.AUTO-MCNC: 65 MG/DL (ref 65–100)
GLUCOSE BLD STRIP.AUTO-MCNC: 78 MG/DL (ref 65–100)
Lab: ABNORMAL
Lab: ABNORMAL
Lab: NORMAL
PERFORMED BY:: NORMAL

## 2023-06-24 PROCEDURE — 86140 C-REACTIVE PROTEIN: CPT

## 2023-06-24 PROCEDURE — 82962 GLUCOSE BLOOD TEST: CPT

## 2023-06-24 PROCEDURE — 36415 COLL VENOUS BLD VENIPUNCTURE: CPT

## 2023-06-24 PROCEDURE — 6370000000 HC RX 637 (ALT 250 FOR IP): Performed by: HOSPITALIST

## 2023-06-24 PROCEDURE — 6370000000 HC RX 637 (ALT 250 FOR IP): Performed by: INTERNAL MEDICINE

## 2023-06-24 RX ORDER — LEVOFLOXACIN 750 MG/1
750 TABLET ORAL DAILY
Qty: 6 TABLET | Refills: 0 | Status: SHIPPED | OUTPATIENT
Start: 2023-06-24 | End: 2023-06-30

## 2023-06-24 RX ORDER — LEVETIRACETAM 500 MG/1
500 TABLET ORAL 2 TIMES DAILY
Qty: 60 TABLET | Refills: 1 | Status: SHIPPED | OUTPATIENT
Start: 2023-06-24

## 2023-06-24 RX ORDER — MAGNESIUM OXIDE 400 MG/1
400 TABLET ORAL DAILY
Qty: 7 TABLET | Refills: 0 | Status: SHIPPED | OUTPATIENT
Start: 2023-06-24

## 2023-06-24 RX ADMIN — GABAPENTIN 100 MG: 100 CAPSULE ORAL at 09:07

## 2023-06-24 RX ADMIN — CLOPIDOGREL BISULFATE 75 MG: 75 TABLET ORAL at 09:07

## 2023-06-24 RX ADMIN — FENOFIBRATE 54 MG: 54 TABLET ORAL at 08:50

## 2023-06-24 RX ADMIN — CYANOCOBALAMIN TAB 1000 MCG 1000 MCG: 1000 TAB at 08:49

## 2023-06-24 RX ADMIN — METFORMIN HYDROCHLORIDE 500 MG: 500 TABLET ORAL at 08:49

## 2023-06-24 RX ADMIN — METOPROLOL SUCCINATE 50 MG: 50 TABLET, EXTENDED RELEASE ORAL at 08:50

## 2023-06-24 RX ADMIN — ASPIRIN 81 MG: 81 TABLET, CHEWABLE ORAL at 08:50

## 2023-06-24 RX ADMIN — LEVETIRACETAM 500 MG: 250 TABLET, FILM COATED ORAL at 08:49

## 2023-06-24 RX ADMIN — Medication 5000 UNITS: at 08:49

## 2023-06-24 RX ADMIN — AMLODIPINE BESYLATE 5 MG: 5 TABLET ORAL at 08:50

## 2023-06-24 RX ADMIN — ATORVASTATIN CALCIUM 40 MG: 40 TABLET, FILM COATED ORAL at 08:49

## 2023-06-24 NOTE — DISCHARGE SUMMARY
injection vial  Commonly known as: HUMALOG     Janumet  MG per tablet  Generic drug: sitaGLIPtan-metFORMIN               Where to Get Your Medications        Information about where to get these medications is not yet available    Ask your nurse or doctor about these medications  levETIRAcetam 500 MG tablet  levoFLOXacin 750 MG tablet  magnesium oxide 400 MG tablet             Follow up Care    1. Rufino Barboza MD in 5-7 days    Patient Follow Up Instructions: Activity: activity as tolerated; fall precautions; seizure precautions    Diet:  cardiac/diabetic; aspiration precautions    Condition at Discharge:  Stable    Disposition  Assisted Living    Code Status:  DNR     Discharge Exam:  Patient seen and examined by me on discharge day. Constitutional: NAD; awake/alert     Neck: Supple      Cardiovascular: S1S2     Respiratory:  CTA ant bilat; vesicular breath sounds, no overt wheeze     GI: Soft, NT; no guarding/rigidity; + bowel sounds     Neurological:  No overt tremors; ltd exam; no new focal deficits     Psychiatric: Coherent, appropriate     Skin: No new rash or significant discoloration      Vascular: Palpable pulses;  No edema    CONSULTATIONS: ID    Significant Diagnostic Studies:   Recent Results (from the past 24 hour(s))   POCT Glucose    Collection Time: 06/23/23  4:03 PM   Result Value Ref Range    POC Glucose 158 (H) 65 - 100 mg/dL    Performed by: Christin Snow    POCT Glucose    Collection Time: 06/23/23  8:42 PM   Result Value Ref Range    POC Glucose 183 (H) 65 - 100 mg/dL    Performed by: Marisol Bautista    C-Reactive Protein    Collection Time: 06/24/23  6:35 AM   Result Value Ref Range    CRP 4.51 (H) 0.00 - 0.60 mg/dL   POCT Glucose    Collection Time: 06/24/23  8:37 AM   Result Value Ref Range    POC Glucose 65 65 - 100 mg/dL    Performed by: Antonino Samayoa    POCT Glucose    Collection Time: 06/24/23  9:12 AM   Result Value Ref Range    POC Glucose 78 65 - 100 mg/dL    Performed

## 2023-06-24 NOTE — DISCHARGE INSTR - COC
Continuity of Care Form    Patient Name: Guicho Xavier   :  1945  MRN:  [id number]    Admit date:  2023  Discharge date:  3128    Code Status Order: DNR   Advance Directives:     Admitting Physician:  Mark Hoskins MD  PCP: Hawa Simon MD    Discharging Nurse: [facility] Unit/Room#: 585/01  Discharging Unit Phone Number: [phone]    Emergency Contact:   Extended Emergency Contact Information  Primary Emergency Contact: Anurag Laura  Address: [address] [place] [address] [address] Phone: [phone]  Mobile Phone: [phone]  Relation: Other  Secondary Emergency Contact: [facility] Phone: [phone]  Mobile Phone: [phone]  Relation: Child    Past Surgical History:  Past Surgical History:   Procedure Laterality Date    BACK SURGERY      CARPAL TUNNEL RELEASE      CATARACT REMOVAL Bilateral     ORTHOPEDIC SURGERY      ankle       Immunization History: There is no immunization history on file for this patient.     Active Problems:  Patient Active Problem List   Diagnosis Code    Mixed hyperlipidemia E78.2    History of CVA (cerebrovascular accident) Z86.73    Diabetes mellitus ([address] E11.9    TIA (transient ischemic attack) G45.9    Slurred speech R47.81    Benign essential HTN I10    Osteoporosis M81.0    Vitamin D deficiency E55.9    History of colon polyps Z86.010    Macrocytosis without anemia D75.89    CVA (cerebral vascular accident) ([address] I63.9    Acute right-sided weakness R53.1    Altered mental status R41.82    Seizure ([address] R56.9       Isolation/Infection:   Isolation            Contact          Patient Infection Status       None to display            Nurse Assessment:  Last Vital Signs: /84   Pulse 85   Temp 98 °F (36.7 °C) (Axillary)   Resp 17   Ht 1.626 m (5' 4\")   Wt 75.5 kg (166 lb 7.2 oz)   SpO2 97%   BMI 28.57 kg/m²     Last documented pain score (0-10 scale):    Last

## 2023-06-24 NOTE — PROCEDURES
[facility]  EEG    Name:  Jose Gutiérrez  MR#:  [id number]  :  1945  ACCOUNT #:  [de-identified]  DATE OF SERVICE:  2023      DIAGNOSIS:  Mental status changes, seizure. DESCRIPTION:  Recording is done digitally on computer. Electrodes are placed in a 10-20 international system. Initial recording is equipment calibration, followed by biocalibration. Initial montages are bipolar montage. TECHNIQUE:  Tracings started out with the patient awake, eyes closed with well-formed bioccipital alpha rhythms in the 8 Hz frequency. As the recording continues, the patient is hooked up to an EKG machine that shows a heart rate of 96. Tracings continue with the patient being exposed to photic stimulation without any abnormality. Hyperventilation is not done. Towards the end, the patient becomes increasingly drowsy. IMPRESSION:  This is a normal EEG awake and drowsy. There is no seizure activity.       Aleah Womack MD      [id number]  D:  2023 13:46  T:  2023 22:30  JOB #:  [id number]

## 2023-06-24 NOTE — CARE COORDINATION
DC Plan: [facility]       Report: [phone] (ask for [address]       Transportation: stretcher - [facility] 2pm    Cm spoke with attending. Plan for discharge is today. Cm reviewed RN CM note for 6/23. Apparently, [facility] is supposed to get in touch with their director to see if pt is required to go to SNF prior to returning due to their policy. CM called [facility] this morning and questioned if pt is required to go to SNF prior to returning to [facility]. Per RN CM note, the recommendation is for pt to return to [facility]. CM spoke with Pema. Pema reports [facility] can [facility]. RX will need to be sent to [address] [place] [address]. Nurse will need to call report and request to speak with Pema. CM stopped by pt's room. Family was not present. Cm spoke with pt's son/TANVI Caraballo [phone]. Cm informed him of anticipated discharge back to [facility] today. Transportation was discussed. Son indicated if insurance will not approve transport, then he can take his mom back to [facility], however he will need someone to help him get his mother into the vehicle. Cm sent information to [facility] to review if insurance will approve or not. Cm spoke with [facility]. Everything is good to go. Cm can set up stretcher transport. Transport was set up for a 2pm . Cm updated attending. Cm spoke with pt's son - Lul Gorman and updated him on transportation. Cm discussed IMM. Son is agreeable with discharge. Cm met with pt at the bedside and informed her of discharge. CM left IMM at pt's bedside. Cm updated pt's nurse.      Transition of Care Plan:    RUR: 13%  Prior Level of Functioning: needs assistance  Disposition: [facility]  If SNF or IPR: Date FOC offered: N/A  Date FOC received: N/A  Accepting facility: N/A  Date authorization started with reference number: N/A  Date authorization received and expires: N/A  Follow up appointments: No  DME needed: None  Transportation at discharge: Stretcher  IM/IMM

## 2023-06-26 LAB
BACTERIA SPEC CULT: NORMAL
Lab: NORMAL

## 2023-09-30 ENCOUNTER — HOSPITAL ENCOUNTER (EMERGENCY)
Facility: HOSPITAL | Age: 78
Discharge: NURSING, REHAB OR LONG-TERM CARE | End: 2023-09-30
Attending: EMERGENCY MEDICINE
Payer: MEDICARE

## 2023-09-30 ENCOUNTER — APPOINTMENT (OUTPATIENT)
Facility: HOSPITAL | Age: 78
End: 2023-09-30
Payer: MEDICARE

## 2023-09-30 VITALS
BODY MASS INDEX: 30.73 KG/M2 | WEIGHT: 180 LBS | HEIGHT: 64 IN | RESPIRATION RATE: 14 BRPM | TEMPERATURE: 97.4 F | HEART RATE: 82 BPM | OXYGEN SATURATION: 95 % | DIASTOLIC BLOOD PRESSURE: 55 MMHG | SYSTOLIC BLOOD PRESSURE: 123 MMHG

## 2023-09-30 DIAGNOSIS — U07.1 COVID-19: Primary | ICD-10-CM

## 2023-09-30 LAB
ABO + RH BLD: NORMAL
ALBUMIN SERPL-MCNC: 3.7 G/DL (ref 3.5–5)
ALBUMIN/GLOB SERPL: 1.1 (ref 1.1–2.2)
ALP SERPL-CCNC: 64 U/L (ref 45–117)
ALT SERPL-CCNC: 12 U/L (ref 12–78)
ANION GAP SERPL CALC-SCNC: 8 MMOL/L (ref 5–15)
AST SERPL W P-5'-P-CCNC: 9 U/L (ref 15–37)
BASOPHILS # BLD: 0 K/UL (ref 0–0.1)
BASOPHILS NFR BLD: 0 % (ref 0–1)
BILIRUB SERPL-MCNC: 0.4 MG/DL (ref 0.2–1)
BLOOD GROUP ANTIBODIES SERPL: NEGATIVE
BUN SERPL-MCNC: 31 MG/DL (ref 6–20)
BUN/CREAT SERPL: 23 (ref 12–20)
CA-I BLD-MCNC: 9.5 MG/DL (ref 8.5–10.1)
CHLORIDE SERPL-SCNC: 105 MMOL/L (ref 97–108)
CO2 SERPL-SCNC: 29 MMOL/L (ref 21–32)
CREAT SERPL-MCNC: 1.35 MG/DL (ref 0.55–1.02)
DIFFERENTIAL METHOD BLD: ABNORMAL
EOSINOPHIL # BLD: 0 K/UL (ref 0–0.4)
EOSINOPHIL NFR BLD: 0 % (ref 0–7)
ERYTHROCYTE [DISTWIDTH] IN BLOOD BY AUTOMATED COUNT: 14 % (ref 11.5–14.5)
GLOBULIN SER CALC-MCNC: 3.3 G/DL (ref 2–4)
GLUCOSE SERPL-MCNC: 183 MG/DL (ref 65–100)
HCT VFR BLD AUTO: 34 % (ref 35–47)
HGB BLD-MCNC: 10.7 G/DL (ref 11.5–16)
IMM GRANULOCYTES # BLD AUTO: 0.1 K/UL (ref 0–0.04)
IMM GRANULOCYTES NFR BLD AUTO: 1 % (ref 0–0.5)
LIPASE SERPL-CCNC: 69 U/L (ref 73–393)
LYMPHOCYTES # BLD: 0.5 K/UL (ref 0.8–3.5)
LYMPHOCYTES NFR BLD: 5 % (ref 12–49)
MCH RBC QN AUTO: 27.6 PG (ref 26–34)
MCHC RBC AUTO-ENTMCNC: 31.5 G/DL (ref 30–36.5)
MCV RBC AUTO: 87.9 FL (ref 80–99)
MONOCYTES # BLD: 0.8 K/UL (ref 0–1)
MONOCYTES NFR BLD: 8 % (ref 5–13)
NEUTS SEG # BLD: 8.4 K/UL (ref 1.8–8)
NEUTS SEG NFR BLD: 86 % (ref 32–75)
NRBC # BLD: 0 K/UL (ref 0–0.01)
NRBC BLD-RTO: 0 PER 100 WBC
PLATELET # BLD AUTO: 292 K/UL (ref 150–400)
PMV BLD AUTO: 10 FL (ref 8.9–12.9)
POTASSIUM SERPL-SCNC: 4.3 MMOL/L (ref 3.5–5.1)
PROT SERPL-MCNC: 7 G/DL (ref 6.4–8.2)
RBC # BLD AUTO: 3.87 M/UL (ref 3.8–5.2)
SARS-COV-2 RDRP RESP QL NAA+PROBE: DETECTED
SODIUM SERPL-SCNC: 142 MMOL/L (ref 136–145)
SPECIMEN EXP DATE BLD: NORMAL
TROPONIN I SERPL HS-MCNC: 9 NG/L (ref 0–51)
WBC # BLD AUTO: 9.8 K/UL (ref 3.6–11)

## 2023-09-30 PROCEDURE — 86900 BLOOD TYPING SEROLOGIC ABO: CPT

## 2023-09-30 PROCEDURE — 83690 ASSAY OF LIPASE: CPT

## 2023-09-30 PROCEDURE — 93005 ELECTROCARDIOGRAM TRACING: CPT | Performed by: EMERGENCY MEDICINE

## 2023-09-30 PROCEDURE — 99284 EMERGENCY DEPT VISIT MOD MDM: CPT

## 2023-09-30 PROCEDURE — 84484 ASSAY OF TROPONIN QUANT: CPT

## 2023-09-30 PROCEDURE — 86850 RBC ANTIBODY SCREEN: CPT

## 2023-09-30 PROCEDURE — 85025 COMPLETE CBC W/AUTO DIFF WBC: CPT

## 2023-09-30 PROCEDURE — 87635 SARS-COV-2 COVID-19 AMP PRB: CPT

## 2023-09-30 PROCEDURE — 96374 THER/PROPH/DIAG INJ IV PUSH: CPT

## 2023-09-30 PROCEDURE — 86901 BLOOD TYPING SEROLOGIC RH(D): CPT

## 2023-09-30 PROCEDURE — 70450 CT HEAD/BRAIN W/O DYE: CPT

## 2023-09-30 PROCEDURE — 80053 COMPREHEN METABOLIC PANEL: CPT

## 2023-09-30 PROCEDURE — 36415 COLL VENOUS BLD VENIPUNCTURE: CPT

## 2023-09-30 PROCEDURE — 6360000002 HC RX W HCPCS: Performed by: EMERGENCY MEDICINE

## 2023-09-30 RX ORDER — ONDANSETRON 2 MG/ML
4 INJECTION INTRAMUSCULAR; INTRAVENOUS ONCE
Status: COMPLETED | OUTPATIENT
Start: 2023-09-30 | End: 2023-09-30

## 2023-09-30 RX ADMIN — ONDANSETRON 4 MG: 2 INJECTION INTRAMUSCULAR; INTRAVENOUS at 12:19

## 2023-09-30 ASSESSMENT — PAIN SCALES - GENERAL: PAINLEVEL_OUTOF10: 0

## 2023-09-30 ASSESSMENT — PAIN - FUNCTIONAL ASSESSMENT: PAIN_FUNCTIONAL_ASSESSMENT: 0-10

## 2023-09-30 ASSESSMENT — LIFESTYLE VARIABLES
HOW MANY STANDARD DRINKS CONTAINING ALCOHOL DO YOU HAVE ON A TYPICAL DAY: PATIENT DOES NOT DRINK
HOW OFTEN DO YOU HAVE A DRINK CONTAINING ALCOHOL: NEVER

## 2023-09-30 NOTE — DISCHARGE INSTRUCTIONS
Thank you! Thank you for allowing me to care for you in the emergency department. It is my goal to provide you with excellent care. If you have not received excellent quality care, please ask to speak to the nurse manager. Please fill out the survey that will come to you by mail or email since we listen to your feedback! Below you will find a list of your tests from today's visit. Should you have any questions, please do not hesitate to call the emergency department.     Labs  Recent Results (from the past 12 hour(s))   CBC with Diff    Collection Time: 09/30/23 11:51 AM   Result Value Ref Range    WBC 9.8 3.6 - 11.0 K/uL    RBC 3.87 3.80 - 5.20 M/uL    Hemoglobin 10.7 (L) 11.5 - 16.0 g/dL    Hematocrit 34.0 (L) 35.0 - 47.0 %    MCV 87.9 80.0 - 99.0 FL    MCH 27.6 26.0 - 34.0 PG    MCHC 31.5 30.0 - 36.5 g/dL    RDW 14.0 11.5 - 14.5 %    Platelets 873 084 - 505 K/uL    MPV 10.0 8.9 - 12.9 FL    Nucleated RBCs 0.0 0.0  WBC    nRBC 0.00 0.00 - 0.01 K/uL    Neutrophils % 86 (H) 32 - 75 %    Lymphocytes % 5 (L) 12 - 49 %    Monocytes % 8 5 - 13 %    Eosinophils % 0 0 - 7 %    Basophils % 0 0 - 1 %    Immature Granulocytes 1 (H) 0 - 0.5 %    Neutrophils Absolute 8.4 (H) 1.8 - 8.0 K/UL    Lymphocytes Absolute 0.5 (L) 0.8 - 3.5 K/UL    Monocytes Absolute 0.8 0.0 - 1.0 K/UL    Eosinophils Absolute 0.0 0.0 - 0.4 K/UL    Basophils Absolute 0.0 0.0 - 0.1 K/UL    Absolute Immature Granulocyte 0.1 (H) 0.00 - 0.04 K/UL    Differential Type AUTOMATED     CMP    Collection Time: 09/30/23 11:51 AM   Result Value Ref Range    Sodium 142 136 - 145 mmol/L    Potassium 4.3 3.5 - 5.1 mmol/L    Chloride 105 97 - 108 mmol/L    CO2 29 21 - 32 mmol/L    Anion Gap 8 5 - 15 mmol/L    Glucose 183 (H) 65 - 100 mg/dL    BUN 31 (H) 6 - 20 mg/dL    Creatinine 1.35 (H) 0.55 - 1.02 mg/dL    Bun/Cre Ratio 23 (H) 12 - 20      Est, Glom Filt Rate 41 (L) >60 ml/min/1.73m2    Calcium 9.5 8.5 - 10.1 mg/dL    Total Bilirubin 0.4 0.2 - 1.0 mg/dL    AST 9 (L) 15 - 37 U/L    ALT 12 12 - 78 U/L    Alk Phosphatase 64 45 - 117 U/L    Total Protein 7.0 6.4 - 8.2 g/dL    Albumin 3.7 3.5 - 5.0 g/dL    Globulin 3.3 2.0 - 4.0 g/dL    Albumin/Globulin Ratio 1.1 1.1 - 2.2     Lipase    Collection Time: 09/30/23 11:51 AM   Result Value Ref Range    Lipase 69 (L) 73 - 393 U/L   Troponin    Collection Time: 09/30/23 11:51 AM   Result Value Ref Range    Troponin, High Sensitivity 9 0 - 51 ng/L   TYPE AND SCREEN    Collection Time: 09/30/23 11:51 AM   Result Value Ref Range    Crossmatch expiration date 10/03/2023,2359     ABO/Rh A Positive     Antibody Screen Negative    COVID-19, Rapid    Collection Time: 09/30/23 12:58 PM    Specimen: Nasopharyngeal   Result Value Ref Range    SARS-CoV-2, Rapid DETECTED (A) Not Detected         Radiologic Studies  CT HEAD WO CONTRAST   Final Result         No change or acute abnormality        ------------------------------------------------------------------------------------------------------------  The exam and treatment you received in the Emergency Department were for an urgent problem and are not intended as complete care. It is important that you follow-up with a doctor, nurse practitioner, or physician assistant to:  (1) confirm your diagnosis,  (2) re-evaluation of changes in your illness and treatment, and  (3) for ongoing care. Please take your discharge instructions with you when you go to your follow-up appointment. If you have any problem arranging a follow-up appointment, contact the Emergency Department. If your symptoms become worse or you do not improve as expected and you are unable to reach your health care provider, please return to the Emergency Department. We are available 24 hours a day. If a prescription has been provided, please have it filled as soon as possible to prevent a delay in treatment.  If you have any questions or reservations about taking the medication due to side effects or

## 2023-09-30 NOTE — ED NOTES
Report called to [facility] at [address] at this time     Ciaran Lang RN  09/30/23 [id number]

## 2023-09-30 NOTE — ED PROVIDER NOTES
[facility] EMERGENCY DEPT  EMERGENCY DEPARTMENT HISTORY AND PHYSICAL EXAM      Date: 9/30/2023  Patient Name: Saji Alaniz  MRN: [id number]  [address] [facility] 1945  Date of evaluation: 9/30/2023  Provider: Chyna Aguillon MD   Note Started: 1:58 PM EDT 9/30/23    HISTORY OF PRESENT ILLNESS   No chief complaint on file. History Provided By: Patient and EMS    HPI: Saji Alaniz is a 70 y.o. female presents to the emergency department via EMS with complaint of \"leaning to the left\" per the nursing home where the patient resides. History of stroke with resultant right-sided weakness and slightly slurred speech. Patient reportedly had a fall a few days ago with resultant right black eye. Patient denies any left-sided weakness. PAST MEDICAL HISTORY   Past Medical History:  Past Medical History:   Diagnosis Date    Diabetes ([address])     Dysphagia     Hypercholesterolemia     Hypertension     Osteoporosis     Stroke [facility])        Past Surgical History:  Past Surgical History:   Procedure Laterality Date    BACK SURGERY      CARPAL TUNNEL RELEASE      CATARACT REMOVAL Bilateral     ORTHOPEDIC SURGERY      ankle       Family History:  Family History   Problem Relation Age of Onset    Heart Surgery Father     Hypertension Mother        Social History:  Social History     Tobacco Use    Smoking status: Never    Smokeless tobacco: Never   Substance Use Topics    Alcohol use: Not Currently    Drug use: Never       Allergies:  No Known Allergies    PCP: Dayana Baca MD    Current Meds:   No current facility-administered medications for this encounter. Current Outpatient Medications   Medication Sig Dispense Refill    nirmatrelvir/ritonavir 300/100 (PAXLOVID) 20 x 150 MG & 10 x 100MG TBPK Take 3 tablets (two 150 mg nirmatrelvir and one 100 mg ritonavir tablets) by mouth every 12 hours for 5 days.  30 tablet 0    levETIRAcetam (KEPPRA) 500 MG tablet Take 1 tablet by mouth 2 times daily 60 tablet 1    magnesium AUTOMATED     CMP    Collection Time: 09/30/23 11:51 AM   Result Value Ref Range    Sodium 142 136 - 145 mmol/L    Potassium 4.3 3.5 - 5.1 mmol/L    Chloride 105 97 - 108 mmol/L    CO2 29 21 - 32 mmol/L    Anion Gap 8 5 - 15 mmol/L    Glucose 183 (H) 65 - 100 mg/dL    BUN 31 (H) 6 - 20 mg/dL    Creatinine 1.35 (H) 0.55 - 1.02 mg/dL    Bun/Cre Ratio 23 (H) 12 - 20      Est, Glom Filt Rate 41 (L) >60 ml/min/1.73m2    Calcium 9.5 8.5 - 10.1 mg/dL    Total Bilirubin 0.4 0.2 - 1.0 mg/dL    AST 9 (L) 15 - 37 U/L    ALT 12 12 - 78 U/L    Alk Phosphatase 64 45 - 117 U/L    Total Protein 7.0 6.4 - 8.2 g/dL    Albumin 3.7 3.5 - 5.0 g/dL    Globulin 3.3 2.0 - 4.0 g/dL    Albumin/Globulin Ratio 1.1 1.1 - 2.2     Lipase    Collection Time: 09/30/23 11:51 AM   Result Value Ref Range    Lipase 69 (L) 73 - 393 U/L   Troponin    Collection Time: 09/30/23 11:51 AM   Result Value Ref Range    Troponin, High Sensitivity 9 0 - 51 ng/L   TYPE AND SCREEN    Collection Time: 09/30/23 11:51 AM   Result Value Ref Range    Crossmatch expiration date 10/03/2023,2359     ABO/Rh A Positive     Antibody Screen Negative    COVID-19, Rapid    Collection Time: 09/30/23 12:58 PM    Specimen: Nasopharyngeal   Result Value Ref Range    SARS-CoV-2, Rapid DETECTED (A) Not Detected         EKG:.EKG interpreted by me. Shows Normal Sinus Rhythm with a HR of 80 bpm.  Sinus arrhythmia noted. Normal axes and intervals. Normal EKG. No STEMI. Radiologic Studies:  Non-plain film images such as CT, Ultrasound and MRI are read by the radiologist. Plain radiographic images are visualized and preliminarily interpreted by the ED Provider with the following findings: Not Applicable.     Interpretation per the Radiologist below, if available at the time of this note:  CT HEAD WO CONTRAST   Final Result         No change or acute abnormality           EMERGENCY DEPARTMENT COURSE and DIFFERENTIAL DIAGNOSIS/MDM   CC/HPI Summary, DDx, ED Course, and Reassessment:

## 2023-09-30 NOTE — ED TRIAGE NOTES
Pt is from [facility] where they called stating that pt was leaning to the left and they were concerned pt had another stroke; pt sustained a fall a few days ago; pt's LKW 0971; unsure of pt's baseline as the staff their stated that they are part time

## 2023-10-01 LAB
EKG ATRIAL RATE: 80 BPM
EKG DIAGNOSIS: NORMAL
EKG P AXIS: 32 DEGREES
EKG P-R INTERVAL: 150 MS
EKG Q-T INTERVAL: 396 MS
EKG QRS DURATION: 88 MS
EKG QTC CALCULATION (BAZETT): 456 MS
EKG R AXIS: -16 DEGREES
EKG T AXIS: 8 DEGREES
EKG VENTRICULAR RATE: 80 BPM

## 2024-01-07 ENCOUNTER — HOSPITAL ENCOUNTER (INPATIENT)
Facility: HOSPITAL | Age: 79
LOS: 3 days | Discharge: NURSING, REHAB OR LONG-TERM CARE | End: 2024-01-11
Attending: STUDENT IN AN ORGANIZED HEALTH CARE EDUCATION/TRAINING PROGRAM | Admitting: INTERNAL MEDICINE
Payer: MEDICARE

## 2024-01-07 ENCOUNTER — APPOINTMENT (OUTPATIENT)
Facility: HOSPITAL | Age: 79
End: 2024-01-07
Payer: MEDICARE

## 2024-01-07 DIAGNOSIS — K92.1 MELENA: ICD-10-CM

## 2024-01-07 DIAGNOSIS — N28.9 RENAL IMPAIRMENT: ICD-10-CM

## 2024-01-07 DIAGNOSIS — R53.1 GENERALIZED WEAKNESS: Primary | ICD-10-CM

## 2024-01-07 LAB
ABO + RH BLD: NORMAL
ALBUMIN SERPL-MCNC: 3.1 G/DL (ref 3.5–5)
ALBUMIN/GLOB SERPL: 1 (ref 1.1–2.2)
ALP SERPL-CCNC: 55 U/L (ref 45–117)
ALT SERPL-CCNC: 14 U/L (ref 12–78)
ANION GAP SERPL CALC-SCNC: 16 MMOL/L (ref 5–15)
AST SERPL W P-5'-P-CCNC: 16 U/L (ref 15–37)
BASE DEFICIT BLD-SCNC: 8.7 MMOL/L
BASOPHILS # BLD: 0 K/UL (ref 0–0.1)
BASOPHILS NFR BLD: 0 % (ref 0–1)
BILIRUB SERPL-MCNC: 0.4 MG/DL (ref 0.2–1)
BLOOD GROUP ANTIBODIES SERPL: NEGATIVE
BUN SERPL-MCNC: 55 MG/DL (ref 6–20)
BUN/CREAT SERPL: 42 (ref 12–20)
CA-I BLD-MCNC: 1.15 MMOL/L (ref 1.12–1.32)
CA-I BLD-MCNC: 8.6 MG/DL (ref 8.5–10.1)
CHLORIDE BLD-SCNC: 113 MMOL/L (ref 98–107)
CHLORIDE SERPL-SCNC: 109 MMOL/L (ref 97–108)
CO2 BLD-SCNC: 17 MMOL/L
CO2 SERPL-SCNC: 18 MMOL/L (ref 21–32)
COLLECT DATE STL: 924
CREAT SERPL-MCNC: 1.32 MG/DL (ref 0.55–1.02)
CREAT UR-MCNC: 1.14 MG/DL (ref 0.6–1.3)
DIFFERENTIAL METHOD BLD: ABNORMAL
EOSINOPHIL # BLD: 0 K/UL (ref 0–0.4)
EOSINOPHIL NFR BLD: 0 % (ref 0–7)
ERYTHROCYTE [DISTWIDTH] IN BLOOD BY AUTOMATED COUNT: 13.7 % (ref 11.5–14.5)
GLOBULIN SER CALC-MCNC: 3.1 G/DL (ref 2–4)
GLUCOSE BLD STRIP.AUTO-MCNC: 252 MG/DL (ref 65–100)
GLUCOSE SERPL-MCNC: 252 MG/DL (ref 65–100)
HCO3 BLD-SCNC: 17.2 MMOL/L (ref 19–28)
HCT VFR BLD AUTO: 33.3 % (ref 35–47)
HEMOCCULT SP1 STL QL: NEGATIVE
HGB BLD-MCNC: 10.3 G/DL (ref 11.5–16)
IMM GRANULOCYTES # BLD AUTO: 0 K/UL (ref 0–0.04)
IMM GRANULOCYTES NFR BLD AUTO: 0 % (ref 0–0.5)
INR PPP: 1.1 (ref 0.9–1.1)
LACTATE BLD-SCNC: 1.2 MMOL/L (ref 0.4–2)
LYMPHOCYTES # BLD: 0.5 K/UL (ref 0.8–3.5)
LYMPHOCYTES NFR BLD: 4 % (ref 12–49)
MCH RBC QN AUTO: 28.2 PG (ref 26–34)
MCHC RBC AUTO-ENTMCNC: 30.9 G/DL (ref 30–36.5)
MCV RBC AUTO: 91.2 FL (ref 80–99)
MONOCYTES # BLD: 0.5 K/UL (ref 0–1)
MONOCYTES NFR BLD: 5 % (ref 5–13)
NEUTS SEG # BLD: 9.8 K/UL (ref 1.8–8)
NEUTS SEG NFR BLD: 91 % (ref 32–75)
NRBC # BLD: 0 K/UL (ref 0–0.01)
NRBC BLD-RTO: 0 PER 100 WBC
PCO2 BLD: 36 MMHG (ref 35–45)
PERFORMED BY:: ABNORMAL
PH BLD: 7.29 (ref 7.35–7.45)
PLATELET # BLD AUTO: 365 K/UL (ref 150–400)
PMV BLD AUTO: 10.7 FL (ref 8.9–12.9)
PO2 BLD: 39 MMHG (ref 75–100)
POTASSIUM BLD-SCNC: 3.7 MMOL/L (ref 3.5–5.5)
POTASSIUM SERPL-SCNC: 3.8 MMOL/L (ref 3.5–5.1)
PROT SERPL-MCNC: 6.2 G/DL (ref 6.4–8.2)
PROTHROMBIN TIME: 14.7 SEC (ref 11.9–14.6)
RBC # BLD AUTO: 3.65 M/UL (ref 3.8–5.2)
SAO2 % BLD: 68 %
SODIUM BLD-SCNC: 140 MMOL/L (ref 136–145)
SODIUM SERPL-SCNC: 143 MMOL/L (ref 136–145)
SPECIMEN EXP DATE BLD: NORMAL
SPECIMEN SITE: ABNORMAL
WBC # BLD AUTO: 10.8 K/UL (ref 3.6–11)

## 2024-01-07 PROCEDURE — 83605 ASSAY OF LACTIC ACID: CPT

## 2024-01-07 PROCEDURE — 86850 RBC ANTIBODY SCREEN: CPT

## 2024-01-07 PROCEDURE — 82272 OCCULT BLD FECES 1-3 TESTS: CPT

## 2024-01-07 PROCEDURE — 83540 ASSAY OF IRON: CPT

## 2024-01-07 PROCEDURE — 82330 ASSAY OF CALCIUM: CPT

## 2024-01-07 PROCEDURE — 36415 COLL VENOUS BLD VENIPUNCTURE: CPT

## 2024-01-07 PROCEDURE — 84132 ASSAY OF SERUM POTASSIUM: CPT

## 2024-01-07 PROCEDURE — 85025 COMPLETE CBC W/AUTO DIFF WBC: CPT

## 2024-01-07 PROCEDURE — 80053 COMPREHEN METABOLIC PANEL: CPT

## 2024-01-07 PROCEDURE — 87635 SARS-COV-2 COVID-19 AMP PRB: CPT

## 2024-01-07 PROCEDURE — 85610 PROTHROMBIN TIME: CPT

## 2024-01-07 PROCEDURE — 71045 X-RAY EXAM CHEST 1 VIEW: CPT

## 2024-01-07 PROCEDURE — 82607 VITAMIN B-12: CPT

## 2024-01-07 PROCEDURE — 86901 BLOOD TYPING SEROLOGIC RH(D): CPT

## 2024-01-07 PROCEDURE — 99285 EMERGENCY DEPT VISIT HI MDM: CPT

## 2024-01-07 PROCEDURE — 82728 ASSAY OF FERRITIN: CPT

## 2024-01-07 PROCEDURE — 93005 ELECTROCARDIOGRAM TRACING: CPT | Performed by: STUDENT IN AN ORGANIZED HEALTH CARE EDUCATION/TRAINING PROGRAM

## 2024-01-07 PROCEDURE — 84295 ASSAY OF SERUM SODIUM: CPT

## 2024-01-07 PROCEDURE — 82947 ASSAY GLUCOSE BLOOD QUANT: CPT

## 2024-01-07 PROCEDURE — 2580000003 HC RX 258: Performed by: STUDENT IN AN ORGANIZED HEALTH CARE EDUCATION/TRAINING PROGRAM

## 2024-01-07 PROCEDURE — 86900 BLOOD TYPING SEROLOGIC ABO: CPT

## 2024-01-07 PROCEDURE — 82746 ASSAY OF FOLIC ACID SERUM: CPT

## 2024-01-07 PROCEDURE — 82803 BLOOD GASES ANY COMBINATION: CPT

## 2024-01-07 RX ORDER — 0.9 % SODIUM CHLORIDE 0.9 %
500 INTRAVENOUS SOLUTION INTRAVENOUS ONCE
Status: COMPLETED | OUTPATIENT
Start: 2024-01-07 | End: 2024-01-08

## 2024-01-07 RX ADMIN — SODIUM CHLORIDE 500 ML: 9 INJECTION, SOLUTION INTRAVENOUS at 23:52

## 2024-01-07 ASSESSMENT — PAIN - FUNCTIONAL ASSESSMENT: PAIN_FUNCTIONAL_ASSESSMENT: NONE - DENIES PAIN

## 2024-01-08 PROBLEM — R53.1 GENERALIZED WEAKNESS: Status: ACTIVE | Noted: 2024-01-08

## 2024-01-08 LAB
ANION GAP SERPL CALC-SCNC: 14 MMOL/L (ref 5–15)
B-OH-BUTYR SERPL-SCNC: 3.49 MMOL/L
BASOPHILS # BLD: 0 K/UL (ref 0–0.1)
BASOPHILS NFR BLD: 0 % (ref 0–1)
BUN SERPL-MCNC: 65 MG/DL (ref 6–20)
BUN/CREAT SERPL: 44 (ref 12–20)
CA-I BLD-MCNC: 8.6 MG/DL (ref 8.5–10.1)
CHLORIDE SERPL-SCNC: 113 MMOL/L (ref 97–108)
CO2 SERPL-SCNC: 16 MMOL/L (ref 21–32)
CREAT SERPL-MCNC: 1.49 MG/DL (ref 0.55–1.02)
DIFFERENTIAL METHOD BLD: ABNORMAL
EOSINOPHIL # BLD: 0 K/UL (ref 0–0.4)
EOSINOPHIL NFR BLD: 0 % (ref 0–7)
ERYTHROCYTE [DISTWIDTH] IN BLOOD BY AUTOMATED COUNT: 13.7 % (ref 11.5–14.5)
FERRITIN SERPL-MCNC: 116 NG/ML (ref 8–252)
FOLATE SERPL-MCNC: 12.1 NG/ML (ref 5–21)
GLUCOSE BLD STRIP.AUTO-MCNC: 154 MG/DL (ref 65–100)
GLUCOSE BLD STRIP.AUTO-MCNC: 247 MG/DL (ref 65–100)
GLUCOSE BLD STRIP.AUTO-MCNC: 293 MG/DL (ref 65–100)
GLUCOSE SERPL-MCNC: 290 MG/DL (ref 65–100)
HCT VFR BLD AUTO: 29.4 % (ref 35–47)
HCT VFR BLD AUTO: 34.5 % (ref 35–47)
HGB BLD-MCNC: 10.4 G/DL (ref 11.5–16)
HGB BLD-MCNC: 9.1 G/DL (ref 11.5–16)
IMM GRANULOCYTES # BLD AUTO: 0 K/UL (ref 0–0.04)
IMM GRANULOCYTES NFR BLD AUTO: 0 % (ref 0–0.5)
IRON SATN MFR SERPL: 9 % (ref 20–50)
IRON SERPL-MCNC: 25 UG/DL (ref 35–150)
LACTATE SERPL-SCNC: 1.7 MMOL/L (ref 0.4–2)
LYMPHOCYTES # BLD: 0.6 K/UL (ref 0.8–3.5)
LYMPHOCYTES NFR BLD: 8 % (ref 12–49)
MCH RBC QN AUTO: 28 PG (ref 26–34)
MCHC RBC AUTO-ENTMCNC: 30.1 G/DL (ref 30–36.5)
MCV RBC AUTO: 92.7 FL (ref 80–99)
MONOCYTES # BLD: 0.6 K/UL (ref 0–1)
MONOCYTES NFR BLD: 9 % (ref 5–13)
NEUTS SEG # BLD: 5.7 K/UL (ref 1.8–8)
NEUTS SEG NFR BLD: 83 % (ref 32–75)
NRBC # BLD: 0 K/UL (ref 0–0.01)
NRBC BLD-RTO: 0 PER 100 WBC
PERFORMED BY:: ABNORMAL
PLATELET # BLD AUTO: 201 K/UL (ref 150–400)
PMV BLD AUTO: 10.5 FL (ref 8.9–12.9)
POTASSIUM SERPL-SCNC: 4 MMOL/L (ref 3.5–5.1)
RBC # BLD AUTO: 3.72 M/UL (ref 3.8–5.2)
SARS-COV-2 RDRP RESP QL NAA+PROBE: NOT DETECTED
SODIUM SERPL-SCNC: 143 MMOL/L (ref 136–145)
TIBC SERPL-MCNC: 282 UG/DL (ref 250–450)
VIT B12 SERPL-MCNC: 374 PG/ML (ref 193–986)
WBC # BLD AUTO: 7 K/UL (ref 3.6–11)

## 2024-01-08 PROCEDURE — 6370000000 HC RX 637 (ALT 250 FOR IP): Performed by: INTERNAL MEDICINE

## 2024-01-08 PROCEDURE — 82010 KETONE BODYS QUAN: CPT

## 2024-01-08 PROCEDURE — 82962 GLUCOSE BLOOD TEST: CPT

## 2024-01-08 PROCEDURE — C9113 INJ PANTOPRAZOLE SODIUM, VIA: HCPCS | Performed by: INTERNAL MEDICINE

## 2024-01-08 PROCEDURE — 6360000002 HC RX W HCPCS: Performed by: INTERNAL MEDICINE

## 2024-01-08 PROCEDURE — 2580000003 HC RX 258: Performed by: INTERNAL MEDICINE

## 2024-01-08 PROCEDURE — 85018 HEMOGLOBIN: CPT

## 2024-01-08 PROCEDURE — 0DJ08ZZ INSPECTION OF UPPER INTESTINAL TRACT, VIA NATURAL OR ARTIFICIAL OPENING ENDOSCOPIC: ICD-10-PCS | Performed by: INTERNAL MEDICINE

## 2024-01-08 PROCEDURE — 85025 COMPLETE CBC W/AUTO DIFF WBC: CPT

## 2024-01-08 PROCEDURE — 36415 COLL VENOUS BLD VENIPUNCTURE: CPT

## 2024-01-08 PROCEDURE — 80048 BASIC METABOLIC PNL TOTAL CA: CPT

## 2024-01-08 PROCEDURE — 83605 ASSAY OF LACTIC ACID: CPT

## 2024-01-08 PROCEDURE — 1100000000 HC RM PRIVATE

## 2024-01-08 PROCEDURE — 85014 HEMATOCRIT: CPT

## 2024-01-08 RX ORDER — INSULIN GLARGINE 100 [IU]/ML
16 INJECTION, SOLUTION SUBCUTANEOUS NIGHTLY
Status: DISCONTINUED | OUTPATIENT
Start: 2024-01-08 | End: 2024-01-11 | Stop reason: HOSPADM

## 2024-01-08 RX ORDER — ACETAMINOPHEN 650 MG/1
650 SUPPOSITORY RECTAL EVERY 6 HOURS PRN
Status: DISCONTINUED | OUTPATIENT
Start: 2024-01-08 | End: 2024-01-11 | Stop reason: HOSPADM

## 2024-01-08 RX ORDER — SODIUM CHLORIDE 0.9 % (FLUSH) 0.9 %
5-40 SYRINGE (ML) INJECTION EVERY 12 HOURS SCHEDULED
Status: DISCONTINUED | OUTPATIENT
Start: 2024-01-08 | End: 2024-01-11 | Stop reason: HOSPADM

## 2024-01-08 RX ORDER — ATORVASTATIN CALCIUM 40 MG/1
40 TABLET, FILM COATED ORAL DAILY
Status: DISCONTINUED | OUTPATIENT
Start: 2024-01-08 | End: 2024-01-11 | Stop reason: HOSPADM

## 2024-01-08 RX ORDER — ASPIRIN 81 MG/1
81 TABLET, CHEWABLE ORAL DAILY
Status: DISCONTINUED | OUTPATIENT
Start: 2024-01-08 | End: 2024-01-10

## 2024-01-08 RX ORDER — SODIUM CHLORIDE 0.9 % (FLUSH) 0.9 %
5-40 SYRINGE (ML) INJECTION PRN
Status: DISCONTINUED | OUTPATIENT
Start: 2024-01-08 | End: 2024-01-11 | Stop reason: HOSPADM

## 2024-01-08 RX ORDER — FENOFIBRATE 54 MG/1
54 TABLET ORAL DAILY
Status: DISCONTINUED | OUTPATIENT
Start: 2024-01-08 | End: 2024-01-11 | Stop reason: HOSPADM

## 2024-01-08 RX ORDER — LANOLIN ALCOHOL/MO/W.PET/CERES
1000 CREAM (GRAM) TOPICAL DAILY
Status: DISCONTINUED | OUTPATIENT
Start: 2024-01-08 | End: 2024-01-11 | Stop reason: HOSPADM

## 2024-01-08 RX ORDER — POLYETHYLENE GLYCOL 3350 17 G/17G
17 POWDER, FOR SOLUTION ORAL DAILY PRN
Status: DISCONTINUED | OUTPATIENT
Start: 2024-01-08 | End: 2024-01-11 | Stop reason: HOSPADM

## 2024-01-08 RX ORDER — INSULIN LISPRO 100 [IU]/ML
0-4 INJECTION, SOLUTION INTRAVENOUS; SUBCUTANEOUS
Status: DISCONTINUED | OUTPATIENT
Start: 2024-01-08 | End: 2024-01-11 | Stop reason: HOSPADM

## 2024-01-08 RX ORDER — POTASSIUM CHLORIDE 7.45 MG/ML
10 INJECTION INTRAVENOUS PRN
Status: DISCONTINUED | OUTPATIENT
Start: 2024-01-08 | End: 2024-01-09

## 2024-01-08 RX ORDER — SODIUM CHLORIDE 9 MG/ML
INJECTION, SOLUTION INTRAVENOUS PRN
Status: DISCONTINUED | OUTPATIENT
Start: 2024-01-08 | End: 2024-01-11 | Stop reason: HOSPADM

## 2024-01-08 RX ORDER — ONDANSETRON 4 MG/1
4 TABLET, ORALLY DISINTEGRATING ORAL EVERY 8 HOURS PRN
Status: DISCONTINUED | OUTPATIENT
Start: 2024-01-08 | End: 2024-01-11 | Stop reason: HOSPADM

## 2024-01-08 RX ORDER — POTASSIUM CHLORIDE 20 MEQ/1
40 TABLET, EXTENDED RELEASE ORAL PRN
Status: DISCONTINUED | OUTPATIENT
Start: 2024-01-08 | End: 2024-01-09

## 2024-01-08 RX ORDER — AMLODIPINE BESYLATE 5 MG/1
5 TABLET ORAL DAILY
Status: DISCONTINUED | OUTPATIENT
Start: 2024-01-08 | End: 2024-01-11 | Stop reason: HOSPADM

## 2024-01-08 RX ORDER — FERROUS SULFATE 325(65) MG
325 TABLET ORAL
Status: DISCONTINUED | OUTPATIENT
Start: 2024-01-08 | End: 2024-01-10

## 2024-01-08 RX ORDER — SODIUM CHLORIDE, SODIUM LACTATE, POTASSIUM CHLORIDE, AND CALCIUM CHLORIDE .6; .31; .03; .02 G/100ML; G/100ML; G/100ML; G/100ML
500 INJECTION, SOLUTION INTRAVENOUS ONCE
Status: DISCONTINUED | OUTPATIENT
Start: 2024-01-08 | End: 2024-01-08

## 2024-01-08 RX ORDER — MAGNESIUM SULFATE IN WATER 40 MG/ML
2000 INJECTION, SOLUTION INTRAVENOUS PRN
Status: DISCONTINUED | OUTPATIENT
Start: 2024-01-08 | End: 2024-01-11 | Stop reason: HOSPADM

## 2024-01-08 RX ORDER — INSULIN LISPRO 100 [IU]/ML
0-4 INJECTION, SOLUTION INTRAVENOUS; SUBCUTANEOUS NIGHTLY
Status: DISCONTINUED | OUTPATIENT
Start: 2024-01-08 | End: 2024-01-11 | Stop reason: HOSPADM

## 2024-01-08 RX ORDER — SODIUM CHLORIDE, SODIUM LACTATE, POTASSIUM CHLORIDE, CALCIUM CHLORIDE 600; 310; 30; 20 MG/100ML; MG/100ML; MG/100ML; MG/100ML
INJECTION, SOLUTION INTRAVENOUS CONTINUOUS
Status: DISPENSED | OUTPATIENT
Start: 2024-01-08 | End: 2024-01-08

## 2024-01-08 RX ORDER — ONDANSETRON 2 MG/ML
4 INJECTION INTRAMUSCULAR; INTRAVENOUS EVERY 6 HOURS PRN
Status: DISCONTINUED | OUTPATIENT
Start: 2024-01-08 | End: 2024-01-11 | Stop reason: HOSPADM

## 2024-01-08 RX ORDER — GABAPENTIN 100 MG/1
100 CAPSULE ORAL 3 TIMES DAILY
Status: DISCONTINUED | OUTPATIENT
Start: 2024-01-08 | End: 2024-01-11 | Stop reason: HOSPADM

## 2024-01-08 RX ORDER — LANOLIN ALCOHOL/MO/W.PET/CERES
400 CREAM (GRAM) TOPICAL DAILY
Status: DISCONTINUED | OUTPATIENT
Start: 2024-01-08 | End: 2024-01-11 | Stop reason: HOSPADM

## 2024-01-08 RX ORDER — LEVETIRACETAM 500 MG/1
500 TABLET ORAL 2 TIMES DAILY
Status: DISCONTINUED | OUTPATIENT
Start: 2024-01-08 | End: 2024-01-11 | Stop reason: HOSPADM

## 2024-01-08 RX ORDER — METOPROLOL SUCCINATE 50 MG/1
50 TABLET, EXTENDED RELEASE ORAL DAILY
Status: DISCONTINUED | OUTPATIENT
Start: 2024-01-08 | End: 2024-01-11 | Stop reason: HOSPADM

## 2024-01-08 RX ORDER — ACETAMINOPHEN 325 MG/1
650 TABLET ORAL EVERY 6 HOURS PRN
Status: DISCONTINUED | OUTPATIENT
Start: 2024-01-08 | End: 2024-01-11 | Stop reason: HOSPADM

## 2024-01-08 RX ADMIN — INSULIN LISPRO 2 UNITS: 100 INJECTION, SOLUTION INTRAVENOUS; SUBCUTANEOUS at 12:48

## 2024-01-08 RX ADMIN — NYSTATIN 500000 UNITS: 100000 SUSPENSION ORAL at 22:00

## 2024-01-08 RX ADMIN — METOPROLOL SUCCINATE 50 MG: 50 TABLET, EXTENDED RELEASE ORAL at 09:49

## 2024-01-08 RX ADMIN — SODIUM CHLORIDE, PRESERVATIVE FREE 40 MG: 5 INJECTION INTRAVENOUS at 09:49

## 2024-01-08 RX ADMIN — INSULIN LISPRO 2 UNITS: 100 INJECTION, SOLUTION INTRAVENOUS; SUBCUTANEOUS at 09:50

## 2024-01-08 RX ADMIN — LEVETIRACETAM 500 MG: 250 TABLET, FILM COATED ORAL at 09:50

## 2024-01-08 RX ADMIN — ATORVASTATIN CALCIUM 40 MG: 40 TABLET, FILM COATED ORAL at 09:50

## 2024-01-08 RX ADMIN — INSULIN GLARGINE 16 UNITS: 100 INJECTION, SOLUTION SUBCUTANEOUS at 22:00

## 2024-01-08 RX ADMIN — Medication 400 MG: at 09:50

## 2024-01-08 RX ADMIN — LEVETIRACETAM 500 MG: 250 TABLET, FILM COATED ORAL at 22:00

## 2024-01-08 RX ADMIN — SODIUM CHLORIDE, PRESERVATIVE FREE 10 ML: 5 INJECTION INTRAVENOUS at 10:00

## 2024-01-08 RX ADMIN — SODIUM CHLORIDE: 9 INJECTION, SOLUTION INTRAVENOUS at 22:00

## 2024-01-08 RX ADMIN — NYSTATIN 500000 UNITS: 100000 SUSPENSION ORAL at 17:07

## 2024-01-08 RX ADMIN — NYSTATIN 500000 UNITS: 100000 SUSPENSION ORAL at 09:49

## 2024-01-08 RX ADMIN — GABAPENTIN 100 MG: 100 CAPSULE ORAL at 22:00

## 2024-01-08 RX ADMIN — GABAPENTIN 100 MG: 100 CAPSULE ORAL at 14:41

## 2024-01-08 RX ADMIN — SODIUM CHLORIDE, PRESERVATIVE FREE 10 ML: 5 INJECTION INTRAVENOUS at 22:00

## 2024-01-08 RX ADMIN — CYANOCOBALAMIN TAB 1000 MCG 1000 MCG: 1000 TAB at 09:50

## 2024-01-08 RX ADMIN — FENOFIBRATE 54 MG: 54 TABLET ORAL at 09:50

## 2024-01-08 RX ADMIN — ASPIRIN 81 MG: 81 TABLET, CHEWABLE ORAL at 09:50

## 2024-01-08 RX ADMIN — FERROUS SULFATE TAB 325 MG (65 MG ELEMENTAL FE) 325 MG: 325 (65 FE) TAB at 09:50

## 2024-01-08 RX ADMIN — NYSTATIN 500000 UNITS: 100000 SUSPENSION ORAL at 12:48

## 2024-01-08 RX ADMIN — AMLODIPINE BESYLATE 5 MG: 5 TABLET ORAL at 09:50

## 2024-01-08 RX ADMIN — SODIUM CHLORIDE, POTASSIUM CHLORIDE, SODIUM LACTATE AND CALCIUM CHLORIDE: 600; 310; 30; 20 INJECTION, SOLUTION INTRAVENOUS at 04:58

## 2024-01-08 RX ADMIN — GABAPENTIN 100 MG: 100 CAPSULE ORAL at 09:50

## 2024-01-08 ASSESSMENT — ENCOUNTER SYMPTOMS
SHORTNESS OF BREATH: 0
VOMITING: 0
WHEEZING: 0
DIARRHEA: 0
BACK PAIN: 0
NAUSEA: 0

## 2024-01-08 NOTE — PROGRESS NOTES
Hospitalist Progress Note    NAME:   Maureen Chahal   : 1945   MRN: [id number]     Date/Time: 2024 10:46 AM  Patient PCP: Rosa Mariee MD    Estimated discharge date: 24 hours  Barriers: Hgb continued stabilization, GI clearance, Creatinine improvement      Assessment / Plan:  Melena  T&S done  Hgb stable at 10.4  Fecal occult negative  GI consult pending however low suspicion for continued or active bleeding  Continue PPI for now  Metabolic acidosis  Repeat VBG, UA, beta hydroxybutyrate pending  Continue IVF  Oral thrush  Continue PO nystatin  Diabetes type II  Hold oral antihyperglycemics  Continue POC glucose and SSI  Essential HTN  Continue amlodipine 5 mg, metoprolol 50 mg  HLP  Continue statin, fenofibrate  Mild MOOSE  Continue IVF  Suspected secondary to poor oral intake and dehydration  Creatinine 1.49, baseline 0.7  Mental status  Patient confused, likely baseline per chart review given history of CVA and residence at [facility]  Attempted to discuss with NOK via telephone to establish baseline, no response, will re-attempt later.      Medical Decision Making:   I personally reviewed labs:CBC, BMP, H&H  I personally reviewed imaging:CXR  Toxic drug monitoring: insulin monitor for hypoglycemia  Discussed case with: patient, nurse        Code Status: DNR  DVT Prophylaxis: SCDs in setting of GI bleed  GI Prophylaxis:protonix    Subjective:     Chief Complaint / Reason for Physician Visit  Melena.  Discussed with RN events overnight.     Hospital course:  Maureen Chahal is a 78 y.o. female with PMH of diabetes, hypertension and other medical problems as below. Presented to the ED from SNF with chief complaint of dark stool. She reports having nausea and vomiting yesterday.  Otherwise denies UTI symptoms, suprapubic pain, flank pain, fever or chills. Per nursing staff at SNF, patient had episodes of dark green emesis, and has been more lethargic than baseline. In the ED, noted  tachycardic with soft blood pressure.  No leukocytosis, however hemoglobin dropped from 10 to 9 in 4 hours. Also elevated BUN and low CO2. LA within normal limits.  Chest X-ray no acute finding. Urinalysis pending. IV protonix and IVF initiated. GI consult pending.    Subjective:  Patient seen in room. She is not a very good historian. She denies any current discomfort. She states she has not had any bloody stool or diarrhea. No complaints. Attempted to discuss treatment plan with NOK via telephone, no response.   Objective:     VITALS:   Last 24hrs VS reviewed since prior progress note. Most recent are:  Patient Vitals for the past 24 hrs:   BP Temp Temp src Pulse Resp SpO2 Height Weight   01/08/24 0745 (!) 150/49 97.7 °F (36.5 °C) Axillary 90 18 99 % -- --   01/08/24 0449 (!) 117/44 98.2 °F (36.8 °C) Oral 97 19 98 % -- --   01/08/24 0400 (!) 109/51 -- -- 93 15 99 % -- --   01/08/24 0319 (!) 131/49 -- -- 94 16 -- -- --   01/08/24 0259 (!) 124/53 -- -- 93 14 -- -- --   01/08/24 0245 (!) 111/54 -- -- 95 14 97 % -- --   01/08/24 0145 (!) 135/50 -- -- 91 16 96 % -- --   01/08/24 0115 (!) 128/58 -- -- 87 14 96 % -- --   01/08/24 0045 (!) 116/55 -- -- 97 19 94 % -- --   01/08/24 0000 (!) 112/55 -- -- 100 16 95 % -- --   01/07/24 2300 (!) 111/43 -- -- 98 15 96 % -- --   01/07/24 2200 (!) 112/50 -- -- (!) 108 17 94 % -- --   01/07/24 2130 (!) 111/45 -- -- (!) 114 11 96 % -- --   01/07/24 2115 (!) 120/49 -- -- (!) 112 17 96 % -- --   01/07/24 2112 (!) 118/42 98.4 °F (36.9 °C) Oral (!) 112 14 96 % 1.626 m (5' 4\") 71.7 kg (158 lb)         Intake/Output Summary (Last 24 hours) at 1/8/2024 1046  Last data filed at 1/8/2024 0923  Gross per 24 hour   Intake 360 ml   Output --   Net 360 ml        I had a face to face encounter and independently examined this patient on 1/8/2024, as outlined below:    Review of Systems   Reason unable to perform ROS: Confusion.   Constitutional:  Negative for diaphoresis, fatigue and fever.

## 2024-01-08 NOTE — ED NOTES
ED TO INPATIENT SBAR HANDOFF    Patient Name: Maureen Chahal   Preferred Name: Maureen  : 1945  78 y.o.   Family/Caregiver Present: no   Code Status Order: Prior  PO Status: {NPO:No  Telemetry Order:   C-SSRS: Risk of Suicide: No Risk  Sitter no   Restraints:     Sepsis Risk Score Sepsis Risk Score: 4.71    Situation  Chief Complaint   Patient presents with    Melena     Brief Description of Patient's Condition: Pt presents from [facility] for vomiting and dark stools. Pt only c/o headache, has had no episodes of vomiting. Stool is dark green with negative occult. Pt had 1 point drop in hemoglobin over 4hr  Mental Status: alert, oriented to self at baseline  Arrived from:Assisted Living  Imaging:   XR CHEST PORTABLE   Final Result      No acute process on portable chest.           Abnormal labs:   Abnormal Labs Reviewed   CBC WITH AUTO DIFFERENTIAL - Abnormal; Notable for the following components:       Result Value    RBC 3.65 (*)     Hemoglobin 10.3 (*)     Hematocrit 33.3 (*)     Neutrophils % 91 (*)     Lymphocytes % 4 (*)     Neutrophils Absolute 9.8 (*)     Lymphocytes Absolute 0.5 (*)     All other components within normal limits   COMPREHENSIVE METABOLIC PANEL - Abnormal; Notable for the following components:    Chloride 109 (*)     CO2 18 (*)     Anion Gap 16 (*)     Glucose 252 (*)     BUN 55 (*)     Creatinine 1.32 (*)     Bun/Cre Ratio 42 (*)     Est, Glom Filt Rate 42 (*)     Total Protein 6.2 (*)     Albumin 3.1 (*)     Albumin/Globulin Ratio 1.0 (*)     All other components within normal limits   PROTIME-INR - Abnormal; Notable for the following components:    Protime 14.7 (*)     All other components within normal limits   HEMOGLOBIN AND HEMATOCRIT - Abnormal; Notable for the following components:    Hemoglobin 9.1 (*)     Hematocrit 29.4 (*)     All other components within normal limits   POC BLOOD GAS AND CHEMISTRY - Abnormal; Notable for the following components:    POC

## 2024-01-08 NOTE — CONSULTS
Gastroenterology Consult Note        Patient: Maureen Chahal MRN: [id number]  SSN: [id number]    YOB: 1945  Age: 78 y.o.  Sex: female        Note  from medical records  Maureen Chahal is a 78 y.o. female who is being seen for gi bleeding .  8 y.o. female with PMH of diabetes, hypertension who was brought to the emergency room by the staff due to the of dark stool, having nausea and vomiting yesterday.   Per nursing staff at [facility], patient had episodes of dark green emesis, In the ED, hemoglobin dropped from 10 to 9 in 4 hours. Also elevated BUN and low CO2.   Patient reason for today, no nausea vomiting today, but minimal by mouth intake, still have dark stool.  Past Medical History:   Diagnosis Date    Diabetes (HCC)     Dysphagia     Hypercholesterolemia     Hypertension     Osteoporosis     Stroke (HCC)      Past Surgical History:   Procedure Laterality Date    BACK SURGERY      CARPAL TUNNEL RELEASE      CATARACT REMOVAL Bilateral     ORTHOPEDIC SURGERY      ankle      Family History   Problem Relation Age of Onset    Heart Surgery Father     Hypertension Mother      Social History     Tobacco Use    Smoking status: Never    Smokeless tobacco: Never   Substance Use Topics    Alcohol use: Not Currently      Current Facility-Administered Medications   Medication Dose Route Frequency Provider Last Rate Last Admin    amLODIPine (NORVASC) tablet 5 mg  5 mg Oral Daily Ian Medley MD   5 mg at 01/08/24 0950    aspirin chewable tablet 81 mg  81 mg Oral Daily Ian Medley MD   81 mg at 01/08/24 0950    atorvastatin (LIPITOR) tablet 40 mg  40 mg Oral Daily Ian Medley MD   40 mg at 01/08/24 0950    vitamin B-12 (CYANOCOBALAMIN) tablet 1,000 mcg  1,000 mcg Oral Daily Ian Medley MD   1,000 mcg at 01/08/24 0950    fenofibrate (TRICOR) tablet 54 mg  54 mg Oral Daily Ian Medley MD   54 mg at 01/08/24 0950    gabapentin (NEURONTIN) capsule 100 mg  100 mg Oral TID Ian Medley

## 2024-01-08 NOTE — PLAN OF CARE
Problem: Discharge Planning  Goal: Discharge to home or other facility with appropriate resources  Recent Flowsheet Documentation  Taken 1/8/2024 0950 by Anthony Diaz LPN  Discharge to home or other facility with appropriate resources: Identify barriers to discharge with patient and caregiver  1/8/2024 0643 by Britta Avelar RN  Outcome: Progressing     Problem: Skin/Tissue Integrity  Goal: Absence of new skin breakdown  Description: 1.  Monitor for areas of redness and/or skin breakdown  2.  Assess vascular access sites hourly  3.  Every 4-6 hours minimum:  Change oxygen saturation probe site  4.  Every 4-6 hours:  If on nasal continuous positive airway pressure, respiratory therapy assess nares and determine need for appliance change or resting period.  1/8/2024 0643 by Britta Avelar RN  Outcome: Progressing     Problem: Safety - Adult  Goal: Free from fall injury  1/8/2024 0643 by Britta Avelar RN  Outcome: Progressing     Problem: Confusion  Goal: Confusion, delirium, dementia, or psychosis is improved or at baseline  Description: INTERVENTIONS:  1. Assess for possible contributors to thought disturbance, including medications, impaired vision or hearing, underlying metabolic abnormalities, dehydration, psychiatric diagnoses, and notify attending LIP  2. [place] high risk fall precautions, as indicated  3. Provide frequent short contacts to provide reality reorientation, refocusing and direction  4. Decrease environmental stimuli, including noise as appropriate  5. Monitor and intervene to maintain adequate nutrition, hydration, elimination, sleep and activity  6. If unable to ensure safety without constant attention obtain sitter and review sitter guidelines with assigned personnel  7. Initiate Psychosocial CNS and Spiritual Care consult, as indicated  1/8/2024 0643 by Britta Avelar RN  Outcome: Progressing     Problem: Neurosensory - Adult  Goal: Achieves  stable or improved neurological status  Recent Flowsheet Documentation  Taken 1/8/2024 0950 by Anthony Diaz LPN  Achieves stable or improved neurological status:   Assess for and report changes in neurological status   Initiate measures to prevent increased intracranial pressure   Maintain blood pressure and fluid volume within ordered parameters to optimize cerebral perfusion and minimize risk of hemorrhage   Monitor temperature, glucose, and sodium. Initiate appropriate interventions as ordered  1/8/2024 0643 by Britta Avelar RN  Outcome: Progressing     Problem: Gastrointestinal - Adult  Goal: Minimal or absence of nausea and vomiting  1/8/2024 0643 by Britta Avelar RN  Outcome: Progressing  Goal: Maintains or returns to baseline bowel function  1/8/2024 0643 by Britta Avelar RN  Outcome: Progressing     Problem: Metabolic/Fluid and Electrolytes - Adult  Goal: Electrolytes maintained within normal limits  Recent Flowsheet Documentation  Taken 1/8/2024 0950 by Anthony Diaz LPN  Electrolytes maintained within normal limits:   Monitor labs and assess patient for signs and symptoms of electrolyte imbalances   Administer electrolyte replacement as ordered   Monitor response to electrolyte replacements, including repeat lab results as appropriate  1/8/2024 0643 by Britta Avelar RN  Outcome: Progressing     Problem: Hematologic - Adult  Goal: Maintains hematologic stability  Recent Flowsheet Documentation  Taken 1/8/2024 0950 by Anthony Diaz LPN  Maintains hematologic stability:   Assess for signs and symptoms of bleeding or hemorrhage   Monitor labs for bleeding or clotting disorders  1/8/2024 0643 by Britta Avelar RN  Outcome: Progressing

## 2024-01-08 NOTE — PROGRESS NOTES
4 Eyes Skin Assessment     NAME:  Maureen Chahal  YOB: 1945  MEDICAL RECORD NUMBER:  [id number]    The patient is being assessed for  Admission    I agree that at least one RN has performed a thorough Head to Toe Skin Assessment on the patient. ALL assessment sites listed below have been assessed.      Areas assessed by both nurses:    Head, Face, Ears, Shoulders, Back, Chest, Arms, Elbows, Hands, Sacrum. Buttock, Coccyx, Ischium, Legs. Feet and Heels, and Under Medical Devices         Does the Patient have a Wound? No noted wound(s) There was some redness noted to patient's right thigh fold.       Hiram Prevention initiated by RN: Yes  Wound Care Orders initiated by RN: No    Pressure Injury (Stage 3,4, Unstageable, DTI, NWPT, and Complex wounds) if present, place Wound referral order by RN under : No    New Ostomies, if present place, Ostomy referral order under : No     Nurse 1 eSignature: Electronically signed by Britta Avelar RN on 1/8/24 at 4:47 AM EST    **SHARE this note so that the co-signing nurse can place an eSignature**    Nurse 2 eSignature: Electronically signed by Jamarcus Vasquez RN on 1/8/24 at 4:59 AM EST

## 2024-01-08 NOTE — ED TRIAGE NOTES
Pt presents from [facility] for dark tar stools x1-2 days but staff can not confirm. Pt also nausea and vomiting dark green emesis. Staff attests that pt has been weak and lethargic from her baseline. ODT ondan given PTA. H/o CVA with R sided deficit. . Anticoagulated with plavix

## 2024-01-08 NOTE — H&P
History & Physical    Primary Care Provider: Rosa Mariee MD    Chief complaint:   Chief Complaint   Patient presents with    Melena        History of Presenting Illness:   Maureen Chahal is a 78 y.o. female with PMH of diabetes, hypertension and other medical problems as below. Presented to the ED from SNF with chief complaint of dark stool. She reports having nausea and vomiting yesterday.  Otherwise denies UTI symptoms, suprapubic pain, flank pain, fever or chills. Per nursing staff at SNF, patient had episodes of dark green emesis, and has been more lethargic than baseline.    In the ED, noted tachycardic with soft blood pressure.  No leukocytosis, however hemoglobin dropped from 10 to 9 in 4 hours. Also elevated BUN and low CO2. LA within normal limits.  Chest X-ray no acute finding. Urinalysis pending.       Chart review: none          Past Medical History:   Diagnosis Date    Diabetes (HCC)     Dysphagia     Hypercholesterolemia     Hypertension     Osteoporosis     Stroke (HCC)         Past Surgical History:   Procedure Laterality Date    BACK SURGERY      CARPAL TUNNEL RELEASE      CATARACT REMOVAL Bilateral     ORTHOPEDIC SURGERY      ankle       Family History   Problem Relation Age of Onset    Heart Surgery Father     Hypertension Mother         Social History     Socioeconomic History    Marital status: Single   Tobacco Use    Smoking status: Never    Smokeless tobacco: Never   Substance and Sexual Activity    Alcohol use: Not Currently    Drug use: Never        PTA medications and allergies per EMR.     Objective:     Physical Exam:     BP (!) 131/49   Pulse 94   Temp 98.4 °F (36.9 °C) (Oral)   Resp 16   Ht 1.626 m (5' 4\")   Wt 71.7 kg (158 lb)   SpO2 97%   BMI 27.12 kg/m²    O2 Device: None (Room air)    General: Lethargic and confused, cooperative, moderate distress  Head & neck: Normocephalic, without obvious abnormality, atraumatic. Neck supple, symmetrical, trachea midline.  health: None    # Full code as default. Need further clarification.     # Medication reconciliation though EMR and/or outside facility documentation. , However, medication reconciliation incomplete. Appreciate nursing/pharmacy's assistance             Signed By: Ian Yanes Cha, MD     January 8, 2024

## 2024-01-08 NOTE — CARE COORDINATION
01/08/24 1433   Service Assessment   Patient Orientation Person   Cognition Alert   History Provided By Child/Family   Primary Caregiver Other (Comment)  ([facility]   Support Systems Children   Patient's Healthcare Decision Maker is: Legal Next of Kin   PCP Verified by CM Yes   Last Visit to PCP Within last 3 months   Prior Functional Level Assistance with the following:;Bathing;Dressing;Toileting;Feeding;Cooking;Housework;Shopping;Mobility   Current Functional Level Bathing;Assistance with the following:;Dressing;Toileting;Feeding;Cooking;Housework;Shopping;Mobility   Can patient return to prior living arrangement Yes   Ability to make needs known: Good   Family able to assist with home care needs: Yes   Would you like for me to discuss the discharge plan with any other family members/significant others, and if so, who? Yes       CM spoke with Son Phill Chahal [phone]. He states that patient is dependent and needs assistance with all ADLs/IADLs, she has had a stroke and her right upper and lower extremities are affected.  He states patient is living at the [facility] and will return when medically stable for discharge.         Phill Chahal Child [phone]

## 2024-01-08 NOTE — ED PROVIDER NOTES
[facility] EMERGENCY DEPT  EMERGENCY DEPARTMENT HISTORY AND PHYSICAL EXAM      Date: 1/7/2024  Patient Name: Maureen Chahal  MRN: [id number]  Birthdate 1945  Date of evaluation: 1/7/2024  Provider: Carlito Erickson MD   Note Started: 10:08 PM EST 1/7/24    HISTORY OF PRESENT ILLNESS     Chief Complaint   Patient presents with    Melena       History Provided By: Patient    HPI: Maureen Chahal is a 78 y.o. female with PMH of DM, HTN, HLD, and CVA who comes to the ED from SNF with concerns for dark-colored stool.  Per nursing staff at SNF, patient had of his dark green emesis.  Apparently patient appears weaker and more lethargic than baseline.  Upon questioning the patient she report that she feels nauseous and vomited earlier.  She however is denying any abdominal pain, nausea, vomiting, shortness of breath.  Denies any chest pain, headache, dysuria and hematuria.    PAST MEDICAL HISTORY   Past Medical History:  Past Medical History:   Diagnosis Date    Diabetes (HCC)     Dysphagia     Hypercholesterolemia     Hypertension     Osteoporosis     Stroke (HCC)        Past Surgical History:  Past Surgical History:   Procedure Laterality Date    BACK SURGERY      CARPAL TUNNEL RELEASE      CATARACT REMOVAL Bilateral     ORTHOPEDIC SURGERY      ankle       Family History:  Family History   Problem Relation Age of Onset    Heart Surgery Father     Hypertension Mother        Social History:  Social History     Tobacco Use    Smoking status: Never    Smokeless tobacco: Never   Substance Use Topics    Alcohol use: Not Currently    Drug use: Never       Allergies:  No Known Allergies    PCP: Rosa Mariee MD    Current Meds:   Current Facility-Administered Medications   Medication Dose Route Frequency Provider Last Rate Last Admin    amLODIPine (NORVASC) tablet 5 mg  5 mg Oral Daily Ian Medley MD        aspirin chewable tablet 81 mg  81 mg Oral Daily Ian Medley MD        atorvastatin (LIPITOR) tablet 40 mg  40  mmol/L    POC Potassium 3.7 3.5 - 5.5 mmol/L    POC Ionized Calcium 1.15 1.12 - 1.32 mmol/L    POC Glucose 252 (H) 65 - 100 mg/dL    POC Chloride 113 (H) 98 - 107 MMOL/L    POC Creatinine 1.14 0.6 - 1.3 MG/DL    eGFR, POC 50 (L) >60 ml/min/1.73m2    BASE DEFICIT (POC) 8.7 mmol/L    POC HCO3 17.2 (L) 19.0 - 28.0 mmol/L    POC TCO2 17 MMOL/L    Source Venous      Performed by: Ananda Shirley     POC Lactic Acid 1.20 0.40 - 2.00 mmol/L    POC O2 SAT 68 %   Occult Blood, Fecal    Collection Time: 01/07/24 10:11 PM   Result Value Ref Range    Occult Blood, Stool #1 Negative Negative      Date 924     COVID-19, Rapid    Collection Time: 01/07/24 10:59 PM    Specimen: Nasopharyngeal   Result Value Ref Range    SARS-CoV-2, Rapid Not Detected Not Detected     Hemoglobin and Hematocrit    Collection Time: 01/08/24 12:50 AM   Result Value Ref Range    Hemoglobin 9.1 (L) 11.5 - 16.0 g/dL    Hematocrit 29.4 (L) 35.0 - 47.0 %       Radiologic Studies:  Non-plain film images such as CT, Ultrasound and MRI are read by the radiologist. Plain radiographic images are visualized and preliminarily interpreted by the ED Provider with the following findings: See ED Course Below    Interpretation per the Radiologist below, if available at the time of this note:  XR CHEST PORTABLE   Final Result      No acute process on portable chest.              EMERGENCY DEPARTMENT COURSE and DIFFERENTIAL DIAGNOSIS/MDM   CC/HPI Summary, DDx, ED Course, and Reassessment:     Records Reviewed (source and summary of external notes): Prior medical records and Nursing notes    Vitals:    Vitals:    01/08/24 0245 01/08/24 0259 01/08/24 0319 01/08/24 0400   BP: (!) 111/54 (!) 124/53 (!) 131/49 (!) 109/51   Pulse: 95 93 94 93   Resp: 14 14 16 15   Temp:       TempSrc:       SpO2: 97%   99%   Weight:       Height:         Patient is 78-year-old female with PMH of DM, HTN, HLD, CVA comes in from SNF with concerns visualized weakness.  There are some concerns for

## 2024-01-09 ENCOUNTER — ANESTHESIA EVENT (OUTPATIENT)
Facility: HOSPITAL | Age: 79
End: 2024-01-09
Payer: MEDICARE

## 2024-01-09 ENCOUNTER — ANESTHESIA (OUTPATIENT)
Facility: HOSPITAL | Age: 79
End: 2024-01-09
Payer: MEDICARE

## 2024-01-09 LAB
ANION GAP SERPL CALC-SCNC: 7 MMOL/L (ref 5–15)
BASOPHILS # BLD: 0 K/UL (ref 0–0.1)
BASOPHILS NFR BLD: 0 % (ref 0–1)
BUN SERPL-MCNC: 54 MG/DL (ref 6–20)
BUN/CREAT SERPL: 47 (ref 12–20)
CA-I BLD-MCNC: 8.6 MG/DL (ref 8.5–10.1)
CHLORIDE SERPL-SCNC: 109 MMOL/L (ref 97–108)
CO2 SERPL-SCNC: 23 MMOL/L (ref 21–32)
CREAT SERPL-MCNC: 1.16 MG/DL (ref 0.55–1.02)
DIFFERENTIAL METHOD BLD: ABNORMAL
EKG ATRIAL RATE: 113 BPM
EKG DIAGNOSIS: NORMAL
EKG P AXIS: 62 DEGREES
EKG P-R INTERVAL: 140 MS
EKG Q-T INTERVAL: 376 MS
EKG QRS DURATION: 92 MS
EKG QTC CALCULATION (BAZETT): 515 MS
EKG R AXIS: -20 DEGREES
EKG T AXIS: 74 DEGREES
EKG VENTRICULAR RATE: 113 BPM
EOSINOPHIL # BLD: 0 K/UL (ref 0–0.4)
EOSINOPHIL NFR BLD: 0 % (ref 0–7)
ERYTHROCYTE [DISTWIDTH] IN BLOOD BY AUTOMATED COUNT: 14 % (ref 11.5–14.5)
GLUCOSE BLD STRIP.AUTO-MCNC: 166 MG/DL (ref 65–100)
GLUCOSE BLD STRIP.AUTO-MCNC: 182 MG/DL (ref 65–100)
GLUCOSE BLD STRIP.AUTO-MCNC: 206 MG/DL (ref 65–100)
GLUCOSE SERPL-MCNC: 195 MG/DL (ref 65–100)
HCT VFR BLD AUTO: 29.4 % (ref 35–47)
HGB BLD-MCNC: 9.4 G/DL (ref 11.5–16)
IMM GRANULOCYTES # BLD AUTO: 0 K/UL (ref 0–0.04)
IMM GRANULOCYTES NFR BLD AUTO: 0 % (ref 0–0.5)
LYMPHOCYTES # BLD: 0.6 K/UL (ref 0.8–3.5)
LYMPHOCYTES NFR BLD: 15 % (ref 12–49)
MCH RBC QN AUTO: 28.5 PG (ref 26–34)
MCHC RBC AUTO-ENTMCNC: 32 G/DL (ref 30–36.5)
MCV RBC AUTO: 89.1 FL (ref 80–99)
MONOCYTES # BLD: 0.5 K/UL (ref 0–1)
MONOCYTES NFR BLD: 14 % (ref 5–13)
NEUTS SEG # BLD: 2.6 K/UL (ref 1.8–8)
NEUTS SEG NFR BLD: 71 % (ref 32–75)
NRBC # BLD: 0 K/UL (ref 0–0.01)
NRBC BLD-RTO: 0 PER 100 WBC
PERFORMED BY:: ABNORMAL
PLATELET # BLD AUTO: 271 K/UL (ref 150–400)
PMV BLD AUTO: 10.5 FL (ref 8.9–12.9)
POTASSIUM SERPL-SCNC: 3.2 MMOL/L (ref 3.5–5.1)
RBC # BLD AUTO: 3.3 M/UL (ref 3.8–5.2)
SODIUM SERPL-SCNC: 139 MMOL/L (ref 136–145)
WBC # BLD AUTO: 3.7 K/UL (ref 3.6–11)

## 2024-01-09 PROCEDURE — 7100000010 HC PHASE II RECOVERY - FIRST 15 MIN: Performed by: INTERNAL MEDICINE

## 2024-01-09 PROCEDURE — 36415 COLL VENOUS BLD VENIPUNCTURE: CPT

## 2024-01-09 PROCEDURE — 3600007502: Performed by: INTERNAL MEDICINE

## 2024-01-09 PROCEDURE — 2500000003 HC RX 250 WO HCPCS

## 2024-01-09 PROCEDURE — 6360000002 HC RX W HCPCS: Performed by: PHYSICIAN ASSISTANT

## 2024-01-09 PROCEDURE — 6360000002 HC RX W HCPCS

## 2024-01-09 PROCEDURE — C9113 INJ PANTOPRAZOLE SODIUM, VIA: HCPCS | Performed by: INTERNAL MEDICINE

## 2024-01-09 PROCEDURE — 3700000001 HC ADD 15 MINUTES (ANESTHESIA): Performed by: INTERNAL MEDICINE

## 2024-01-09 PROCEDURE — 3600007512: Performed by: INTERNAL MEDICINE

## 2024-01-09 PROCEDURE — 1100000000 HC RM PRIVATE

## 2024-01-09 PROCEDURE — 3700000000 HC ANESTHESIA ATTENDED CARE: Performed by: INTERNAL MEDICINE

## 2024-01-09 PROCEDURE — 6360000002 HC RX W HCPCS: Performed by: INTERNAL MEDICINE

## 2024-01-09 PROCEDURE — 2580000003 HC RX 258: Performed by: INTERNAL MEDICINE

## 2024-01-09 PROCEDURE — 82962 GLUCOSE BLOOD TEST: CPT

## 2024-01-09 PROCEDURE — 80048 BASIC METABOLIC PNL TOTAL CA: CPT

## 2024-01-09 PROCEDURE — 6370000000 HC RX 637 (ALT 250 FOR IP): Performed by: INTERNAL MEDICINE

## 2024-01-09 PROCEDURE — 2580000003 HC RX 258

## 2024-01-09 PROCEDURE — 81001 URINALYSIS AUTO W/SCOPE: CPT

## 2024-01-09 PROCEDURE — 85025 COMPLETE CBC W/AUTO DIFF WBC: CPT

## 2024-01-09 RX ORDER — SODIUM CHLORIDE, SODIUM LACTATE, POTASSIUM CHLORIDE, CALCIUM CHLORIDE 600; 310; 30; 20 MG/100ML; MG/100ML; MG/100ML; MG/100ML
INJECTION, SOLUTION INTRAVENOUS CONTINUOUS PRN
Status: DISCONTINUED | OUTPATIENT
Start: 2024-01-09 | End: 2024-01-09 | Stop reason: SDUPTHER

## 2024-01-09 RX ORDER — POTASSIUM CHLORIDE 7.45 MG/ML
10 INJECTION INTRAVENOUS
Status: DISPENSED | OUTPATIENT
Start: 2024-01-09 | End: 2024-01-09

## 2024-01-09 RX ORDER — POTASSIUM CHLORIDE 7.45 MG/ML
10 INJECTION INTRAVENOUS ONCE
Status: COMPLETED | OUTPATIENT
Start: 2024-01-09 | End: 2024-01-09

## 2024-01-09 RX ADMIN — NYSTATIN 500000 UNITS: 100000 SUSPENSION ORAL at 10:01

## 2024-01-09 RX ADMIN — Medication 400 MG: at 10:01

## 2024-01-09 RX ADMIN — POTASSIUM CHLORIDE 10 MEQ: 7.46 INJECTION, SOLUTION INTRAVENOUS at 09:51

## 2024-01-09 RX ADMIN — SODIUM CHLORIDE, PRESERVATIVE FREE 10 ML: 5 INJECTION INTRAVENOUS at 20:32

## 2024-01-09 RX ADMIN — SODIUM CHLORIDE, PRESERVATIVE FREE 40 MG: 5 INJECTION INTRAVENOUS at 09:58

## 2024-01-09 RX ADMIN — LEVETIRACETAM 500 MG: 250 TABLET, FILM COATED ORAL at 10:01

## 2024-01-09 RX ADMIN — INSULIN GLARGINE 16 UNITS: 100 INJECTION, SOLUTION SUBCUTANEOUS at 20:29

## 2024-01-09 RX ADMIN — ASPIRIN 81 MG: 81 TABLET, CHEWABLE ORAL at 10:01

## 2024-01-09 RX ADMIN — POTASSIUM CHLORIDE 10 MEQ: 7.46 INJECTION, SOLUTION INTRAVENOUS at 10:41

## 2024-01-09 RX ADMIN — ATORVASTATIN CALCIUM 40 MG: 40 TABLET, FILM COATED ORAL at 10:01

## 2024-01-09 RX ADMIN — SODIUM CHLORIDE, PRESERVATIVE FREE 10 ML: 5 INJECTION INTRAVENOUS at 09:58

## 2024-01-09 RX ADMIN — SODIUM CHLORIDE, POTASSIUM CHLORIDE, SODIUM LACTATE AND CALCIUM CHLORIDE: 600; 310; 30; 20 INJECTION, SOLUTION INTRAVENOUS at 14:18

## 2024-01-09 RX ADMIN — LIDOCAINE HYDROCHLORIDE 100 MG: 20 INJECTION, SOLUTION EPIDURAL; INFILTRATION; INTRACAUDAL; PERINEURAL at 14:22

## 2024-01-09 RX ADMIN — FERROUS SULFATE TAB 325 MG (65 MG ELEMENTAL FE) 325 MG: 325 (65 FE) TAB at 10:01

## 2024-01-09 RX ADMIN — FENOFIBRATE 54 MG: 54 TABLET ORAL at 10:01

## 2024-01-09 RX ADMIN — NYSTATIN 500000 UNITS: 100000 SUSPENSION ORAL at 16:42

## 2024-01-09 RX ADMIN — CYANOCOBALAMIN TAB 1000 MCG 1000 MCG: 1000 TAB at 10:01

## 2024-01-09 RX ADMIN — GABAPENTIN 100 MG: 100 CAPSULE ORAL at 10:01

## 2024-01-09 RX ADMIN — NYSTATIN 500000 UNITS: 100000 SUSPENSION ORAL at 20:25

## 2024-01-09 RX ADMIN — LEVETIRACETAM 500 MG: 250 TABLET, FILM COATED ORAL at 20:24

## 2024-01-09 RX ADMIN — INSULIN LISPRO 0 UNITS: 100 INJECTION, SOLUTION INTRAVENOUS; SUBCUTANEOUS at 20:31

## 2024-01-09 RX ADMIN — METOPROLOL SUCCINATE 50 MG: 50 TABLET, EXTENDED RELEASE ORAL at 10:00

## 2024-01-09 RX ADMIN — PROPOFOL 80 MG: 10 INJECTION, EMULSION INTRAVENOUS at 14:22

## 2024-01-09 RX ADMIN — PROPOFOL 20 MG: 10 INJECTION, EMULSION INTRAVENOUS at 14:26

## 2024-01-09 RX ADMIN — AMLODIPINE BESYLATE 5 MG: 5 TABLET ORAL at 10:01

## 2024-01-09 RX ADMIN — GABAPENTIN 100 MG: 100 CAPSULE ORAL at 20:24

## 2024-01-09 RX ADMIN — POTASSIUM CHLORIDE 10 MEQ: 7.46 INJECTION, SOLUTION INTRAVENOUS at 12:42

## 2024-01-09 RX ADMIN — PROPOFOL 20 MG: 10 INJECTION, EMULSION INTRAVENOUS at 14:30

## 2024-01-09 ASSESSMENT — ENCOUNTER SYMPTOMS
NAUSEA: 0
VOMITING: 0
BACK PAIN: 0
WHEEZING: 0
SHORTNESS OF BREATH: 0
DIARRHEA: 0

## 2024-01-09 NOTE — ANESTHESIA PRE PROCEDURE
Department of Anesthesiology  Preprocedure Note       Name:  Maureen Chahal   Age:  78 y.o.  :  1945                                          MRN:  [id number]         Date:  2024      Surgeon: Surgeon(s):  Arias Patten MD    Procedure: Procedure(s):  EGD ESOPHAGOGASTRODUODENOSCOPY    Medications prior to admission:   Prior to Admission medications    Medication Sig Start Date End Date Taking? Authorizing Provider   levETIRAcetam (KEPPRA) 500 MG tablet Take 1 tablet by mouth 2 times daily 23   Clif Davalos MD   magnesium oxide (MAG-OX) 400 MG tablet Take 1 tablet by mouth daily 23   Clif Davalos MD   amLODIPine (NORVASC) 5 MG tablet Take 1 tablet by mouth daily 23   Claire Jin MD   atorvastatin (LIPITOR) 40 MG tablet Take 1 tablet by mouth daily 23   Claire Jin MD   fenofibrate (TRICOR) 48 MG tablet Take 1 tablet by mouth daily 23   Claire Jin MD   gabapentin (NEURONTIN) 100 MG capsule Take 1 capsule by mouth 3 times daily. Max Daily Amount: 300 mg 23   Claire Jin MD   LANTUS SOLOSTAR 100 UNIT/ML injection pen Inject 16 Units into the skin nightly 23   Claire Jin MD   metFORMIN (GLUCOPHAGE) 500 MG tablet Take 1 tablet by mouth 2 times daily 23   Claire Jin MD   metoprolol succinate (TOPROL XL) 50 MG extended release tablet Take 1 tablet by mouth daily 23   Claire Jin MD   mirtazapine (REMERON) 7.5 MG tablet Take 1 tablet by mouth nightly 23   Claire Jin MD   vitamin D (CHOLECALCIFEROL) 125 MCG (5000 UT) CAPS capsule Take 1 capsule by mouth daily    Claire Jin MD   ferrous sulfate (IRON 325) 325 (65 Fe) MG tablet Take 1 tablet by mouth daily (with breakfast)    Claire Jin MD   aspirin 81 MG chewable tablet Take 1 tablet by mouth daily 3/9/23   Automatic Reconciliation, Ar   clopidogrel (PLAVIX) 75 MG tablet Take 1 tablet by mouth daily 3/10/23

## 2024-01-09 NOTE — ANESTHESIA POSTPROCEDURE EVALUATION
Department of Anesthesiology  Postprocedure Note    Patient: Maureen Chahal  MRN: [id number]  YOB: 1945  Date of evaluation: 1/9/2024    Procedure Summary     Date: 01/09/24 Room / Location: [facility] ENDO 01 / [facility] ENDOSCOPY    Anesthesia Start: 1418 Anesthesia Stop: 1432    Procedure: EGD ESOPHAGOGASTRODUODENOSCOPY (Upper GI Region) Diagnosis:       Gastrointestinal hemorrhage, unspecified gastrointestinal hemorrhage type      (Gastrointestinal hemorrhage, unspecified gastrointestinal hemorrhage type [K92.2])    Surgeons: Arias Patten MD Responsible Provider: Conrado Gutierrez MD    Anesthesia Type: MAC, TIVA ASA Status: 4          Anesthesia Type: MAC, TIVA    Stacey Phase I:      Stacey Phase II:      Anesthesia Post Evaluation    Patient location during evaluation: bedside  Patient participation: complete - patient participated  Level of consciousness: awake  Pain score: 0  Airway patency: patent  Nausea & Vomiting: no vomiting and no nausea  Cardiovascular status: hemodynamically stable  Respiratory status: acceptable  Hydration status: stable  Pain management: adequate        No notable events documented.

## 2024-01-09 NOTE — PROGRESS NOTES
Hospitalist Progress Note    NAME:   Maureen Chahal   : 1945   MRN: [id number]     Date/Time: 2024 2:49 PM  Patient PCP: Rosa Mariee MD    Estimated discharge date: 24 hours  Barriers: Hgb continued stabilization, clinical improvement      Assessment / Plan:  Erosive gastropathy with recent melena  T&S done  Hgb stable at 10.4, down to 9.4 today  Fecal occult negative  GI performed EGD  revealing normal esophagus, erosive gastropathy with stigmata of recent bleeding, normal duodenum.  GI recommended PPI, monitor hemoglobin, no NSAIDs  Metabolic acidosis, improved  Repeat VBG, UA pending changed to stat  Continue IVF  Beta hydroxybutyrate mildly elevated, likely multifactorial, however acidosis has improved  Oral thrush  Continue PO nystatin  Diabetes type II  Hold oral antihyperglycemics  Continue POC glucose and SSI  Essential HTN  Continue amlodipine 5 mg, metoprolol 50 mg  HLP  Continue statin, fenofibrate  Mild MOOSE  Discontinue IVF  Suspected secondary to poor oral intake and dehydration  Creatinine 1.49 > 1.16, baseline 0.7  Mental status  Patient confused, likely baseline per chart review given history of CVA and residence at [facility]  Discussed extensively with an valerio Pollock, son, regarding patient's clinical status, mental status consistent with baseline  Hypokalemia  Replete per protocol  Diarrhea  Fecal occult negative  Per son patient has intermittent bouts of diarrhea  Monitor for worsening, no current evidence of infectious source      Medical Decision Making:   I personally reviewed labs:CBC, BMP, H&H  I personally reviewed imaging:CXR  Toxic drug monitoring: insulin monitor for hypoglycemia  Discussed case with: patient, nurse        Code Status: DNR  DVT Prophylaxis: SCDs  GI Prophylaxis:protonix    Subjective:     Chief Complaint / Reason for Physician Visit  Melena.  Discussed with RN events overnight.     Hospital course:  Maureen Chahal is a 78 y.o. female

## 2024-01-09 NOTE — CARE COORDINATION
CM reviewed chart and spoke with primary physician.  Patient scheduled for EGD today per physician.    Patient's current plan is to return to [facility] at discharge.  Patient receives therapy through [facility].    CM will continue to follow.

## 2024-01-10 PROBLEM — R53.1 GENERALIZED WEAKNESS: Status: RESOLVED | Noted: 2024-01-08 | Resolved: 2024-01-10

## 2024-01-10 LAB
ANION GAP SERPL CALC-SCNC: 6 MMOL/L (ref 5–15)
APPEARANCE UR: CLEAR
BACTERIA URNS QL MICRO: NEGATIVE /HPF
BASOPHILS # BLD: 0 K/UL (ref 0–0.1)
BASOPHILS NFR BLD: 0 % (ref 0–1)
BILIRUB UR QL: NEGATIVE
BUN SERPL-MCNC: 42 MG/DL (ref 6–20)
BUN/CREAT SERPL: 41 (ref 12–20)
CA-I BLD-MCNC: 9 MG/DL (ref 8.5–10.1)
CHLORIDE SERPL-SCNC: 116 MMOL/L (ref 97–108)
CO2 SERPL-SCNC: 19 MMOL/L (ref 21–32)
COLOR UR: ABNORMAL
CREAT SERPL-MCNC: 1.02 MG/DL (ref 0.55–1.02)
DIFFERENTIAL METHOD BLD: ABNORMAL
EOSINOPHIL # BLD: 0.1 K/UL (ref 0–0.4)
EOSINOPHIL NFR BLD: 1 % (ref 0–7)
ERYTHROCYTE [DISTWIDTH] IN BLOOD BY AUTOMATED COUNT: 13.7 % (ref 11.5–14.5)
GLUCOSE BLD STRIP.AUTO-MCNC: 135 MG/DL (ref 65–100)
GLUCOSE BLD STRIP.AUTO-MCNC: 156 MG/DL (ref 65–100)
GLUCOSE BLD STRIP.AUTO-MCNC: 166 MG/DL (ref 65–100)
GLUCOSE BLD STRIP.AUTO-MCNC: 171 MG/DL (ref 65–100)
GLUCOSE SERPL-MCNC: 135 MG/DL (ref 65–100)
GLUCOSE UR STRIP.AUTO-MCNC: NEGATIVE MG/DL
HCT VFR BLD AUTO: 34.1 % (ref 35–47)
HGB BLD-MCNC: 10.6 G/DL (ref 11.5–16)
HGB UR QL STRIP: NEGATIVE
IMM GRANULOCYTES # BLD AUTO: 0 K/UL (ref 0–0.04)
IMM GRANULOCYTES NFR BLD AUTO: 0 % (ref 0–0.5)
KETONES UR QL STRIP.AUTO: NEGATIVE MG/DL
LEUKOCYTE ESTERASE UR QL STRIP.AUTO: ABNORMAL
LYMPHOCYTES # BLD: 0.6 K/UL (ref 0.8–3.5)
LYMPHOCYTES NFR BLD: 16 % (ref 12–49)
MCH RBC QN AUTO: 28.3 PG (ref 26–34)
MCHC RBC AUTO-ENTMCNC: 31.1 G/DL (ref 30–36.5)
MCV RBC AUTO: 90.9 FL (ref 80–99)
MONOCYTES # BLD: 0.5 K/UL (ref 0–1)
MONOCYTES NFR BLD: 14 % (ref 5–13)
NEUTS SEG # BLD: 2.4 K/UL (ref 1.8–8)
NEUTS SEG NFR BLD: 69 % (ref 32–75)
NITRITE UR QL STRIP.AUTO: NEGATIVE
NRBC # BLD: 0 K/UL (ref 0–0.01)
NRBC BLD-RTO: 0 PER 100 WBC
PERFORMED BY:: ABNORMAL
PH UR STRIP: 5 (ref 5–8)
PLATELET # BLD AUTO: 260 K/UL (ref 150–400)
PMV BLD AUTO: 10.9 FL (ref 8.9–12.9)
POTASSIUM SERPL-SCNC: 3.7 MMOL/L (ref 3.5–5.1)
PROT UR STRIP-MCNC: NEGATIVE MG/DL
RBC # BLD AUTO: 3.75 M/UL (ref 3.8–5.2)
RBC #/AREA URNS HPF: ABNORMAL /HPF (ref 0–5)
SODIUM SERPL-SCNC: 141 MMOL/L (ref 136–145)
SP GR UR REFRACTOMETRY: 1.02 (ref 1–1.03)
URINE CULTURE IF INDICATED: ABNORMAL
UROBILINOGEN UR QL STRIP.AUTO: 0.1 EU/DL (ref 0.1–1)
WBC # BLD AUTO: 3.5 K/UL (ref 3.6–11)
WBC URNS QL MICRO: ABNORMAL /HPF (ref 0–4)

## 2024-01-10 PROCEDURE — 36415 COLL VENOUS BLD VENIPUNCTURE: CPT

## 2024-01-10 PROCEDURE — 2580000003 HC RX 258: Performed by: INTERNAL MEDICINE

## 2024-01-10 PROCEDURE — C9113 INJ PANTOPRAZOLE SODIUM, VIA: HCPCS | Performed by: INTERNAL MEDICINE

## 2024-01-10 PROCEDURE — 6360000002 HC RX W HCPCS: Performed by: INTERNAL MEDICINE

## 2024-01-10 PROCEDURE — 6370000000 HC RX 637 (ALT 250 FOR IP): Performed by: INTERNAL MEDICINE

## 2024-01-10 PROCEDURE — 85025 COMPLETE CBC W/AUTO DIFF WBC: CPT

## 2024-01-10 PROCEDURE — 2580000003 HC RX 258: Performed by: PHYSICIAN ASSISTANT

## 2024-01-10 PROCEDURE — 1100000000 HC RM PRIVATE

## 2024-01-10 PROCEDURE — 80048 BASIC METABOLIC PNL TOTAL CA: CPT

## 2024-01-10 PROCEDURE — 82962 GLUCOSE BLOOD TEST: CPT

## 2024-01-10 RX ORDER — SODIUM CHLORIDE 450 MG/100ML
INJECTION, SOLUTION INTRAVENOUS CONTINUOUS
Status: DISCONTINUED | OUTPATIENT
Start: 2024-01-10 | End: 2024-01-10 | Stop reason: HOSPADM

## 2024-01-10 RX ORDER — PANTOPRAZOLE SODIUM 40 MG/1
40 TABLET, DELAYED RELEASE ORAL
Qty: 30 TABLET | Refills: 0 | Status: SHIPPED | OUTPATIENT
Start: 2024-01-10 | End: 2024-02-09

## 2024-01-10 RX ADMIN — FENOFIBRATE 54 MG: 54 TABLET ORAL at 08:24

## 2024-01-10 RX ADMIN — SODIUM CHLORIDE, PRESERVATIVE FREE 40 MG: 5 INJECTION INTRAVENOUS at 08:24

## 2024-01-10 RX ADMIN — AMLODIPINE BESYLATE 5 MG: 5 TABLET ORAL at 08:23

## 2024-01-10 RX ADMIN — SODIUM CHLORIDE, PRESERVATIVE FREE 10 ML: 5 INJECTION INTRAVENOUS at 20:05

## 2024-01-10 RX ADMIN — ATORVASTATIN CALCIUM 40 MG: 40 TABLET, FILM COATED ORAL at 08:24

## 2024-01-10 RX ADMIN — SODIUM CHLORIDE: 4.5 INJECTION, SOLUTION INTRAVENOUS at 11:21

## 2024-01-10 RX ADMIN — ASPIRIN 81 MG: 81 TABLET, CHEWABLE ORAL at 08:24

## 2024-01-10 RX ADMIN — GABAPENTIN 100 MG: 100 CAPSULE ORAL at 13:59

## 2024-01-10 RX ADMIN — METOPROLOL SUCCINATE 50 MG: 50 TABLET, EXTENDED RELEASE ORAL at 08:24

## 2024-01-10 RX ADMIN — CYANOCOBALAMIN TAB 1000 MCG 1000 MCG: 1000 TAB at 08:24

## 2024-01-10 RX ADMIN — INSULIN LISPRO 0 UNITS: 100 INJECTION, SOLUTION INTRAVENOUS; SUBCUTANEOUS at 20:05

## 2024-01-10 RX ADMIN — FERROUS SULFATE TAB 325 MG (65 MG ELEMENTAL FE) 325 MG: 325 (65 FE) TAB at 08:24

## 2024-01-10 RX ADMIN — LEVETIRACETAM 500 MG: 250 TABLET, FILM COATED ORAL at 20:04

## 2024-01-10 RX ADMIN — NYSTATIN 500000 UNITS: 100000 SUSPENSION ORAL at 08:23

## 2024-01-10 RX ADMIN — GABAPENTIN 100 MG: 100 CAPSULE ORAL at 20:04

## 2024-01-10 RX ADMIN — GABAPENTIN 100 MG: 100 CAPSULE ORAL at 08:30

## 2024-01-10 RX ADMIN — NYSTATIN 500000 UNITS: 100000 SUSPENSION ORAL at 13:59

## 2024-01-10 RX ADMIN — SODIUM CHLORIDE, PRESERVATIVE FREE 10 ML: 5 INJECTION INTRAVENOUS at 08:25

## 2024-01-10 RX ADMIN — LEVETIRACETAM 500 MG: 250 TABLET, FILM COATED ORAL at 08:30

## 2024-01-10 RX ADMIN — INSULIN GLARGINE 16 UNITS: 100 INJECTION, SOLUTION SUBCUTANEOUS at 20:04

## 2024-01-10 RX ADMIN — Medication 400 MG: at 08:24

## 2024-01-10 RX ADMIN — NYSTATIN 500000 UNITS: 100000 SUSPENSION ORAL at 20:04

## 2024-01-10 RX ADMIN — NYSTATIN 500000 UNITS: 100000 SUSPENSION ORAL at 18:19

## 2024-01-10 NOTE — PROGRESS NOTES
Gastroenterology Progress Note        Patient: Maureen Chahal MRN: [id number]  SSN: [id number]    YOB: 1945  Age: 78 y.o.  Sex: female      Admit Date: 1/7/2024    LOS: 2 days     Subjective:   Patient is examined, no nausea vomiting, no hematemesis  Past Medical History:   Diagnosis Date    Diabetes (HCC)     Dysphagia     Hypercholesterolemia     Hypertension     Osteoporosis     Stroke (HCC)         Current Facility-Administered Medications:     0.45 % sodium chloride infusion, , IntraVENous, Continuous, Dawit Cummings PA-C, Last Rate: 100 mL/hr at 01/10/24 1121, New Bag at 01/10/24 1121    amLODIPine (NORVASC) tablet 5 mg, 5 mg, Oral, Daily, Ian Medley MD, 5 mg at 01/10/24 0823    atorvastatin (LIPITOR) tablet 40 mg, 40 mg, Oral, Daily, Ian Medley MD, 40 mg at 01/10/24 0824    vitamin B-12 (CYANOCOBALAMIN) tablet 1,000 mcg, 1,000 mcg, Oral, Daily, Ian Medley MD, 1,000 mcg at 01/10/24 0824    fenofibrate (TRICOR) tablet 54 mg, 54 mg, Oral, Daily, Ian Medley MD, 54 mg at 01/10/24 0824    gabapentin (NEURONTIN) capsule 100 mg, 100 mg, Oral, TID, Ian Medley MD, 100 mg at 01/10/24 1359    insulin glargine (LANTUS) injection vial 16 Units, 16 Units, SubCUTAneous, Nightly, Ian Medley MD, 16 Units at 01/09/24 2029    levETIRAcetam (KEPPRA) tablet 500 mg, 500 mg, Oral, BID, Ian Medley MD, 500 mg at 01/10/24 0830    magnesium oxide (MAG-OX) tablet 400 mg, 400 mg, Oral, Daily, Ian Medley MD, 400 mg at 01/10/24 0824    insulin lispro (HUMALOG) injection vial 0-4 Units, 0-4 Units, SubCUTAneous, TID WC, Ian Medley MD, 2 Units at 01/08/24 1248    insulin lispro (HUMALOG) injection vial 0-4 Units, 0-4 Units, SubCUTAneous, Nightly, Ian Medley MD, 0 Units at 01/09/24 2031    metoprolol succinate (TOPROL XL) extended release tablet 50 mg, 50 mg, Oral, Daily, Ian Medley MD, 50 mg at 01/10/24 0824    sodium chloride flush 0.9 % injection 5-40 mL, 5-40 mL,

## 2024-01-10 NOTE — CARE COORDINATION
CM noted discharge order.  MERRY spoke with [facility].  They can take patient back today.    CM attempted to call patient's son, Phill.  CM had to leave message.    CM spoke with patient's sister.  She stated that Phill lives in [facility], and usually arranges medical transport for patient.  Sister unsure as to whether he pays out of pocket for it or not.      CM will wait to hear back from patient's son before discharge.

## 2024-01-10 NOTE — PROGRESS NOTES
4 Eyes Skin Assessment     NAME:  Maureen Chahal  YOB: 1945  MEDICAL RECORD NUMBER:  [id number]    The patient is being assessed for  Other Weekly Assessment    I agree that at least one RN has performed a thorough Head to Toe Skin Assessment on the patient. ALL assessment sites listed below have been assessed.      Areas assessed by both nurses:    Head, Face, Ears, Shoulders, Back, Chest, Arms, Elbows, Hands, Sacrum. Buttock, Coccyx, Ischium, Legs. Feet and Heels, and Under Medical Devices         Does the Patient have a Wound? No noted wound(s)       Hiram Prevention initiated by RN: Yes  Wound Care Orders initiated by RN: No    Pressure Injury (Stage 3,4, Unstageable, DTI, NWPT, and Complex wounds) if present, place Wound referral order by RN under : No    New Ostomies, if present place, Ostomy referral order under : No     Nurse 1 eSignature: Electronically signed by Rosangela Blas RN on 1/10/24 at 1:24 AM EST    **SHARE this note so that the co-signing nurse can place an eSignature**    Nurse 2 eSignature: Electronically signed by Shell Degroot RN on 1/10/24 at 1:52 AM EST

## 2024-01-10 NOTE — DISCHARGE SUMMARY
Discharge Summary    Name: Maureen Chahal  [id number]  YOB: 1945 (Age: 78 y.o.)   Date of Admission: 1/7/2024  Date of Discharge: 1/10/2024  Attending Physician: Carlito Diaz*    Discharge Diagnosis:   Principal Problem (Resolved):    Generalized weakness  Active Problems:    * No active hospital problems. *       Consultations:  IP CONSULT TO GI    Brief Hospital Course by Main Problems:   Maureen Chahal is a 78 y.o. female with PMH of diabetes, hypertension and other medical problems as below. Presented to the ED from SNF with chief complaint of dark stool. She reports having nausea and vomiting yesterday.  Otherwise denies UTI symptoms, suprapubic pain, flank pain, fever or chills. Per nursing staff at [facility], patient had episodes of dark green emesis, and has been more lethargic than baseline. In the ED, noted tachycardic with soft blood pressure.  No leukocytosis, however hemoglobin dropped from 10 to 9 in 4 hours. Also elevated BUN and low CO2. LA within normal limits.  Chest X-ray no acute finding. Urinalysis pending. IV protonix and IVF initiated. GI evaluated patient and recommended continue PPI, monitor hemoglobin, iron replacement, EGD.  EGD performed 1/9 revealing normal esophagus, normal duodenum, erosive gastropathy with stigmata of recent bleeding.  Recommendations included PPI, no NSAIDs for 7 days in accordance with guideline therapy. Discussed via telephone with SOUMYA Pollock, son, regarding need for temporary cessation of ASA and Plavix, discussed risks versus benefits, as patient is high risk for cardiovascular during this temporary medication pause.  Patient's family expresses understanding and agrees.  Patient with mild diarrhea, suspected due to initiation of iron therapy.  Iron discontinued at this time.  Patient's hemoglobin remained stable, elevated to 10.6.  Patient will follow-up with PCP in 1 week to get lab work rechecked  [facility] [id number]    Follow up in 1 week(s)  Follow-up appointment for recent hospital admission.  Patient should have lab work rechecked including CBC and BMP, specifically watching for hemoglobin drop.  Patient will also need aspirin and Plavix restarted in 7 days following discharge per guidelines    [facility] EMERGENCY DEP  [address] [facility] [address] [phone]  Follow up  As needed, If symptoms worsen    Arias Patten MD  [address] [place] [id number] [phone]    Follow up in 1 month(s)  Follow-up for recent hospital admission.  Continued outpatient care of erosive gastropathy          Total time in minutes spent coordinating this discharge (includes going over instructions, follow-up, prescriptions, and preparing report for sign off to her PCP) :  35 minutes

## 2024-01-11 VITALS
BODY MASS INDEX: 26.98 KG/M2 | OXYGEN SATURATION: 100 % | TEMPERATURE: 98.2 F | RESPIRATION RATE: 14 BRPM | HEART RATE: 77 BPM | SYSTOLIC BLOOD PRESSURE: 134 MMHG | DIASTOLIC BLOOD PRESSURE: 63 MMHG | WEIGHT: 158 LBS | HEIGHT: 64 IN

## 2024-01-11 LAB
GLUCOSE BLD STRIP.AUTO-MCNC: 104 MG/DL (ref 65–100)
PERFORMED BY:: ABNORMAL

## 2024-01-11 PROCEDURE — 6370000000 HC RX 637 (ALT 250 FOR IP): Performed by: INTERNAL MEDICINE

## 2024-01-11 PROCEDURE — 82962 GLUCOSE BLOOD TEST: CPT

## 2024-01-11 RX ADMIN — Medication 400 MG: at 08:15

## 2024-01-11 RX ADMIN — METOPROLOL SUCCINATE 50 MG: 50 TABLET, EXTENDED RELEASE ORAL at 08:15

## 2024-01-11 RX ADMIN — ATORVASTATIN CALCIUM 40 MG: 40 TABLET, FILM COATED ORAL at 08:15

## 2024-01-11 RX ADMIN — GABAPENTIN 100 MG: 100 CAPSULE ORAL at 08:15

## 2024-01-11 RX ADMIN — CYANOCOBALAMIN TAB 1000 MCG 1000 MCG: 1000 TAB at 08:15

## 2024-01-11 RX ADMIN — FENOFIBRATE 54 MG: 54 TABLET ORAL at 08:15

## 2024-01-11 RX ADMIN — LEVETIRACETAM 500 MG: 250 TABLET, FILM COATED ORAL at 08:15

## 2024-01-11 RX ADMIN — AMLODIPINE BESYLATE 5 MG: 5 TABLET ORAL at 08:15

## 2024-01-11 RX ADMIN — NYSTATIN 500000 UNITS: 100000 SUSPENSION ORAL at 08:15

## 2024-01-11 NOTE — PLAN OF CARE
Problem: Discharge Planning  Goal: Discharge to home or other facility with appropriate resources  Outcome: Progressing     Problem: Skin/Tissue Integrity  Goal: Absence of new skin breakdown  Description: 1.  Monitor for areas of redness and/or skin breakdown  2.  Assess vascular access sites hourly  3.  Every 4-6 hours minimum:  Change oxygen saturation probe site  4.  Every 4-6 hours:  If on nasal continuous positive airway pressure, respiratory therapy assess nares and determine need for appliance change or resting period.  Outcome: Progressing     Problem: Safety - Adult  Goal: Free from fall injury  Outcome: Progressing     Problem: Confusion  Goal: Confusion, delirium, dementia, or psychosis is improved or at baseline  Description: INTERVENTIONS:  1. Assess for possible contributors to thought disturbance, including medications, impaired vision or hearing, underlying metabolic abnormalities, dehydration, psychiatric diagnoses, and notify attending LIP  2. [place] high risk fall precautions, as indicated  3. Provide frequent short contacts to provide reality reorientation, refocusing and direction  4. Decrease environmental stimuli, including noise as appropriate  5. Monitor and intervene to maintain adequate nutrition, hydration, elimination, sleep and activity  6. If unable to ensure safety without constant attention obtain sitter and review sitter guidelines with assigned personnel  7. Initiate Psychosocial CNS and Spiritual Care consult, as indicated  Outcome: Progressing     Problem: Metabolic/Fluid and Electrolytes - Adult  Goal: Electrolytes maintained within normal limits  Outcome: Progressing     Problem: Hematologic - Adult  Goal: Maintains hematologic stability  Outcome: Progressing

## 2024-01-11 NOTE — CARE COORDINATION
CM noted discharge order.  CM spoke with patient's son.  He is aware and agreeable to discharge plan.    Patient transport set up through [facility] for 12:45pm.    RN can call report to [phone] and ask for nurses station for patient.    Primary RN made aware.    Transition of Care Plan:    RUR: 13%  Prior Level of Functioning: Needs assistance  Disposition: RODO  If SNF or IPR: Date FOC offered: N/A  Date FOC received: N/A  Accepting facility: N/A  Date authorization started with reference number: N/A  Date authorization received and expires: N/A  Follow up appointments: Per MD  DME needed: N/A  Transportation at discharge: [place]  [facility] Medicare/ letter given: Yes  Is patient a  and connected with [facility]? N/A   If yes, was [place] transfer form completed and [facility] notified?   Caregiver Contact: Phill Jimenez, Son  Discharge Caregiver contacted prior to discharge? Yes  Care Conference needed? No  Barriers to discharge: None

## 2024-01-11 NOTE — PROGRESS NOTES
1050 Report called to Maureen at [facility].  Patient transport set for 1245.  Will send discharge paperwork with patient to facility.

## 2024-01-11 NOTE — DISCHARGE SUMMARY
Discharge Summary    Name: Maureen Chahal  [id number]  YOB: 1945 (Age: 78 y.o.)   Date of Admission: 1/7/2024  Date of Discharge: 1/11/2024  Attending Physician: Carlito Diaz*    Discharge Diagnosis:   Principal Problem (Resolved):    Generalized weakness  Active Problems:    * No active hospital problems. *       Consultations:  IP CONSULT TO GI    Brief Hospital Course by Main Problems:   Maureen Chahal is a 78 y.o. female with PMH of diabetes, hypertension and other medical problems as below. Presented to the ED from SNF with chief complaint of dark stool. She reports having nausea and vomiting yesterday.  Otherwise denies UTI symptoms, suprapubic pain, flank pain, fever or chills. Per nursing staff at [facility], patient had episodes of dark green emesis, and has been more lethargic than baseline. In the ED, noted tachycardic with soft blood pressure.  No leukocytosis, however hemoglobin dropped from 10 to 9 in 4 hours. Also elevated BUN and low CO2. LA within normal limits.  Chest X-ray no acute finding. Urinalysis pending. IV protonix and IVF initiated. GI evaluated patient and recommended continue PPI, monitor hemoglobin, iron replacement, EGD.  EGD performed 1/9 revealing normal esophagus, normal duodenum, erosive gastropathy with stigmata of recent bleeding.  Recommendations included PPI, no NSAIDs for 7 days in accordance with guideline therapy. Discussed via telephone with SOUMYA Pollock, son, regarding need for temporary cessation of ASA and Plavix, discussed risks versus benefits, as patient is high risk for cardiovascular during this temporary medication pause.  Patient's family expresses understanding and agrees.  Patient with mild diarrhea, suspected due to initiation of iron therapy.  Iron discontinued at this time.  Patient's hemoglobin remained stable, elevated to 10.6.  Patient will follow-up with PCP in 1 week to get lab work rechecked  How Severe Are Your Warts?: moderate Is This A New Presentation, Or A Follow-Up?: Wart in order to evaluate hemoglobin and to have aspirin and Plavix restarted after 7 days.  At this time patient is medically stable for discharge, son Phill understands and agrees with the plan.  Patient will return to [facility]. Discharge delayed due to lack of transport, will arrange today for discharge. No acute changes overnight, patient is resting comfortably without complaint.    Discharge Exam:  Patient seen and examined by me on discharge day.  Pertinent Findings:  Patient Vitals for the past 24 hrs:   BP Temp Temp src Pulse Resp SpO2   01/11/24 0438 129/65 97.5 °F (36.4 °C) Oral 73 18 99 %   01/10/24 1936 (!) 136/55 97.5 °F (36.4 °C) Axillary 83 18 99 %   01/10/24 1558 (!) 143/56 99.5 °F (37.5 °C) Oral 84 18 --   01/10/24 1552 (!) 127/51 98.2 °F (36.8 °C) Oral 78 18 100 %       Physical Exam  Constitutional:       General: She is not in acute distress.     Appearance: She is not ill-appearing.   Cardiovascular:      Rate and Rhythm: Normal rate and regular rhythm.      Pulses: Normal pulses.   Pulmonary:      Effort: Pulmonary effort is normal. No respiratory distress.      Breath sounds: Normal breath sounds.   Abdominal:      General: Abdomen is flat. There is no distension.      Palpations: Abdomen is soft.      Tenderness: There is no abdominal tenderness.   Musculoskeletal:         General: No swelling or tenderness. Normal range of motion.   Skin:     General: Skin is warm and dry.   Neurological:      Mental Status: She is alert. Mental status is at baseline. She is disoriented.   Psychiatric:         Mood and Affect: Mood normal.         Behavior: Behavior normal.         Discharge/Recent Laboratory Results:  Recent Labs     01/10/24  0552      K 3.7   *   CO2 19*   BUN 42*   CREATININE 1.02   GLUCOSE 135*   CALCIUM 9.0     Recent Labs     01/10/24  0952   HGB 10.6*   HCT 34.1*   WBC 3.5*          Discharge Medications:     Medication List        START taking these medications